# Patient Record
Sex: FEMALE | Race: BLACK OR AFRICAN AMERICAN | Employment: OTHER | URBAN - METROPOLITAN AREA
[De-identification: names, ages, dates, MRNs, and addresses within clinical notes are randomized per-mention and may not be internally consistent; named-entity substitution may affect disease eponyms.]

---

## 2017-01-26 ENCOUNTER — OP HISTORICAL/CONVERTED ENCOUNTER (OUTPATIENT)
Dept: OTHER | Age: 67
End: 2017-01-26

## 2017-01-27 ENCOUNTER — OP HISTORICAL/CONVERTED ENCOUNTER (OUTPATIENT)
Dept: OTHER | Age: 67
End: 2017-01-27

## 2017-01-30 ENCOUNTER — OP HISTORICAL/CONVERTED ENCOUNTER (OUTPATIENT)
Dept: OTHER | Age: 67
End: 2017-01-30

## 2017-02-01 ENCOUNTER — OP HISTORICAL/CONVERTED ENCOUNTER (OUTPATIENT)
Dept: OTHER | Age: 67
End: 2017-02-01

## 2017-02-03 ENCOUNTER — OP HISTORICAL/CONVERTED ENCOUNTER (OUTPATIENT)
Dept: OTHER | Age: 67
End: 2017-02-03

## 2017-02-06 ENCOUNTER — OP HISTORICAL/CONVERTED ENCOUNTER (OUTPATIENT)
Dept: OTHER | Age: 67
End: 2017-02-06

## 2017-02-08 ENCOUNTER — OP HISTORICAL/CONVERTED ENCOUNTER (OUTPATIENT)
Dept: OTHER | Age: 67
End: 2017-02-08

## 2017-02-13 ENCOUNTER — IP HISTORICAL/CONVERTED ENCOUNTER (OUTPATIENT)
Dept: OTHER | Age: 67
End: 2017-02-13

## 2017-02-13 ENCOUNTER — OP HISTORICAL/CONVERTED ENCOUNTER (OUTPATIENT)
Dept: OTHER | Age: 67
End: 2017-02-13

## 2017-02-19 ENCOUNTER — OP HISTORICAL/CONVERTED ENCOUNTER (OUTPATIENT)
Dept: OTHER | Age: 67
End: 2017-02-19

## 2017-03-16 ENCOUNTER — OP HISTORICAL/CONVERTED ENCOUNTER (OUTPATIENT)
Dept: OTHER | Age: 67
End: 2017-03-16

## 2017-03-17 ENCOUNTER — OP HISTORICAL/CONVERTED ENCOUNTER (OUTPATIENT)
Dept: OTHER | Age: 67
End: 2017-03-17

## 2017-03-18 ENCOUNTER — OP HISTORICAL/CONVERTED ENCOUNTER (OUTPATIENT)
Dept: OTHER | Age: 67
End: 2017-03-18

## 2017-03-19 ENCOUNTER — OP HISTORICAL/CONVERTED ENCOUNTER (OUTPATIENT)
Dept: OTHER | Age: 67
End: 2017-03-19

## 2017-03-20 ENCOUNTER — OP HISTORICAL/CONVERTED ENCOUNTER (OUTPATIENT)
Dept: OTHER | Age: 67
End: 2017-03-20

## 2017-03-21 ENCOUNTER — OP HISTORICAL/CONVERTED ENCOUNTER (OUTPATIENT)
Dept: OTHER | Age: 67
End: 2017-03-21

## 2017-03-22 ENCOUNTER — OP HISTORICAL/CONVERTED ENCOUNTER (OUTPATIENT)
Dept: OTHER | Age: 67
End: 2017-03-22

## 2017-03-27 ENCOUNTER — OP HISTORICAL/CONVERTED ENCOUNTER (OUTPATIENT)
Dept: OTHER | Age: 67
End: 2017-03-27

## 2017-03-30 ENCOUNTER — OP HISTORICAL/CONVERTED ENCOUNTER (OUTPATIENT)
Dept: OTHER | Age: 67
End: 2017-03-30

## 2017-04-11 ENCOUNTER — OP HISTORICAL/CONVERTED ENCOUNTER (OUTPATIENT)
Dept: OTHER | Age: 67
End: 2017-04-11

## 2017-04-13 ENCOUNTER — OP HISTORICAL/CONVERTED ENCOUNTER (OUTPATIENT)
Dept: OTHER | Age: 67
End: 2017-04-13

## 2017-04-15 ENCOUNTER — OP HISTORICAL/CONVERTED ENCOUNTER (OUTPATIENT)
Dept: OTHER | Age: 67
End: 2017-04-15

## 2017-04-17 ENCOUNTER — OP HISTORICAL/CONVERTED ENCOUNTER (OUTPATIENT)
Dept: OTHER | Age: 67
End: 2017-04-17

## 2017-04-21 ENCOUNTER — OP HISTORICAL/CONVERTED ENCOUNTER (OUTPATIENT)
Dept: OTHER | Age: 67
End: 2017-04-21

## 2017-04-25 ENCOUNTER — OP HISTORICAL/CONVERTED ENCOUNTER (OUTPATIENT)
Dept: OTHER | Age: 67
End: 2017-04-25

## 2017-04-28 ENCOUNTER — OP HISTORICAL/CONVERTED ENCOUNTER (OUTPATIENT)
Dept: OTHER | Age: 67
End: 2017-04-28

## 2017-05-12 ENCOUNTER — OP HISTORICAL/CONVERTED ENCOUNTER (OUTPATIENT)
Dept: OTHER | Age: 67
End: 2017-05-12

## 2017-05-15 ENCOUNTER — OP HISTORICAL/CONVERTED ENCOUNTER (OUTPATIENT)
Dept: OTHER | Age: 67
End: 2017-05-15

## 2017-05-16 ENCOUNTER — OP HISTORICAL/CONVERTED ENCOUNTER (OUTPATIENT)
Dept: OTHER | Age: 67
End: 2017-05-16

## 2017-05-18 ENCOUNTER — OP HISTORICAL/CONVERTED ENCOUNTER (OUTPATIENT)
Dept: OTHER | Age: 67
End: 2017-05-18

## 2017-06-01 ENCOUNTER — OP HISTORICAL/CONVERTED ENCOUNTER (OUTPATIENT)
Dept: OTHER | Age: 67
End: 2017-06-01

## 2017-06-02 ENCOUNTER — OP HISTORICAL/CONVERTED ENCOUNTER (OUTPATIENT)
Dept: OTHER | Age: 67
End: 2017-06-02

## 2017-06-06 ENCOUNTER — OP HISTORICAL/CONVERTED ENCOUNTER (OUTPATIENT)
Dept: OTHER | Age: 67
End: 2017-06-06

## 2017-06-07 ENCOUNTER — OP HISTORICAL/CONVERTED ENCOUNTER (OUTPATIENT)
Dept: OTHER | Age: 67
End: 2017-06-07

## 2017-06-13 ENCOUNTER — OP HISTORICAL/CONVERTED ENCOUNTER (OUTPATIENT)
Dept: OTHER | Age: 67
End: 2017-06-13

## 2017-06-15 ENCOUNTER — OP HISTORICAL/CONVERTED ENCOUNTER (OUTPATIENT)
Dept: OTHER | Age: 67
End: 2017-06-15

## 2017-06-16 ENCOUNTER — OP HISTORICAL/CONVERTED ENCOUNTER (OUTPATIENT)
Dept: OTHER | Age: 67
End: 2017-06-16

## 2017-06-19 ENCOUNTER — OP HISTORICAL/CONVERTED ENCOUNTER (OUTPATIENT)
Dept: OTHER | Age: 67
End: 2017-06-19

## 2017-06-21 ENCOUNTER — OP HISTORICAL/CONVERTED ENCOUNTER (OUTPATIENT)
Dept: OTHER | Age: 67
End: 2017-06-21

## 2017-06-28 ENCOUNTER — OP HISTORICAL/CONVERTED ENCOUNTER (OUTPATIENT)
Dept: OTHER | Age: 67
End: 2017-06-28

## 2017-07-12 ENCOUNTER — OP HISTORICAL/CONVERTED ENCOUNTER (OUTPATIENT)
Dept: OTHER | Age: 67
End: 2017-07-12

## 2017-07-14 ENCOUNTER — OP HISTORICAL/CONVERTED ENCOUNTER (OUTPATIENT)
Dept: OTHER | Age: 67
End: 2017-07-14

## 2017-07-17 ENCOUNTER — OP HISTORICAL/CONVERTED ENCOUNTER (OUTPATIENT)
Dept: OTHER | Age: 67
End: 2017-07-17

## 2017-08-03 ENCOUNTER — IP HISTORICAL/CONVERTED ENCOUNTER (OUTPATIENT)
Dept: OTHER | Age: 67
End: 2017-08-03

## 2017-08-03 ENCOUNTER — OP HISTORICAL/CONVERTED ENCOUNTER (OUTPATIENT)
Dept: OTHER | Age: 67
End: 2017-08-03

## 2017-08-15 ENCOUNTER — IP HISTORICAL/CONVERTED ENCOUNTER (OUTPATIENT)
Dept: OTHER | Age: 67
End: 2017-08-15

## 2017-08-23 ENCOUNTER — OP HISTORICAL/CONVERTED ENCOUNTER (OUTPATIENT)
Dept: OTHER | Age: 67
End: 2017-08-23

## 2017-09-08 ENCOUNTER — OP HISTORICAL/CONVERTED ENCOUNTER (OUTPATIENT)
Dept: OTHER | Age: 67
End: 2017-09-08

## 2017-09-15 ENCOUNTER — OP HISTORICAL/CONVERTED ENCOUNTER (OUTPATIENT)
Dept: OTHER | Age: 67
End: 2017-09-15

## 2017-09-22 ENCOUNTER — OP HISTORICAL/CONVERTED ENCOUNTER (OUTPATIENT)
Dept: OTHER | Age: 67
End: 2017-09-22

## 2017-09-25 ENCOUNTER — OP HISTORICAL/CONVERTED ENCOUNTER (OUTPATIENT)
Dept: OTHER | Age: 67
End: 2017-09-25

## 2017-10-08 ENCOUNTER — IP HISTORICAL/CONVERTED ENCOUNTER (OUTPATIENT)
Dept: OTHER | Age: 67
End: 2017-10-08

## 2017-10-18 ENCOUNTER — OP HISTORICAL/CONVERTED ENCOUNTER (OUTPATIENT)
Dept: OTHER | Age: 67
End: 2017-10-18

## 2017-10-30 ENCOUNTER — OP HISTORICAL/CONVERTED ENCOUNTER (OUTPATIENT)
Dept: OTHER | Age: 67
End: 2017-10-30

## 2017-11-30 ENCOUNTER — OP HISTORICAL/CONVERTED ENCOUNTER (OUTPATIENT)
Dept: OTHER | Age: 67
End: 2017-11-30

## 2017-12-01 ENCOUNTER — OP HISTORICAL/CONVERTED ENCOUNTER (OUTPATIENT)
Dept: OTHER | Age: 67
End: 2017-12-01

## 2017-12-04 ENCOUNTER — OP HISTORICAL/CONVERTED ENCOUNTER (OUTPATIENT)
Dept: OTHER | Age: 67
End: 2017-12-04

## 2017-12-06 ENCOUNTER — OP HISTORICAL/CONVERTED ENCOUNTER (OUTPATIENT)
Dept: OTHER | Age: 67
End: 2017-12-06

## 2017-12-08 ENCOUNTER — OP HISTORICAL/CONVERTED ENCOUNTER (OUTPATIENT)
Dept: OTHER | Age: 67
End: 2017-12-08

## 2017-12-12 ENCOUNTER — OP HISTORICAL/CONVERTED ENCOUNTER (OUTPATIENT)
Dept: OTHER | Age: 67
End: 2017-12-12

## 2017-12-22 ENCOUNTER — IP HISTORICAL/CONVERTED ENCOUNTER (OUTPATIENT)
Dept: OTHER | Age: 67
End: 2017-12-22

## 2017-12-29 ENCOUNTER — OP HISTORICAL/CONVERTED ENCOUNTER (OUTPATIENT)
Dept: OTHER | Age: 67
End: 2017-12-29

## 2017-12-31 ENCOUNTER — OP HISTORICAL/CONVERTED ENCOUNTER (OUTPATIENT)
Dept: OTHER | Age: 67
End: 2017-12-31

## 2018-01-03 ENCOUNTER — OP HISTORICAL/CONVERTED ENCOUNTER (OUTPATIENT)
Dept: OTHER | Age: 68
End: 2018-01-03

## 2018-01-04 ENCOUNTER — OP HISTORICAL/CONVERTED ENCOUNTER (OUTPATIENT)
Dept: OTHER | Age: 68
End: 2018-01-04

## 2018-01-05 ENCOUNTER — OP HISTORICAL/CONVERTED ENCOUNTER (OUTPATIENT)
Dept: OTHER | Age: 68
End: 2018-01-05

## 2018-01-07 ENCOUNTER — OP HISTORICAL/CONVERTED ENCOUNTER (OUTPATIENT)
Dept: OTHER | Age: 68
End: 2018-01-07

## 2018-01-09 ENCOUNTER — OP HISTORICAL/CONVERTED ENCOUNTER (OUTPATIENT)
Dept: OTHER | Age: 68
End: 2018-01-09

## 2018-01-10 ENCOUNTER — OP HISTORICAL/CONVERTED ENCOUNTER (OUTPATIENT)
Dept: OTHER | Age: 68
End: 2018-01-10

## 2018-01-12 ENCOUNTER — OP HISTORICAL/CONVERTED ENCOUNTER (OUTPATIENT)
Dept: OTHER | Age: 68
End: 2018-01-12

## 2018-01-13 ENCOUNTER — OP HISTORICAL/CONVERTED ENCOUNTER (OUTPATIENT)
Dept: OTHER | Age: 68
End: 2018-01-13

## 2018-01-23 ENCOUNTER — ED HISTORICAL/CONVERTED ENCOUNTER (OUTPATIENT)
Dept: OTHER | Age: 68
End: 2018-01-23

## 2018-05-16 ENCOUNTER — OP HISTORICAL/CONVERTED ENCOUNTER (OUTPATIENT)
Dept: OTHER | Age: 68
End: 2018-05-16

## 2018-06-27 ENCOUNTER — ED HISTORICAL/CONVERTED ENCOUNTER (OUTPATIENT)
Dept: OTHER | Age: 68
End: 2018-06-27

## 2019-02-15 ENCOUNTER — ED HISTORICAL/CONVERTED ENCOUNTER (OUTPATIENT)
Dept: OTHER | Age: 69
End: 2019-02-15

## 2019-03-27 ENCOUNTER — OP HISTORICAL/CONVERTED ENCOUNTER (OUTPATIENT)
Dept: OTHER | Age: 69
End: 2019-03-27

## 2019-04-08 ENCOUNTER — ED HISTORICAL/CONVERTED ENCOUNTER (OUTPATIENT)
Dept: OTHER | Age: 69
End: 2019-04-08

## 2019-04-15 ENCOUNTER — IP HISTORICAL/CONVERTED ENCOUNTER (OUTPATIENT)
Dept: OTHER | Age: 69
End: 2019-04-15

## 2019-06-21 ENCOUNTER — OP HISTORICAL/CONVERTED ENCOUNTER (OUTPATIENT)
Dept: OTHER | Age: 69
End: 2019-06-21

## 2019-07-25 ENCOUNTER — OP HISTORICAL/CONVERTED ENCOUNTER (OUTPATIENT)
Dept: OTHER | Age: 69
End: 2019-07-25

## 2020-04-24 ENCOUNTER — IP HISTORICAL/CONVERTED ENCOUNTER (OUTPATIENT)
Dept: OTHER | Age: 70
End: 2020-04-24

## 2020-10-17 ENCOUNTER — HOSPITAL ENCOUNTER (INPATIENT)
Age: 70
LOS: 11 days | Discharge: NURSING, REHAB OR LONG-TERM CARE | DRG: 640 | End: 2020-10-28
Attending: EMERGENCY MEDICINE | Admitting: INTERNAL MEDICINE
Payer: MEDICARE

## 2020-10-17 DIAGNOSIS — E87.0 HYPERNATREMIA: Primary | ICD-10-CM

## 2020-10-17 DIAGNOSIS — N39.0 URINARY TRACT INFECTION WITHOUT HEMATURIA, SITE UNSPECIFIED: ICD-10-CM

## 2020-10-17 LAB
ALBUMIN SERPL-MCNC: 2.9 G/DL (ref 3.5–5)
ALBUMIN/GLOB SERPL: 0.7 {RATIO} (ref 1.1–2.2)
ALP SERPL-CCNC: 140 U/L (ref 45–117)
ALT SERPL-CCNC: 16 U/L (ref 12–78)
ANION GAP SERPL CALC-SCNC: 1 MMOL/L (ref 5–15)
ANION GAP SERPL CALC-SCNC: 3 MMOL/L (ref 5–15)
APPEARANCE UR: CLEAR
AST SERPL W P-5'-P-CCNC: ABNORMAL U/L (ref 15–37)
BACTERIA URNS QL MICRO: NEGATIVE /HPF
BASOPHILS # BLD: 0 K/UL (ref 0–0.1)
BASOPHILS NFR BLD: 0 % (ref 0–1)
BILIRUB SERPL-MCNC: 0.4 MG/DL (ref 0.2–1)
BILIRUB UR QL: NEGATIVE
BUN SERPL-MCNC: 26 MG/DL (ref 6–20)
BUN SERPL-MCNC: 38 MG/DL (ref 6–20)
BUN/CREAT SERPL: 17 (ref 12–20)
BUN/CREAT SERPL: 20 (ref 12–20)
CA-I BLD-MCNC: 8.2 MG/DL (ref 8.5–10.1)
CA-I BLD-MCNC: 8.3 MG/DL (ref 8.5–10.1)
CHLORIDE SERPL-SCNC: 128 MMOL/L (ref 97–108)
CHLORIDE SERPL-SCNC: 136 MMOL/L (ref 97–108)
CO2 SERPL-SCNC: 26 MMOL/L (ref 21–32)
CO2 SERPL-SCNC: 29 MMOL/L (ref 21–32)
COLOR UR: ABNORMAL
CREAT SERPL-MCNC: 1.52 MG/DL (ref 0.55–1.02)
CREAT SERPL-MCNC: 1.89 MG/DL (ref 0.55–1.02)
DIFFERENTIAL METHOD BLD: ABNORMAL
EOSINOPHIL # BLD: 0.2 K/UL (ref 0–0.4)
EOSINOPHIL NFR BLD: 3 % (ref 0–7)
ERYTHROCYTE [DISTWIDTH] IN BLOOD BY AUTOMATED COUNT: 25.6 % (ref 11.5–14.5)
GLOBULIN SER CALC-MCNC: 4.3 G/DL (ref 2–4)
GLUCOSE SERPL-MCNC: 112 MG/DL (ref 65–100)
GLUCOSE SERPL-MCNC: 70 MG/DL (ref 65–100)
GLUCOSE UR STRIP.AUTO-MCNC: NEGATIVE MG/DL
HCT VFR BLD AUTO: 47.6 % (ref 35–47)
HGB BLD-MCNC: 13.8 G/DL (ref 11.5–16)
HGB UR QL STRIP: ABNORMAL
HYALINE CASTS URNS QL MICRO: ABNORMAL /LPF (ref 0–5)
IMM GRANULOCYTES # BLD AUTO: 0 K/UL (ref 0–0.04)
IMM GRANULOCYTES NFR BLD AUTO: 0 % (ref 0–0.5)
KETONES UR QL STRIP.AUTO: NEGATIVE MG/DL
LEUKOCYTE ESTERASE UR QL STRIP.AUTO: NEGATIVE
LYMPHOCYTES # BLD: 2.7 K/UL (ref 0.8–3.5)
LYMPHOCYTES NFR BLD: 47 % (ref 12–49)
MCH RBC QN AUTO: 27.9 PG (ref 26–34)
MCHC RBC AUTO-ENTMCNC: 29 G/DL (ref 30–36.5)
MCV RBC AUTO: 96.2 FL (ref 80–99)
MONOCYTES # BLD: 0.4 K/UL (ref 0–1)
MONOCYTES NFR BLD: 8 % (ref 5–13)
MUCOUS THREADS URNS QL MICRO: ABNORMAL /LPF
NEUTS SEG # BLD: 2.4 K/UL (ref 1.8–8)
NEUTS SEG NFR BLD: 42 % (ref 32–75)
NITRITE UR QL STRIP.AUTO: NEGATIVE
NRBC # BLD: 0.02 K/UL (ref 0–0.01)
NRBC BLD-RTO: 0.3 PER 100 WBC
PH UR STRIP: 5 [PH] (ref 5–8)
PLATELET # BLD AUTO: 132 K/UL (ref 150–400)
POTASSIUM SERPL-SCNC: 4.1 MMOL/L (ref 3.5–5.1)
POTASSIUM SERPL-SCNC: ABNORMAL MMOL/L (ref 3.5–5.1)
PROT SERPL-MCNC: 7.2 G/DL (ref 6.4–8.2)
PROT UR STRIP-MCNC: NEGATIVE MG/DL
RBC # BLD AUTO: 4.95 M/UL (ref 3.8–5.2)
RBC #/AREA URNS HPF: ABNORMAL /HPF (ref 0–5)
SODIUM SERPL-SCNC: 160 MMOL/L (ref 136–145)
SODIUM SERPL-SCNC: 163 MMOL/L (ref 136–145)
SP GR UR REFRACTOMETRY: 1.03 (ref 1–1.03)
UA: UC IF INDICATED,UAUC: ABNORMAL
UROBILINOGEN UR QL STRIP.AUTO: 0.1 EU/DL (ref 0.1–1)
WBC # BLD AUTO: 5.7 K/UL (ref 3.6–11)
WBC URNS QL MICRO: ABNORMAL /HPF (ref 0–4)

## 2020-10-17 PROCEDURE — 65270000029 HC RM PRIVATE

## 2020-10-17 PROCEDURE — 80053 COMPREHEN METABOLIC PANEL: CPT

## 2020-10-17 PROCEDURE — 87635 SARS-COV-2 COVID-19 AMP PRB: CPT

## 2020-10-17 PROCEDURE — 74011000258 HC RX REV CODE- 258: Performed by: INTERNAL MEDICINE

## 2020-10-17 PROCEDURE — 87086 URINE CULTURE/COLONY COUNT: CPT

## 2020-10-17 PROCEDURE — 36415 COLL VENOUS BLD VENIPUNCTURE: CPT

## 2020-10-17 PROCEDURE — 74011250636 HC RX REV CODE- 250/636: Performed by: INTERNAL MEDICINE

## 2020-10-17 PROCEDURE — 74011250637 HC RX REV CODE- 250/637: Performed by: INTERNAL MEDICINE

## 2020-10-17 PROCEDURE — 80048 BASIC METABOLIC PNL TOTAL CA: CPT

## 2020-10-17 PROCEDURE — 81001 URINALYSIS AUTO W/SCOPE: CPT

## 2020-10-17 PROCEDURE — 85025 COMPLETE CBC W/AUTO DIFF WBC: CPT

## 2020-10-17 PROCEDURE — 99284 EMERGENCY DEPT VISIT MOD MDM: CPT

## 2020-10-17 PROCEDURE — 74011000258 HC RX REV CODE- 258: Performed by: EMERGENCY MEDICINE

## 2020-10-17 RX ORDER — DOCUSATE SODIUM 100 MG/1
100 CAPSULE, LIQUID FILLED ORAL 2 TIMES DAILY
Status: DISCONTINUED | OUTPATIENT
Start: 2020-10-17 | End: 2020-10-28 | Stop reason: HOSPADM

## 2020-10-17 RX ORDER — ENOXAPARIN SODIUM 100 MG/ML
30 INJECTION SUBCUTANEOUS EVERY 24 HOURS
Status: DISCONTINUED | OUTPATIENT
Start: 2020-10-17 | End: 2020-10-23

## 2020-10-17 RX ORDER — SODIUM CHLORIDE 0.9 % (FLUSH) 0.9 %
5-40 SYRINGE (ML) INJECTION EVERY 8 HOURS
Status: DISCONTINUED | OUTPATIENT
Start: 2020-10-17 | End: 2020-10-27

## 2020-10-17 RX ORDER — SODIUM CHLORIDE 450 MG/100ML
150 INJECTION, SOLUTION INTRAVENOUS CONTINUOUS
Status: DISCONTINUED | OUTPATIENT
Start: 2020-10-17 | End: 2020-10-18

## 2020-10-17 RX ORDER — ACETAMINOPHEN 325 MG/1
650 TABLET ORAL
Status: DISCONTINUED | OUTPATIENT
Start: 2020-10-17 | End: 2020-10-28 | Stop reason: HOSPADM

## 2020-10-17 RX ORDER — SODIUM CHLORIDE 0.9 % (FLUSH) 0.9 %
5-40 SYRINGE (ML) INJECTION AS NEEDED
Status: DISCONTINUED | OUTPATIENT
Start: 2020-10-17 | End: 2020-10-28 | Stop reason: HOSPADM

## 2020-10-17 RX ADMIN — SODIUM CHLORIDE 150 ML/HR: 4.5 INJECTION, SOLUTION INTRAVENOUS at 01:47

## 2020-10-17 RX ADMIN — SODIUM CHLORIDE 150 ML/HR: 4.5 INJECTION, SOLUTION INTRAVENOUS at 15:51

## 2020-10-17 RX ADMIN — Medication 10 ML: at 15:52

## 2020-10-17 RX ADMIN — CEFTRIAXONE SODIUM 1 G: 1 INJECTION, POWDER, FOR SOLUTION INTRAMUSCULAR; INTRAVENOUS at 12:21

## 2020-10-17 RX ADMIN — SODIUM CHLORIDE 150 ML/HR: 4.5 INJECTION, SOLUTION INTRAVENOUS at 08:50

## 2020-10-17 RX ADMIN — DOCUSATE SODIUM 100 MG: 100 CAPSULE, LIQUID FILLED ORAL at 22:45

## 2020-10-17 RX ADMIN — Medication 10 ML: at 22:46

## 2020-10-17 NOTE — H&P
[address]   History and Physical    Primary Care Provider: Paolo Reed MD  Date of Service:  10/17/2020    Subjective:     Joanna Molina is a 69 y.o. female who presents with altered mental status. Onset of symptoms was gradual with gradually worsening course since that time. Patient's sodium level was elevated at 173. UA shows possible UTI  Review of Systems:    Unable to obtain because of patient's altered mental status    No past medical history on file. No past surgical history on file. Prior to Admission medications    Not on File     No Known Allergies   No family history on file. SOCIAL HISTORY:  Patient resides at [facility]. Patient ambulates with assisted device. Smoking history: never  Alcohol history: None        Objective:       Physical Exam:   Visit Vitals  BP (!) 149/95 (BP 1 Location: Right arm, BP Patient Position: At rest)   Pulse 68   Temp 98 °F (36.7 °C)   Resp 19   SpO2 96%     General:   Mild confusion   Head:  Normocephalic, without obvious abnormality, atraumatic. Eyes:  Conjunctivae/corneas clear. PERRL, EOMs intact. Fundi benign. Ears:  Normal TMs and external ear canals both ears. Nose: Nares normal. Septum midline. Mucosa normal. No drainage or sinus tenderness. Throat: Lips, mucosa, and tongue normal. Teeth and gums normal.   Neck: Supple, symmetrical, trachea midline, no adenopathy, thyroid: no enlargement/tenderness/nodules, no carotid bruit and no JVD. Back:   Symmetric, no curvature. ROM normal. No CVA tenderness. Lungs:   Clear to auscultation bilaterally. Chest wall:  No tenderness or deformity. Heart:  Regular rate and rhythm, S1, S2 normal, no murmur, click, rub or gallop. Breast Exam:  No tenderness, masses, or nipple abnormality. Abdomen:   Soft, non-tender. Bowel sounds normal. No masses,  No organomegaly. Genitalia:  Normal female without lesion, discharge or tenderness.    Rectal:  Normal tone,  no masses or tenderness  Guaiac negative stool. Extremities: Extremities normal, atraumatic, no cyanosis or edema. Pulses: 2+ and symmetric all extremities. Skin: Skin color, texture, turgor normal. No rashes or lesions. Lymph nodes: Cervical, supraclavicular, and axillary nodes normal.   Neurologic: CNII-XII intact. Normal strength, sensation and reflexes throughout. Cap refill: Brisk, less than 3 seconds  Pulses: 2+, symmetric in all extremities    ECG:  normal EKG, normal sinus rhythm, unchanged from previous tracings     Data Review: All diagnostic labs and studies have been reviewed. Chest x-ray  No orders to display       Assessment:     Active Problems:    Hypernatremia (10/17/2020)      UTI (urinary tract infection) (10/17/2020)    Acute severe hypernatremia continue with hypotonic solutions consult nephrology  Acute metabolic encephalopathy secondary to electrolyte abnormality monitor BMP and mental status  Volume depletion  UTI placed on IV antibiotics  Patient Active Problem List   Diagnosis Code    Hypernatremia E87.0    UTI (urinary tract infection) N39.0       Plan:     1.   Continue with hypotonic IV fluids  FUNCTIONAL STATUS PRIOR TO HOSPITALIZATION Ambulatory with Use of Assistive Devices (including history of recent falls)    Signed By: Marlin Cárdenas MD     October 17, 2020

## 2020-10-17 NOTE — ED NOTES
Pt resting in bed in no distress, pt continues to call out and state that she needed to urinate    Pt placed on bedpan, however is not able to urinate    Purewick placed on pt without difficulty, pt tolerating well

## 2020-10-17 NOTE — ED TRIAGE NOTES
Pt arrived to ED via EMS from [facility] with CO altered mental status and irregular labs per nursing home.

## 2020-10-17 NOTE — ED PROVIDER NOTES
EMERGENCY DEPARTMENT HISTORY AND PHYSICAL EXAM      Date: 10/17/2020  Patient Name: Genesis Santos      History of Presenting Illness     Chief Complaint   Patient presents with    Altered mental status       History Provided By: EMS and Patient chart    HPI: Genesis Santos, 69 y.o. female with a past medical history significant  Hypertension, feeding difficulties, dementia, stroke, seizure disorder presents to the ED from nursing facility where she was noted to have urinary tract infection as well as hypernatremia. Patient is pleasantly confused. She has no complaints currently. Sodium at facility was 174. Creatinine was 1.9. Baseline showing signs of severe dehydration. Likely because she has not been eating. She had a negative Covid test 2 days ago. She had a Keppra level of 75 2 days ago. There are no other complaints, changes, or physical findings at this time. PCP: Bryce Merida MD    Current Facility-Administered Medications   Medication Dose Route Frequency Provider Last Rate Last Dose    0.45% sodium chloride infusion  150 mL/hr IntraVENous CONTINUOUS Hair Heath MD        acetaminophen (TYLENOL) tablet 650 mg  650 mg Oral Q6H PRN Hair Heath MD           Past History     Past Medical History:  No past medical history on file. Past Surgical History:  No past surgical history on file. Family History:  No family history on file. Social History:  Social History     Tobacco Use    Smoking status: Not on file   Substance Use Topics    Alcohol use: Not on file    Drug use: Not on file       Allergies:  No Known Allergies      Review of Systems      Review of Systems   Unable to perform ROS: Dementia       Physical Exam      Physical Exam  Vitals signs and nursing note reviewed. Constitutional:       Appearance: She is well-developed. HENT:      Head: Normocephalic and atraumatic.       Mouth/Throat:      Mouth: Mucous membranes are moist.   Eyes: Extraocular Movements: Extraocular movements intact. Pupils: Pupils are equal, round, and reactive to light. Neck:      Musculoskeletal: Normal range of motion. Cardiovascular:      Rate and Rhythm: Normal rate and regular rhythm. Heart sounds: Normal heart sounds. No murmur. Pulmonary:      Effort: Pulmonary effort is normal.      Breath sounds: Normal breath sounds. Abdominal:      General: Bowel sounds are increased. Palpations: Abdomen is soft. Tenderness: There is abdominal tenderness in the epigastric area. There is no guarding or rebound. Hernia: No hernia is present. Musculoskeletal: Normal range of motion. Skin:     General: Skin is warm and dry. Capillary Refill: Capillary refill takes less than 2 seconds. Neurological:      Mental Status: She is alert. Cranial Nerves: No dysarthria. Motor: No weakness. Deep Tendon Reflexes: Reflexes normal.   Psychiatric:         Mood and Affect: Mood normal.         Behavior: Behavior normal.         Diagnostic Study Results     Labs -     Recent Results (from the past 12 hour(s))   CBC WITH AUTOMATED DIFF    Collection Time: 10/17/20 12:30 AM   Result Value Ref Range    WBC 5.7 3.6 - 11.0 K/uL    RBC 4.95 3.80 - 5.20 M/uL    HGB 13.8 11.5 - 16.0 g/dL    HCT 47.6 (H) 35.0 - 47.0 %    MCV 96.2 80.0 - 99.0 FL    MCH 27.9 26.0 - 34.0 PG    MCHC 29.0 (L) 30.0 - 36.5 g/dL    RDW 25.6 (H) 11.5 - 14.5 %    PLATELET 570 (L) 107 - 400 K/uL    NRBC 0.3 (H) 0  WBC    ABSOLUTE NRBC 0.02 (H) 0.00 - 0.01 K/uL    NEUTROPHILS 42 32 - 75 %    LYMPHOCYTES 47 12 - 49 %    MONOCYTES 8 5 - 13 %    EOSINOPHILS 3 0 - 7 %    BASOPHILS 0 0 - 1 %    IMMATURE GRANULOCYTES 0 0.0 - 0.5 %    ABS. NEUTROPHILS 2.4 1.8 - 8.0 K/UL    ABS. LYMPHOCYTES 2.7 0.8 - 3.5 K/UL    ABS. MONOCYTES 0.4 0.0 - 1.0 K/UL    ABS. EOSINOPHILS 0.2 0.0 - 0.4 K/UL    ABS. BASOPHILS 0.0 0.0 - 0.1 K/UL    ABS. IMM.  GRANS. 0.0 0.00 - 0.04 K/UL    DF AUTOMATED METABOLIC PANEL, COMPREHENSIVE    Collection Time: 10/17/20 12:30 AM   Result Value Ref Range    Sodium 163 (HH) 136 - 145 mmol/L    Potassium Hemolyzed, recollect requested 3.5 - 5.1 mmol/L    Chloride 136 (H) 97 - 108 mmol/L    CO2 26 21 - 32 mmol/L    Anion gap 1 (L) 5 - 15 mmol/L    Glucose 112 (H) 65 - 100 mg/dL    BUN 38 (H) 6 - 20 mg/dL    Creatinine 1.89 (H) 0.55 - 1.02 mg/dL    BUN/Creatinine ratio 20 12 - 20      GFR est AA 32 (L) >60 ml/min/1.73m2    GFR est non-AA 26 (L) >60 ml/min/1.73m2    Calcium 8.3 (L) 8.5 - 10.1 mg/dL    Bilirubin, total 0.4 0.2 - 1.0 mg/dL    AST (SGOT) Hemolyzed, recollect requested 15 - 37 U/L    ALT (SGPT) 16 12 - 78 U/L    Alk. phosphatase 140 (H) 45 - 117 U/L    Protein, total 7.2 6.4 - 8.2 g/dL    Albumin 2.9 (L) 3.5 - 5.0 g/dL    Globulin 4.3 (H) 2.0 - 4.0 g/dL    A-G Ratio 0.7 (L) 1.1 - 2.2         Radiologic Studies -   [unfilled]  CT Results  (Last 48 hours)    None        CXR Results  (Last 48 hours)    None          Medical Decision Making and ED Course     Vital Signs-Reviewed the patient's vital signs. Patient Vitals for the past 12 hrs:   Temp Pulse Resp BP SpO2   10/17/20 0006 97.8 °F (36.6 °C) 69 16 (!) 140/76 96 %           Provider Notes (Medical Decision Making):   74-year-old female with history of dementia presenting to emergency department with elevated sodium level and report of UTI from the outside facility. We have results of her chemistry which was sodium of 174. Sodium here is 169. Likely volume contraction secondary to not eating or drinking. Will check UA. [facility]       ED Course:     - I am the first and primary provider for this patient. - I reviewed the vital signs, available nursing notes, past medical history, past surgical history, family history and social history. Initial assessment performed. The patients presenting problems have been discussed, and they are in agreement with the care plan formulated and outlined with them.   I have encouraged them to ask questions as they arise throughout their visit. CONSULTATIONS:      Dr. Greg Templeton, medicine to see for admission       Disposition        Admitted     Diagnosis     Clinical Impression:   1. Hypernatremia    2. Urinary tract infection without hematuria, site unspecified        Attestations:    Justin Joy MD    Please note that this dictation was completed with dough, the computer voice recognition software. Quite often unanticipated grammatical, syntax, homophones, and other interpretive errors are inadvertently transcribed by the computer software. Please disregard these errors. Please excuse any errors that have escaped final proofreading. Thank you.

## 2020-10-18 LAB
ANION GAP SERPL CALC-SCNC: 7 MMOL/L (ref 5–15)
BUN SERPL-MCNC: 22 MG/DL (ref 6–20)
BUN/CREAT SERPL: 18 (ref 12–20)
CA-I BLD-MCNC: 8.3 MG/DL (ref 8.5–10.1)
CHLORIDE SERPL-SCNC: 124 MMOL/L (ref 97–108)
CO2 SERPL-SCNC: 20 MMOL/L (ref 21–32)
CREAT SERPL-MCNC: 1.21 MG/DL (ref 0.55–1.02)
GLUCOSE SERPL-MCNC: 61 MG/DL (ref 65–100)
MRSA DNA SPEC QL NAA+PROBE: DETECTED
POTASSIUM SERPL-SCNC: 4.8 MMOL/L (ref 3.5–5.1)
SARS-COV-2, COV2: NORMAL
SODIUM SERPL-SCNC: 151 MMOL/L (ref 136–145)

## 2020-10-18 PROCEDURE — 36415 COLL VENOUS BLD VENIPUNCTURE: CPT

## 2020-10-18 PROCEDURE — 74011000258 HC RX REV CODE- 258: Performed by: INTERNAL MEDICINE

## 2020-10-18 PROCEDURE — 65270000029 HC RM PRIVATE

## 2020-10-18 PROCEDURE — 74011250637 HC RX REV CODE- 250/637: Performed by: INTERNAL MEDICINE

## 2020-10-18 PROCEDURE — 87641 MR-STAPH DNA AMP PROBE: CPT

## 2020-10-18 PROCEDURE — 92610 EVALUATE SWALLOWING FUNCTION: CPT

## 2020-10-18 PROCEDURE — 94762 N-INVAS EAR/PLS OXIMTRY CONT: CPT

## 2020-10-18 PROCEDURE — 80048 BASIC METABOLIC PNL TOTAL CA: CPT

## 2020-10-18 PROCEDURE — 74011250636 HC RX REV CODE- 250/636: Performed by: INTERNAL MEDICINE

## 2020-10-18 PROCEDURE — 74011000258 HC RX REV CODE- 258: Performed by: EMERGENCY MEDICINE

## 2020-10-18 RX ORDER — DESMOPRESSIN ACETATE 0.2 MG/1
200 TABLET ORAL
Status: DISCONTINUED | OUTPATIENT
Start: 2020-10-19 | End: 2020-10-19

## 2020-10-18 RX ORDER — DEXTROSE MONOHYDRATE 50 MG/ML
125 INJECTION, SOLUTION INTRAVENOUS CONTINUOUS
Status: DISCONTINUED | OUTPATIENT
Start: 2020-10-18 | End: 2020-10-19

## 2020-10-18 RX ORDER — MUPIROCIN 20 MG/G
OINTMENT TOPICAL 2 TIMES DAILY
Status: DISCONTINUED | OUTPATIENT
Start: 2020-10-18 | End: 2020-10-28 | Stop reason: HOSPADM

## 2020-10-18 RX ADMIN — CEFTRIAXONE SODIUM 1 G: 1 INJECTION, POWDER, FOR SOLUTION INTRAMUSCULAR; INTRAVENOUS at 12:59

## 2020-10-18 RX ADMIN — ENOXAPARIN SODIUM 30 MG: 40 INJECTION SUBCUTANEOUS at 12:58

## 2020-10-18 RX ADMIN — Medication 10 ML: at 21:39

## 2020-10-18 RX ADMIN — Medication 10 ML: at 05:39

## 2020-10-18 RX ADMIN — DEXTROSE MONOHYDRATE 125 ML/HR: 50 INJECTION, SOLUTION INTRAVENOUS at 16:03

## 2020-10-18 RX ADMIN — MUPIROCIN: 20 OINTMENT TOPICAL at 12:58

## 2020-10-18 RX ADMIN — MUPIROCIN: 20 OINTMENT TOPICAL at 21:38

## 2020-10-18 RX ADMIN — SODIUM CHLORIDE 150 ML/HR: 4.5 INJECTION, SOLUTION INTRAVENOUS at 13:00

## 2020-10-18 NOTE — PROGRESS NOTES
Phone order received from attending Physician for speech evaluation and diet order on file from nursing home if patient is able to swallow.

## 2020-10-18 NOTE — PROGRESS NOTES
SPEECH LANGUAGE PATHOLOGY BEDSIDE SWALLOW EVALUATIONS  Patient: Carlos Lazar (33 y.o. female)  Date: 10/18/2020  Primary Diagnosis: Hypernatremia [E87.0]  UTI (urinary tract infection) [N39.0]  Hypernatremia [E87.0]          ASSESSMENT :  Patient positioned upright, alert, and cooperative. Evaluating SLP noted PEG tube placement, yet patient stated she eats at home. SLP contacted [address] [place] [address] and the nurse acknowledged that patient only receives medication and water flushes on PEG tube and eats a regular cardiac diet with thin liquids. Patient was given cookies and ice water via straw. Prior to eating SLP assisted with removing dry skin from patient's lips at her request. Afterwards, patient tolerated PO intake without difficulty. Good mastication and manipulation of bolus. Timely swallows. Good HLE via digital palpation. Patient also maintained dry vocal quality throughout evaluation. Patient presents with functional swallow. Recommending regular diet with thin liquids, continuing all other dietary recommendations. Nursing and MD aware of recommendations, evaluation results and conversation with nurse at [address] [place] [address]. No further skilled ST services are warranted at this time. PLAN :  Recommendations and Planned Interventions:  Evaluation Only. SUBJECTIVE:   Patient I eat regular food at home. OBJECTIVE:     CXR Results  (Last 48 hours)      None           CT Results  (Last 48 hours)      None             No past medical history on file. No past surgical history on file.   Prior Level of Function/Home Situation: Not sure  Home Situation  Home Environment: Skilled nursing facility  One/Two Story Residence: Other (Comment)  Living Alone: No  Support Systems: Skilled nursing facility  Patient Expects to be Discharged to[de-identified] Skilled nursing facility  Current DME Used/Available at Home: Other (comment)  Diet prior to admission: Regular  Current Diet:  Regular   Cognitive and Communication Status:  Neurologic State: Alert, Confused  Orientation Level: Oriented to person       Pain:  Pain Scale 1: Numeric (0 - 10)  Pain Intensity 1: 0       After treatment:   Patient left in no apparent distress in bed and Call bell within reach    COMMUNICATION/EDUCATION:   Patient was educated regarding purpose of SLP assessment, diet recs and sw safety precautions. Patient demonstrated Good understanding as evidenced by verbal response. Swallow is within functional limits. No further skilled ST services are warranted at this time.        Thank you for this referral.  Tena King M.S. CCC-SLP

## 2020-10-18 NOTE — ROUTINE PROCESS
Bedside shift change report given to Massiel Ruiz RN  (oncoming nurse) by Michelle Bartlett (offgoing nurse). Report included the following information SBAR and MAR.

## 2020-10-18 NOTE — ROUTINE PROCESS
TRANSFER - OUT REPORT: 
 
Verbal report given to [facility] RN(name) on Emily Rees  being transferred to  [phone] 4E(unit) for routine progression of care Report consisted of patients Situation, Background, Assessment and  
Recommendations(SBAR). Information from the following report(s) SBAR, ED Summary, Intake/Output, Recent Results and Cardiac Rhythm NSR was reviewed with the receiving nurse. Lines:  
Peripheral IV 10/17/20 Right Hand (Active) Peripheral IV 10/17/20 Left Antecubital (Active) Site Assessment Clean, dry, & intact 10/17/20 1620 Phlebitis Assessment 0 10/17/20 1620 Infiltration Assessment 0 10/17/20 1620 Dressing Status Clean, dry, & intact 10/17/20 1620 Dressing Type Tape;Transparent 10/17/20 1620 Hub Color/Line Status Pink 10/17/20 1620 Opportunity for questions and clarification was provided. Patient transported with: 
 [facility] Tele IV infusion

## 2020-10-18 NOTE — PROGRESS NOTES
Hospitalist Progress Note            Date of Service:  10/18/2020  NAME:  Kamala Rodriguez  :  1950  MRN:  [id number]      Admission Summary:   69years old man severe hypernatremia, volume depletion altered mental status    Interval history / Subjective:   Sodium is improving but patient's blood glucose is borderline low     Assessment & Plan:     Acute severe hypernatremia  Depletion  Mental status altered  UTI  MRSA positive  Bactroban  Hypoglycemia change solution to 5% dextrose  Code status:   DVT prophylaxis:     Care Plan discussed with: Nurse  Anticipated Disposition: SNF/LTC  Anticipated Discharge: Greater than 48 hours     Hospital Problems  Never Reviewed          Codes Class Noted POA    Hypernatremia ICD-10-CM: E87.0  ICD-9-CM: 276.0  10/17/2020 Unknown        UTI (urinary tract infection) ICD-10-CM: N39.0  ICD-9-CM: 599.0  10/17/2020 Unknown                Review of Systems:   A comprehensive review of systems was negative except for that written in the HPI. Vital Signs:    Last 24hrs VS reviewed since prior progress note. Most recent are:  Visit Vitals  /79 (BP 1 Location: Right arm, BP Patient Position: At rest)   Pulse 83   Temp 98.7 °F (37.1 °C)   Resp 18   SpO2 96%   Breastfeeding No         Intake/Output Summary (Last 24 hours) at 10/18/2020 1540  Last data filed at 10/18/2020 0539  Gross per 24 hour   Intake 1000 ml   Output 2150 ml   Net -1150 ml        Physical Examination:             Constitutional:  No acute distress,    ENT:  Oral mucosa moist, oropharynx benign. Resp:  CTA bilaterally. No wheezing/rhonchi/rales. No accessory muscle use   CV:  Regular rhythm, normal rate, no murmurs, gallops, rubs    GI:  Soft, non distended, non tender. normoactive bowel sounds, no hepatosplenomegaly     Musculoskeletal:  No edema, warm, 2+ pulses throughout    Neurologic:  Moves all extremities. AAOx3, CN II-XII reviewed     Skin is warm and dry         Data Review:    Review and/or order of clinical lab test      Labs:     Recent Labs     10/17/20  0030   WBC 5.7   HGB 13.8   HCT 47.6*   *     Recent Labs     10/18/20  0400 10/17/20  1605 10/17/20  0030   * 160* 163*   K 4.8 4.1 Hemolyzed, recollect requested   * 128* 136*   CO2 20* 29 26   BUN 22* 26* 38*   CREA 1.21* 1.52* 1.89*   GLU 61* 70 112*   CA 8.3* 8.2* 8.3*     Recent Labs     10/17/20  0030   ALT 16   *   TBILI 0.4   TP 7.2   ALB 2.9*   GLOB 4.3*     No results for input(s): INR, PTP, APTT, INREXT in the last 72 hours. No results for input(s): FE, TIBC, PSAT, FERR in the last 72 hours. No results found for: FOL, RBCF   No results for input(s): PH, PCO2, PO2 in the last 72 hours. No results for input(s): CPK, CKNDX, TROIQ in the last 72 hours.     No lab exists for component: CPKMB  No results found for: CHOL, CHOLX, CHLST, CHOLV, HDL, HDLP, LDL, LDLC, DLDLP, TGLX, TRIGL, TRIGP, CHHD, CHHDX  No results found for: Nereida Crowe  Lab Results   Component Value Date/Time    Color Yellow/Straw 10/17/2020 01:42 AM    Appearance Clear 10/17/2020 01:42 AM    Specific gravity 1.027 10/17/2020 01:42 AM    Specific gravity 1.012 11/30/2013 04:50 AM    pH (UA) 5.0 10/17/2020 01:42 AM    Protein Negative 10/17/2020 01:42 AM    Glucose Negative 10/17/2020 01:42 AM    Ketone Negative 10/17/2020 01:42 AM    Bilirubin Negative 10/17/2020 01:42 AM    Urobilinogen 0.1 10/17/2020 01:42 AM    Nitrites Negative 10/17/2020 01:42 AM    Leukocyte Esterase Negative 10/17/2020 01:42 AM    Epithelial cells FEW 11/30/2013 04:50 AM    Bacteria Negative 10/17/2020 01:42 AM    WBC 0-4 10/17/2020 01:42 AM    RBC 0-5 10/17/2020 01:42 AM     MRSA from the nares positive Bactroban    Medications Reviewed:     Current Facility-Administered Medications   Medication Dose Route Frequency    mupirocin (BACTROBAN) 2 % ointment   Topical BID    dextrose 5% infusion  125 mL/hr IntraVENous CONTINUOUS    acetaminophen (TYLENOL) tablet 650 mg  650 mg Oral Q6H PRN    cefTRIAXone (ROCEPHIN) 1 g in 0.9% sodium chloride (MBP/ADV) 50 mL MBP  1 g IntraVENous Q24H    sodium chloride (NS) flush 5-40 mL  5-40 mL IntraVENous Q8H    sodium chloride (NS) flush 5-40 mL  5-40 mL IntraVENous PRN    docusate sodium (COLACE) capsule 100 mg  100 mg Oral BID    enoxaparin (LOVENOX) injection 30 mg  30 mg SubCUTAneous Q24H     ______________________________________________________________________  EXPECTED LENGTH OF STAY: - - -  ACTUAL LENGTH OF STAY:          1                 Adryan Gallo MD

## 2020-10-18 NOTE — PROGRESS NOTES
Dual nurse skin assessment on admission done by  and Deborah Jacob. PEG tube in place, placement site has yellowish discharge, dry gauze dressing in place. All other areas of skin is dry and intact.

## 2020-10-19 LAB
ALBUMIN SERPL-MCNC: 2.8 G/DL (ref 3.5–5)
ANION GAP SERPL CALC-SCNC: 7 MMOL/L (ref 5–15)
BACTERIA SPEC CULT: NORMAL
BUN SERPL-MCNC: 12 MG/DL (ref 6–20)
BUN/CREAT SERPL: 12 (ref 12–20)
CA-I BLD-MCNC: 8.2 MG/DL (ref 8.5–10.1)
CHLORIDE SERPL-SCNC: 110 MMOL/L (ref 97–108)
CO2 SERPL-SCNC: 22 MMOL/L (ref 21–32)
CREAT SERPL-MCNC: 1.04 MG/DL (ref 0.55–1.02)
GLUCOSE SERPL-MCNC: 191 MG/DL (ref 65–100)
PHOSPHATE SERPL-MCNC: 1.7 MG/DL (ref 2.6–4.7)
POTASSIUM SERPL-SCNC: 2.8 MMOL/L (ref 3.5–5.1)
SODIUM SERPL-SCNC: 139 MMOL/L (ref 136–145)
SPECIAL REQUESTS,SREQ: NORMAL

## 2020-10-19 PROCEDURE — 74011250636 HC RX REV CODE- 250/636: Performed by: INTERNAL MEDICINE

## 2020-10-19 PROCEDURE — 74011250637 HC RX REV CODE- 250/637: Performed by: INTERNAL MEDICINE

## 2020-10-19 PROCEDURE — 65270000029 HC RM PRIVATE

## 2020-10-19 PROCEDURE — 80069 RENAL FUNCTION PANEL: CPT

## 2020-10-19 PROCEDURE — 94762 N-INVAS EAR/PLS OXIMTRY CONT: CPT

## 2020-10-19 PROCEDURE — 36415 COLL VENOUS BLD VENIPUNCTURE: CPT

## 2020-10-19 PROCEDURE — 74011000258 HC RX REV CODE- 258: Performed by: INTERNAL MEDICINE

## 2020-10-19 RX ORDER — POTASSIUM CHLORIDE 20 MEQ/1
40 TABLET, EXTENDED RELEASE ORAL
Status: COMPLETED | OUTPATIENT
Start: 2020-10-19 | End: 2020-10-19

## 2020-10-19 RX ORDER — SODIUM CHLORIDE 450 MG/100ML
75 INJECTION, SOLUTION INTRAVENOUS CONTINUOUS
Status: DISCONTINUED | OUTPATIENT
Start: 2020-10-19 | End: 2020-10-25

## 2020-10-19 RX ORDER — POTASSIUM CHLORIDE 7.45 MG/ML
10 INJECTION INTRAVENOUS
Status: COMPLETED | OUTPATIENT
Start: 2020-10-19 | End: 2020-10-19

## 2020-10-19 RX ADMIN — Medication 10 ML: at 22:37

## 2020-10-19 RX ADMIN — CEFTRIAXONE SODIUM 1 G: 1 INJECTION, POWDER, FOR SOLUTION INTRAMUSCULAR; INTRAVENOUS at 13:07

## 2020-10-19 RX ADMIN — POTASSIUM CHLORIDE 40 MEQ: 1500 TABLET, EXTENDED RELEASE ORAL at 22:36

## 2020-10-19 RX ADMIN — MUPIROCIN: 20 OINTMENT TOPICAL at 09:00

## 2020-10-19 RX ADMIN — POTASSIUM CHLORIDE 40 MEQ: 1500 TABLET, EXTENDED RELEASE ORAL at 16:53

## 2020-10-19 RX ADMIN — MUPIROCIN: 20 OINTMENT TOPICAL at 22:37

## 2020-10-19 RX ADMIN — POTASSIUM CHLORIDE 10 MEQ: 7.46 INJECTION, SOLUTION INTRAVENOUS at 17:56

## 2020-10-19 RX ADMIN — DEXTROSE MONOHYDRATE 125 ML/HR: 50 INJECTION, SOLUTION INTRAVENOUS at 03:22

## 2020-10-19 RX ADMIN — POTASSIUM CHLORIDE 40 MEQ: 1500 TABLET, EXTENDED RELEASE ORAL at 13:08

## 2020-10-19 RX ADMIN — POTASSIUM CHLORIDE 10 MEQ: 7.46 INJECTION, SOLUTION INTRAVENOUS at 15:26

## 2020-10-19 RX ADMIN — SODIUM CHLORIDE 75 ML/HR: 4.5 INJECTION, SOLUTION INTRAVENOUS at 13:07

## 2020-10-19 RX ADMIN — Medication 10 ML: at 13:20

## 2020-10-19 RX ADMIN — POTASSIUM CHLORIDE 10 MEQ: 7.46 INJECTION, SOLUTION INTRAVENOUS at 13:07

## 2020-10-19 RX ADMIN — ENOXAPARIN SODIUM 30 MG: 40 INJECTION SUBCUTANEOUS at 13:10

## 2020-10-19 RX ADMIN — POTASSIUM CHLORIDE 10 MEQ: 7.46 INJECTION, SOLUTION INTRAVENOUS at 16:54

## 2020-10-19 RX ADMIN — DOCUSATE SODIUM 100 MG: 100 CAPSULE, LIQUID FILLED ORAL at 22:36

## 2020-10-19 NOTE — CONSULTS
NAME:  Ladonna Jones   :   1950   MRN:   [id number]     ATTENDING: Vincent Hummel MD  PCP:  Dona Forman MD    Date/Time:  10/18/2020 10:19 PM      Subjective:   REQUESTING PHYSICIAN: Dr. Shea Martinez:    hypernatremia    Ms. Bisi Jacome is a 69-year-old female with a past medical history of hyponatremia, history of cerebral aneurysm repair, history of partial central diabetes insipidus history of seizure disorder history of DVT, presented to the emergency room with complaints of altered mental status. Patient is a nursing home resident, apparently found to have altered mental mental status with decreased mentation. Initial labs in the emergency room showed a high sodium of 161 and a nephrology consultation is requested for further evaluation and management of hyperatremia. Patient was started on hypotonic IV fluids half-normal saline at 150 mL/h. Repeat labs done this morning showed improvement in sodium to 151. Patient seen at bedside, she is alert and awake, confused and disoriented, probably at her baseline mental status. Not giving any specific complaints, no edema. She had a high creatinine of 1.52 which has improved to 1.21 with IV fluids. Patient's home medications were not listed at this time. High urine output noted    Past medical history: Significant for cerebral aneurysm repair, partial central diabetes insipidus was on DDAVP history of seizures, hyperlipidemia, dementia, ?chronic kidney disease    Past surgical history: Significant for brain aneurysm repair    Social History     Tobacco Use    Smoking status: Not on file   Substance Use Topics    Alcohol use: Not on file      No family history on file.     No Known Allergies   Prior to Admission medications    Not on File       REVIEW OF SYSTEMS:     Unable to review as patient is a poor historian         Objective:   VITALS:    Visit Vitals  /85 (BP 1 Location: Right arm, BP Patient Position: At rest;Head of bed elevated (Comment degrees))   Pulse 79   Temp 99.8 °F (37.7 °C)   Resp 18   SpO2 97%   Breastfeeding No     Temp (24hrs), Av.7 °F (37.1 °C), Min:97.6 °F (36.4 °C), Max:99.8 °F (37.7 °C)      PHYSICAL EXAM:   General: alert awake, no acute distress. AMS+  HEENT: EOMI, no Icterus, no Pallor, pupils reactive, mucosa dry, normal inspection of ears and nose, throat clear. Neck: Neck is supple, No JVD, no thyromegaly,   Lungs: breathsounds normal, no respiratory distress on inspection, no rhonchi, no rales,  CVS: heart sounds normal, regular rate and rhythm, no murmurs, no rubs. GI: soft, nontender, normal BS, no palpable organomegaly,  Extremeties: no clubbing, no cyanosis, no edema,  Neuro: Alert, awake, altered mental status   Probably mental status is at baseline now   skin: normal skin turgor, no skin rashes   Musculoskeletal: no acute joint swellings  LAB DATA REVIEWED:    Recent Results (from the past 24 hour(s))   MRSA SCREEN - PCR (NASAL)    Collection Time: 10/18/20  1:30 AM   Result Value Ref Range    MRSA by PCR, Nasal DETECTED (A) Not Detected     METABOLIC PANEL, BASIC    Collection Time: 10/18/20  4:00 AM   Result Value Ref Range    Sodium 151 (H) 136 - 145 mmol/L    Potassium 4.8 3.5 - 5.1 mmol/L    Chloride 124 (H) 97 - 108 mmol/L    CO2 20 (L) 21 - 32 mmol/L    Anion gap 7 5 - 15 mmol/L    Glucose 61 (L) 65 - 100 mg/dL    BUN 22 (H) 6 - 20 mg/dL    Creatinine 1.21 (H) 0.55 - 1.02 mg/dL    BUN/Creatinine ratio 18 12 - 20      GFR est AA 53 (L) >60 ml/min/1.73m2    GFR est non-AA 44 (L) >60 ml/min/1.73m2    Calcium 8.3 (L) 8.5 - 10.1 mg/dL        1. Hypernatremia: Admitted with altered mental status.    Initial sodium was high at 163   etiology is central diabetes insipidus and poor intake  Will resume on DDAVP 0.1 mg b.i.d. from tomorrow  Recommend to collect urine for electrolytes and osmolality  Improving sodium level with IV hydration , continue IV fluids with half-normal saline at 125 mils per hour  We will continue to monitor sodium levels      2. ?? History of chronic kidney disease stage III:   patient had fluctuating Renal functions probably related to superimposed dehydration with diabetes insipidus   We will continue to monitor renal functions to assess the baseline     3. Altered mental status: Patient has history of dementia, status post brain aneurysm resection with some residual neurological changes. Current Mental status changes are probably related to dehydration and hyponatremia ,   improving , mental status is probably around baseline now  Continue to monitor clinically    5. Renal osteodystrophy: Calcium is stable. I will check phosphorus, intact PTH, and vitamin D-25 hydroxyl level.        Signed: Neville Burris MD

## 2020-10-19 NOTE — CONSULTS
NAME:  Isaiah Vazquez   :   1950   MRN:   [id number]     ATTENDING: Sangeetha Piedra MD  PCP:  Cherie Davila MD    Date/Time:  10/19/2020 10:19 PM      Subjective:   REQUESTING PHYSICIAN: Dr. Sandra Orona:    hypernatremia    Patient seen at bedside, she is alert and awake, confused and disoriented, probably at her baseline mental status. Not giving any specific complaints, no edema. Urine output only 1250 mils in the past 24 hours    Past medical history: Significant for cerebral aneurysm repair, partial central diabetes insipidus was on DDAVP history of seizures, hyperlipidemia, dementia, ?chronic kidney disease    Past surgical history: Significant for brain aneurysm repair    Social History     Tobacco Use    Smoking status: Not on file   Substance Use Topics    Alcohol use: Not on file      No family history on file. No Known Allergies   Prior to Admission medications    Not on File       REVIEW OF SYSTEMS:     Unable to review as patient is a poor historian         Objective:   VITALS:    Visit Vitals  /87   Pulse 79   Temp 97.8 °F (36.6 °C)   Resp 18   SpO2 93%   Breastfeeding No     Temp (24hrs), Av.5 °F (36.9 °C), Min:97.7 °F (36.5 °C), Max:99.8 °F (37.7 °C)      PHYSICAL EXAM:   General: alert awake, no acute distress. AMS+  HEENT: EOMI, no Icterus, no Pallor, pupils reactive, mucosa dry, normal inspection of ears and nose, throat clear. Neck: Neck is supple, No JVD, no thyromegaly,   Lungs: breathsounds normal, no respiratory distress on inspection, no rhonchi, no rales,  CVS: heart sounds normal, regular rate and rhythm, no murmurs, no rubs.   GI: soft, nontender, normal BS, no palpable organomegaly,  Extremeties: no clubbing, no cyanosis, no edema,  Neuro: Alert, awake, altered mental status   Probably mental status is at baseline now   skin: normal skin turgor, no skin rashes   Musculoskeletal: no acute joint swellings  LAB DATA REVIEWED:    Recent Results (from the past 24 hour(s))   RENAL FUNCTION PANEL    Collection Time: 10/19/20  7:05 AM   Result Value Ref Range    Sodium 139 136 - 145 mmol/L    Potassium 2.8 (L) 3.5 - 5.1 mmol/L    Chloride 110 (H) 97 - 108 mmol/L    CO2 22 21 - 32 mmol/L    Anion gap 7 5 - 15 mmol/L    Glucose 191 (H) 65 - 100 mg/dL    BUN 12 6 - 20 mg/dL    Creatinine 1.04 (H) 0.55 - 1.02 mg/dL    BUN/Creatinine ratio 12 12 - 20      GFR est AA >60 >60 ml/min/1.73m2    GFR est non-AA 52 (L) >60 ml/min/1.73m2    Calcium 8.2 (L) 8.5 - 10.1 mg/dL    Phosphorus 1.7 (L) 2.6 - 4.7 mg/dL    Albumin 2.8 (L) 3.5 - 5.0 g/dL        1. Hypernatremia: Admitted with altered mental status. Initial sodium was high at 163   etiology is central diabetes insipidus and poor intake  Sodium has improved to 139 today with hypotonic IV fluids. She was scheduled to restart DDAVP 0.1 mg b.i.d. from today, which I will hold for now because of improving urine output and sodium. We will discontinue D5W and start half-normal saline  Recommend to collect urine for electrolytes and osmolality  We will continue to monitor sodium levels      2. ?? History of chronic kidney disease stage III:   patient had fluctuating Renal functions probably related to superimposed dehydration with diabetes insipidus   Creatinine is improved to 1.0  We will continue to monitor renal functions to assess the baseline     3. Altered mental status:   Patient has history of dementia, status post brain aneurysm resection with some residual neurological changes. Current Mental status changes are probably related to dehydration and hyponatremia ,   improving , mental status is probably around baseline now  Continue to monitor clinically    4. Severe hypokalemia  -K is only 2.8. I will give 3 doses of po KCl 40 mEq of oral KCl    5. Renal osteodystrophy: Calcium is stable. Phosphorus is low at 1.7. I will replace with 4phosNaK  pending intact PTH, and vitamin D-25 hydroxyl level. Signed: Franco Zaldivar MD

## 2020-10-19 NOTE — ROUTINE PROCESS
Bedside and Verbal shift change report given to  Asaf Love RN  (oncoming nurse) by Susy Sanchez RN (offgoing nurse). Report included the following information SBAR, MAR and Cardiac Rhythm Normal Sinus Rhythm.

## 2020-10-19 NOTE — PROGRESS NOTES
Hospitalist Progress Note            Date of Service:  10/19/2020  NAME:  Junior Hernandez  :  1950  MRN:  [id number]      Admission Summary:   71-year-old patient admitted for UTI, severe hyponatremia on IV hypotonic solutions with improving sodium level. Also complained of left shoulder hyperemia. COVID-19 result is still pending    Interval history / Subjective:   Patient is more alert     Assessment & Plan:     Acute severe hyper natremia which is resolved on hypotonic solution  UTI  Encephalopathy metabolic acute  [id number] test result pending    Code status:   DVT prophylaxis:     Care Plan discussed with: Patient/Family  Anticipated Disposition: SNF/LTC  Anticipated Discharge: 24 hours to 48 hours     Hospital Problems  Never Reviewed          Codes Class Noted POA    Hypernatremia ICD-10-CM: E87.0  ICD-9-CM: 276.0  10/17/2020 Unknown        UTI (urinary tract infection) ICD-10-CM: N39.0  ICD-9-CM: 599.0  10/17/2020 Unknown                Review of Systems:   A comprehensive review of systems was negative except for that written in the HPI. Vital Signs:    Last 24hrs VS reviewed since prior progress note. Most recent are:  Visit Vitals  BP (!) 143/88   Pulse 74   Temp 98 °F (36.7 °C)   Resp 18   SpO2 93%   Breastfeeding No         Intake/Output Summary (Last 24 hours) at 10/19/2020 1848  Last data filed at 10/19/2020 0340  Gross per 24 hour   Intake    Output 1000 ml   Net -1000 ml        Physical Examination:             Constitutional:  No acute distress, cooperative, pleasant    ENT:  Oral mucosa moist, oropharynx benign. Resp:  CTA bilaterally. No wheezing/rhonchi/rales. No accessory muscle use   CV:  Regular rhythm, normal rate, no murmurs, gallops, rubs    GI:  Soft, non distended, non tender.  normoactive bowel sounds, no hepatosplenomegaly     Musculoskeletal:  No edema, warm, 2+ pulses throughout Neurologic:  Moves all extremities. AAOx3, CN II-XII reviewed     Psych:  Good insight, Not anxious nor agitated. Skin:  Good turgor, no rashes or ulcers and rash  Hematologic/Lymphatic/Immunlogic:  No jaundice nor lymph node swelling  Eyes:  EOMI. Anicteric sclerae, PERRL. Data Review:    Review and/or order of clinical lab test      Labs:     Recent Labs     10/17/20  0030   WBC 5.7   HGB 13.8   HCT 47.6*   *     Recent Labs     10/19/20  0705 10/18/20  0400 10/17/20  1605    151* 160*   K 2.8* 4.8 4.1   * 124* 128*   CO2 22 20* 29   BUN 12 22* 26*   CREA 1.04* 1.21* 1.52*   * 61* 70   CA 8.2* 8.3* 8.2*   PHOS 1.7*  --   --      Recent Labs     10/19/20  0705 10/17/20  0030   ALT  --  16   AP  --  140*   TBILI  --  0.4   TP  --  7.2   ALB 2.8* 2.9*   GLOB  --  4.3*     No results for input(s): INR, PTP, APTT, INREXT in the last 72 hours. No results for input(s): FE, TIBC, PSAT, FERR in the last 72 hours. No results found for: FOL, RBCF   No results for input(s): PH, PCO2, PO2 in the last 72 hours. No results for input(s): CPK, CKNDX, TROIQ in the last 72 hours.     No lab exists for component: CPKMB  No results found for: CHOL, CHOLX, CHLST, CHOLV, HDL, HDLP, LDL, LDLC, DLDLP, TGLX, TRIGL, TRIGP, CHHD, CHHDX  No results found for: [facility]  Lab Results   Component Value Date/Time    Color Yellow/Straw 10/17/2020 01:42 AM    Appearance Clear 10/17/2020 01:42 AM    Specific gravity 1.027 10/17/2020 01:42 AM    Specific gravity 1.012 11/30/2013 04:50 AM    pH (UA) 5.0 10/17/2020 01:42 AM    Protein Negative 10/17/2020 01:42 AM    Glucose Negative 10/17/2020 01:42 AM    Ketone Negative 10/17/2020 01:42 AM    Bilirubin Negative 10/17/2020 01:42 AM    Urobilinogen 0.1 10/17/2020 01:42 AM    Nitrites Negative 10/17/2020 01:42 AM    Leukocyte Esterase Negative 10/17/2020 01:42 AM    Epithelial cells FEW 11/30/2013 04:50 AM    Bacteria Negative 10/17/2020 01:42 AM    WBC 0-4 10/17/2020 01:42 AM    RBC 0-5 10/17/2020 01:42 AM         Medications Reviewed:     Current Facility-Administered Medications   Medication Dose Route Frequency    0.45% sodium chloride infusion  75 mL/hr IntraVENous CONTINUOUS    potassium chloride 10 mEq in 100 ml IVPB  10 mEq IntraVENous Q1H    phosphorus (K PHOS NEUTRAL) 250 mg tablet 2 Tab  2 Tab Oral BID    potassium chloride SR (KLOR-CON 10) tablet 40 mEq  40 mEq Oral NOW    mupirocin (BACTROBAN) 2 % ointment   Topical BID    acetaminophen (TYLENOL) tablet 650 mg  650 mg Oral Q6H PRN    cefTRIAXone (ROCEPHIN) 1 g in 0.9% sodium chloride (MBP/ADV) 50 mL MBP  1 g IntraVENous Q24H    sodium chloride (NS) flush 5-40 mL  5-40 mL IntraVENous Q8H    sodium chloride (NS) flush 5-40 mL  5-40 mL IntraVENous PRN    docusate sodium (COLACE) capsule 100 mg  100 mg Oral BID    enoxaparin (LOVENOX) injection 30 mg  30 mg SubCUTAneous Q24H     ______________________________________________________________________  EXPECTED LENGTH OF STAY: - - -  ACTUAL LENGTH OF STAY:          2                 Gregorio Garcia MD

## 2020-10-19 NOTE — WOUND CARE
IP WOUND CONSULT Lowell Nick MEDICAL RECORD NUMBER:  [id number] AGE: 69 y.o. GENDER: female  : 1950 TODAY'S DATE:  10/19/2020 GENERAL  
 
[] Follow-up [x] New Consult Lowell Nick is a 69 y.o. female referred by:  
[x] Physician 
[] Nursing 
[] Other: PAST MEDICAL HISTORY No past medical history on file. PAST SURGICAL HISTORY No past surgical history on file. FAMILY HISTORY No family history on file. ALLERGIES No Known Allergies MEDICATIONS No current facility-administered medications on file prior to encounter. No current outpatient medications on file prior to encounter. [unfilled] Visit Vitals /87 Pulse 76 Temp 97.8 °F (36.6 °C) Resp 18 SpO2 93% Breastfeeding No  
 
 
ASSESSMENT Wound Identification: Abdomen at PEG site Wound Type: MASD due to drainage from PEG, slightly denuded. Dressing change: Yes Contributing Factors: incontinence of stool and incontinence of urine Wound Abdomen Partial thickness tissue loss from MASD related to PEG drainage. 10/19/20 (Active) Dressing Status Old drainage 10/19/20 1443 Dressing Type Gauze 10/19/20 1443 Non-staged Wound Description Partial thickness 10/19/20 1443 Condition of Base Epithelializing;Granulation 10/19/20 1443 Condition of Edges Open 10/19/20 1443 Assessment Drainage;[place] 10/19/20 1443 Drainage Amount Small 10/19/20 1443 Drainage Color Elio Albarran 10/19/20 1443 Wound Odor None 10/19/20 1443 Isabella-wound Assessment Denuded;Fragile 10/19/20 1443 Cleansing and Cleansing Agents  Normal saline 10/19/20 1443 Dressing Changed Changed/New 10/19/20 1443 Dressing Type Applied Gauze 10/19/20 1443 Number of days: 0 PLAN Skin Care & Pressure Relief Recommendations Minimize layers of linen Turn/reposition approximately every 2 hours Pillow wedges Manage incontinence Promote continence; Skin Protective lotion/cream to buttocks and sacrum daily and as needed with incontinence care Physician/Provider notified:  
Recommendations: Patient needs bumper on PEG tubing for stability. Keep site clean and dry according to order. Teaching completed with:  
[] Patient          
[] Family member      
[] Caregiver         
[] Nursing 
[] Other Patient/Caregiver Teaching: 
Level of patient/caregiver understanding able to:  
[] Indicates understanding       [] Needs reinforcement 
[] Unsuccessful      [] Verbal Understanding 
[] Demonstrated understanding       [] No evidence of learning 
[] Refused teaching         [] N/A Electronically signed by Laurie Ledbetter RN on 10/19/2020 at 2:47 PM

## 2020-10-20 ENCOUNTER — APPOINTMENT (OUTPATIENT)
Dept: CT IMAGING | Age: 70
DRG: 640 | End: 2020-10-20
Attending: INTERNAL MEDICINE
Payer: MEDICARE

## 2020-10-20 LAB
ALBUMIN SERPL-MCNC: 2.9 G/DL (ref 3.5–5)
ANION GAP SERPL CALC-SCNC: 9 MMOL/L (ref 5–15)
BUN SERPL-MCNC: 11 MG/DL (ref 6–20)
BUN/CREAT SERPL: 9 (ref 12–20)
CA-I BLD-MCNC: 8.5 MG/DL (ref 8.5–10.1)
CHLORIDE SERPL-SCNC: 109 MMOL/L (ref 97–108)
CO2 SERPL-SCNC: 18 MMOL/L (ref 21–32)
CREAT SERPL-MCNC: 1.17 MG/DL (ref 0.55–1.02)
GLUCOSE BLD STRIP.AUTO-MCNC: 106 MG/DL (ref 65–100)
GLUCOSE SERPL-MCNC: 92 MG/DL (ref 65–100)
MAGNESIUM SERPL-MCNC: 1.8 MG/DL (ref 1.6–2.4)
PERFORMED BY, TECHID: ABNORMAL
PHOSPHATE SERPL-MCNC: 2.7 MG/DL (ref 2.6–4.7)
POTASSIUM SERPL-SCNC: 4.5 MMOL/L (ref 3.5–5.1)
SARS-COV-2, COV2NT: NOT DETECTED
SODIUM SERPL-SCNC: 136 MMOL/L (ref 136–145)

## 2020-10-20 PROCEDURE — 36415 COLL VENOUS BLD VENIPUNCTURE: CPT

## 2020-10-20 PROCEDURE — 82962 GLUCOSE BLOOD TEST: CPT

## 2020-10-20 PROCEDURE — 74011250636 HC RX REV CODE- 250/636: Performed by: INTERNAL MEDICINE

## 2020-10-20 PROCEDURE — 74011250637 HC RX REV CODE- 250/637: Performed by: INTERNAL MEDICINE

## 2020-10-20 PROCEDURE — 65270000029 HC RM PRIVATE

## 2020-10-20 PROCEDURE — 80069 RENAL FUNCTION PANEL: CPT

## 2020-10-20 PROCEDURE — 70450 CT HEAD/BRAIN W/O DYE: CPT

## 2020-10-20 PROCEDURE — 83735 ASSAY OF MAGNESIUM: CPT

## 2020-10-20 PROCEDURE — 74011000258 HC RX REV CODE- 258: Performed by: INTERNAL MEDICINE

## 2020-10-20 PROCEDURE — 95816 EEG AWAKE AND DROWSY: CPT | Performed by: INTERNAL MEDICINE

## 2020-10-20 RX ORDER — LEVETIRACETAM 5 MG/ML
500 INJECTION INTRAVASCULAR EVERY 12 HOURS
Status: DISCONTINUED | OUTPATIENT
Start: 2020-10-21 | End: 2020-10-24

## 2020-10-20 RX ORDER — LORAZEPAM 2 MG/ML
1 INJECTION INTRAMUSCULAR
Status: DISCONTINUED | OUTPATIENT
Start: 2020-10-20 | End: 2020-10-26

## 2020-10-20 RX ORDER — LEVETIRACETAM 10 MG/ML
1000 INJECTION INTRAVASCULAR ONCE
Status: COMPLETED | OUTPATIENT
Start: 2020-10-20 | End: 2020-10-20

## 2020-10-20 RX ADMIN — DOCUSATE SODIUM 100 MG: 100 CAPSULE, LIQUID FILLED ORAL at 21:57

## 2020-10-20 RX ADMIN — Medication 10 ML: at 06:00

## 2020-10-20 RX ADMIN — MUPIROCIN: 20 OINTMENT TOPICAL at 21:57

## 2020-10-20 RX ADMIN — DIBASIC SODIUM PHOSPHATE, MONOBASIC POTASSIUM PHOSPHATE AND MONOBASIC SODIUM PHOSPHATE 2 TABLET: 852; 155; 130 TABLET ORAL at 01:29

## 2020-10-20 RX ADMIN — LEVETIRACETAM 1000 MG: 10 INJECTION INTRAVENOUS at 21:58

## 2020-10-20 RX ADMIN — CEFTRIAXONE SODIUM 1 G: 1 INJECTION, POWDER, FOR SOLUTION INTRAMUSCULAR; INTRAVENOUS at 12:34

## 2020-10-20 RX ADMIN — Medication 10 ML: at 12:34

## 2020-10-20 RX ADMIN — Medication 10 ML: at 21:57

## 2020-10-20 RX ADMIN — DIBASIC SODIUM PHOSPHATE, MONOBASIC POTASSIUM PHOSPHATE AND MONOBASIC SODIUM PHOSPHATE 2 TABLET: 852; 155; 130 TABLET ORAL at 21:00

## 2020-10-20 RX ADMIN — ENOXAPARIN SODIUM 30 MG: 40 INJECTION SUBCUTANEOUS at 12:34

## 2020-10-20 NOTE — ROUTINE PROCESS
Bedside and Verbal shift change report given to Lord Moser (oncoming nurse) by Jas Gardner RN (offgoing nurse). Report included the following information SBAR, Intake/Output, MAR and Cardiac Rhythm Normal Sinus Rhythm.

## 2020-10-20 NOTE — CONSULTS
NAME:  Trip Morales   :   1950   MRN:   [id number]     ATTENDING: Joyce Abdul MD  PCP:  Ana Montes MD    Date/Time:  10/20/2020 10:19 PM      Subjective:   REQUESTING PHYSICIAN: Dr. Dara Chairez:    hypernatremia    Patient seen at bedside, apparently she had a seizure-like activity this morning   urine output not recorded in the past 24 hours    Past medical history: Significant for cerebral aneurysm repair, partial central diabetes insipidus was on DDAVP history of seizures, hyperlipidemia, dementia, ?chronic kidney disease    Past surgical history: Significant for brain aneurysm repair    Social History     Tobacco Use    Smoking status: Not on file   Substance Use Topics    Alcohol use: Not on file      No family history on file. No Known Allergies   Prior to Admission medications    Not on File       REVIEW OF SYSTEMS:     Unable to review as patient is a poor historian         Objective:   VITALS:    Visit Vitals  /86   Pulse 86   Temp 98 °F (36.7 °C)   Resp 18   SpO2 93%   Breastfeeding No     Temp (24hrs), Av °F (36.7 °C), Min:98 °F (36.7 °C), Max:98 °F (36.7 °C)      PHYSICAL EXAM:   General: awake, no acute distress. AMS+  HEENT: EOMI, no Icterus, no Pallor, pupils reactive, mucosa dry, normal inspection of ears and nose, throat clear. Neck: Neck is supple, No JVD, no thyromegaly,   Lungs: breathsounds normal, no respiratory distress on inspection, no rhonchi, no rales,  CVS: heart sounds normal, regular rate and rhythm, no murmurs, no rubs.   GI: soft, nontender, normal BS, no palpable organomegaly,  Extremeties: no clubbing, no cyanosis, no edema,  Neuro: Alert, awake, altered mental status   Probably mental status is at baseline now   skin: normal skin turgor, no skin rashes   Musculoskeletal: no acute joint swellings    LAB DATA REVIEWED:    Recent Results (from the past 24 hour(s))   GLUCOSE, POC    Collection Time: 10/20/20 10:01 AM   Result Value Ref Range    Glucose (POC) 106 (H) 65 - 100 mg/dL    Performed by Kimberley Yu         1. Hypernatremia: Admitted with altered mental status. Initial sodium was high at 163   etiology is central diabetes insipidus and poor intake  Sodium had improved to 139 yesterday with hypotonic IV fluids. I discontinued DDAVP and D5W yesterday to avoid overcorrection/hyponatremia . not noted to be polyuric   Labs today are pending   Currently on 25 mils per hour half-normal saline   I will check stat renal panel  We will continue to monitor sodium levels      2. ?? History of chronic kidney disease stage III:   patient had fluctuating Renal functions probably related to superimposed dehydration with diabetes insipidus   Creatinine is improved to 1.0  We will continue to monitor renal functions to assess the baseline     3. Altered mental status:   Patient has history of dementia, status post brain aneurysm resection with some residual neurological changes. Current Mental status changes are probably related to dehydration and hyponatremia ,   improving , mental status is probably around baseline now  Continue to monitor clinically    4. Severe hypokalemia  -K is only 2.8 today. S/p 3 doses of po KCl 40 mEq of oral KCl  Will do stat renal panel now    5. Renal osteodystrophy: Calcium is stable. Phosphorus is low at 1.7. S/p replace with phosNaK  pending intact PTH, and vitamin D-25 hydroxyl level.        Signed: Tai Pisano MD

## 2020-10-20 NOTE — PROGRESS NOTES
OT eval order received and acknowledged. OT eval attempted at 9:53 am 10/20/2020. However when laying pt flat to remove bed pan pt with noted seizure like activity and not responding to verbal or tactile stimuli. Rapid response called, nsg responded and stated pt had h/o seizures, rapid cleared, pt left in care of nsg. Will continue to follow patient and attempt OT eval at a later date. Thank you.

## 2020-10-20 NOTE — PROGRESS NOTES
IV catheter to left AC discontinued s/t infiltration. PICC team notified of need for IV since patient is difficult IV placement. PICC team in room to attempt placement but patient refused and IV catheter insertion.   Dr. Johann Curtis to be notified of patient's refusal.

## 2020-10-20 NOTE — ROUTINE PROCESS
PT eval order received and acknowledged. PT eval attempted 2x on 10/20/2020. First attempt at [facility] however pt was on bedpan and requested PT return later, 2nd attempt at 3959 however when laying pt flat to remove bed pan pt with noted seizure like activity not responding to verbal or tactile stimuli. Rapid response called, nsg responded stated pt had h/o seizures, rapid cleared, pt left in care of nsg. Will continue to follow patient and attempt PT eval at a later date. Thank you.

## 2020-10-20 NOTE — PROGRESS NOTES
Hospitalist Progress Note            Date of Service:  10/20/2020  NAME:  Sydney Richardson  :  1950  MRN:  [id number]      Admission Summary:   19-year-old patient admitted for UTI, severe hyponatremia on IV hypotonic solutions with improving sodium level. Also complained of left shoulder hyperemia. COVID-19 result is still pending    Interval history / Subjective:   Patient is more alert     Assessment & Plan:     Acute severe hyper natremia which is resolved on hypotonic solution  UTI  Encephalopathy metabolic acute  KBEBT-50 test resultnegative  DNA OT upon discharge in 1 to 2 days  Code status:   DVT prophylaxis:     Care Plan discussed with: Patient/Family  Anticipated Disposition: SNF/LTC  Anticipated Discharge: 24 hours to 48 hours     Hospital Problems  Never Reviewed          Codes Class Noted POA    Hypernatremia ICD-10-CM: E87.0  ICD-9-CM: 276.0  10/17/2020 Unknown        UTI (urinary tract infection) ICD-10-CM: N39.0  ICD-9-CM: 599.0  10/17/2020 Unknown                Review of Systems:   A comprehensive review of systems was negative except for that written in the HPI. Vital Signs:    Last 24hrs VS reviewed since prior progress note. Most recent are:  Visit Vitals  /86   Pulse 91   Temp 98.2 °F (36.8 °C)   Resp 18   SpO2 93%   Breastfeeding No         Intake/Output Summary (Last 24 hours) at 10/20/2020 1835  Last data filed at 10/20/2020 0400  Gross per 24 hour   Intake    Output 800 ml   Net -800 ml        Physical Examination:             Constitutional:  No acute distress, cooperative, pleasant    ENT:  Oral mucosa moist, oropharynx benign. Resp:  CTA bilaterally. No wheezing/rhonchi/rales. No accessory muscle use   CV:  Regular rhythm, normal rate, no murmurs, gallops, rubs    GI:  Soft, non distended, non tender.  normoactive bowel sounds, no hepatosplenomegaly     Musculoskeletal:  No edema, warm, 2+ pulses throughout    Neurologic:  Moves all extremities. AAOx3, CN II-XII reviewed     Psych:  Good insight, Not anxious nor agitated. Skin:  Good turgor, no rashes or ulcers and rash  Hematologic/Lymphatic/Immunlogic:  No jaundice nor lymph node swelling  Eyes:  EOMI. Anicteric sclerae, PERRL. Data Review:    Review and/or order of clinical lab test      Labs:     No results for input(s): WBC, HGB, HCT, PLT, HGBEXT, HCTEXT, PLTEXT, HGBEXT, HCTEXT, PLTEXT in the last 72 hours. Recent Labs     10/20/20  1345 10/19/20  0705 10/18/20  0400    139 151*   K 4.5 2.8* 4.8   * 110* 124*   CO2 18* 22 20*   BUN 11 12 22*   CREA 1.17* 1.04* 1.21*   GLU 92 191* 61*   CA 8.5 8.2* 8.3*   MG 1.8  --   --    PHOS 2.7 1.7*  --      Recent Labs     10/20/20  1345 10/19/20  0705   ALB 2.9* 2.8*     No results for input(s): INR, PTP, APTT, INREXT, INREXT in the last 72 hours. No results for input(s): FE, TIBC, PSAT, FERR in the last 72 hours. No results found for: FOL, RBCF   No results for input(s): PH, PCO2, PO2 in the last 72 hours. No results for input(s): CPK, CKNDX, TROIQ in the last 72 hours.     No lab exists for component: CPKMB  No results found for: CHOL, CHOLX, CHLST, CHOLV, HDL, HDLP, LDL, LDLC, DLDLP, TGLX, TRIGL, TRIGP, CHHD, CHHDX  Lab Results   Component Value Date/Time    Glucose (POC) 106 (H) 10/20/2020 10:01 AM     Lab Results   Component Value Date/Time    Color Yellow/Straw 10/17/2020 01:42 AM    Appearance Clear 10/17/2020 01:42 AM    Specific gravity 1.027 10/17/2020 01:42 AM    Specific gravity 1.012 11/30/2013 04:50 AM    pH (UA) 5.0 10/17/2020 01:42 AM    Protein Negative 10/17/2020 01:42 AM    Glucose Negative 10/17/2020 01:42 AM    Ketone Negative 10/17/2020 01:42 AM    Bilirubin Negative 10/17/2020 01:42 AM    Urobilinogen 0.1 10/17/2020 01:42 AM    Nitrites Negative 10/17/2020 01:42 AM    Leukocyte Esterase Negative 10/17/2020 01:42 AM    Epithelial cells FEW 11/30/2013 04:50 AM    Bacteria Negative 10/17/2020 01:42 AM    WBC 0-4 10/17/2020 01:42 AM    RBC 0-5 10/17/2020 01:42 AM         Medications Reviewed:     Current Facility-Administered Medications   Medication Dose Route Frequency    0.45% sodium chloride infusion  25 mL/hr IntraVENous CONTINUOUS    phosphorus (K PHOS NEUTRAL) 250 mg tablet 2 Tab  2 Tab Oral BID    mupirocin (BACTROBAN) 2 % ointment   Topical BID    acetaminophen (TYLENOL) tablet 650 mg  650 mg Oral Q6H PRN    cefTRIAXone (ROCEPHIN) 1 g in 0.9% sodium chloride (MBP/ADV) 50 mL MBP  1 g IntraVENous Q24H    sodium chloride (NS) flush 5-40 mL  5-40 mL IntraVENous Q8H    sodium chloride (NS) flush 5-40 mL  5-40 mL IntraVENous PRN    docusate sodium (COLACE) capsule 100 mg  100 mg Oral BID    enoxaparin (LOVENOX) injection 30 mg  30 mg SubCUTAneous Q24H     ______________________________________________________________________  EXPECTED LENGTH OF STAY: - - -  ACTUAL LENGTH OF STAY:          3                 Yaneth Combs MD

## 2020-10-21 PROCEDURE — 74011250637 HC RX REV CODE- 250/637: Performed by: INTERNAL MEDICINE

## 2020-10-21 PROCEDURE — 74011250636 HC RX REV CODE- 250/636: Performed by: INTERNAL MEDICINE

## 2020-10-21 PROCEDURE — 74011000258 HC RX REV CODE- 258: Performed by: INTERNAL MEDICINE

## 2020-10-21 PROCEDURE — 65270000029 HC RM PRIVATE

## 2020-10-21 RX ADMIN — LEVETIRACETAM 500 MG: 5 INJECTION INTRAVENOUS at 13:10

## 2020-10-21 RX ADMIN — LORAZEPAM 1 MG: 2 INJECTION, SOLUTION INTRAMUSCULAR; INTRAVENOUS at 17:38

## 2020-10-21 RX ADMIN — DIBASIC SODIUM PHOSPHATE, MONOBASIC POTASSIUM PHOSPHATE AND MONOBASIC SODIUM PHOSPHATE 2 TABLET: 852; 155; 130 TABLET ORAL at 13:09

## 2020-10-21 RX ADMIN — Medication 10 ML: at 13:10

## 2020-10-21 RX ADMIN — MUPIROCIN: 20 OINTMENT TOPICAL at 21:00

## 2020-10-21 RX ADMIN — CEFTRIAXONE SODIUM 1 G: 1 INJECTION, POWDER, FOR SOLUTION INTRAMUSCULAR; INTRAVENOUS at 11:22

## 2020-10-21 RX ADMIN — LEVETIRACETAM 500 MG: 5 INJECTION INTRAVENOUS at 22:10

## 2020-10-21 RX ADMIN — Medication 10 ML: at 05:12

## 2020-10-21 RX ADMIN — Medication 10 ML: at 22:00

## 2020-10-21 RX ADMIN — LEVETIRACETAM 500 MG: 5 INJECTION INTRAVENOUS at 00:53

## 2020-10-21 RX ADMIN — MUPIROCIN: 20 OINTMENT TOPICAL at 13:10

## 2020-10-21 RX ADMIN — ENOXAPARIN SODIUM 30 MG: 40 INJECTION SUBCUTANEOUS at 13:10

## 2020-10-21 NOTE — ANTIMICROBIAL STEWARDSHIP
The Antimicrobial Stewardship Team has reviewed current therapy. Patient is on day #5 of Rocephin therapy for UTI. WBCs are WNL, patient had normal UA, urine culture is showing no growth. Consider d/c Rocephin, as patient has completed adequate duration of therapy. Rocephin also puts the patient at additional risk for developing resistance, along with C.diff.

## 2020-10-21 NOTE — PROGRESS NOTES
Problem: Falls - Risk of  Goal: *Absence of Falls  Description: Document Tracie Loren Fall Risk and appropriate interventions in the flowsheet.   Outcome: Progressing Towards Goal  Note: Fall Risk Interventions:  Mobility Interventions: Bed/chair exit alarm    Mentation Interventions: Bed/chair exit alarm    Medication Interventions: Bed/chair exit alarm    Elimination Interventions: Call light in reach    History of Falls Interventions: Bed/chair exit alarm, Room close to nurse's station

## 2020-10-21 NOTE — CONSULTS
NEURO CONSULT      REASON FOR ADMISSION:  Mental status changes      HISTORY:    Ms. Jaspreet Luong is a 74-year-old lady from a nursing home who is consulted to neurology for mental status changes. Patient's mental status was getting worse progressively and she was subsequently brought to the ER. In the ER, patient had a head CT without contrast that did not show any acute process. She has been in 4 S. She was evaluated for Covid and she is COVID-19 negative. Patient is also hyponatremic and has other metabolic abnormalities. Patient mental status  is improving.         ROS:    General:                     No fever, no chills, no sweats, no generalized weakness, no weight loss/gain,                                       No loss of appetite   Eyes:                           No blurred vision, no eye pain, no loss of vision, no double vision  ENT:                            rhinorrhea, no pharyngitis   Respiratory:               No cough, no sputum production, no SOB, no CANALES, no wheezing, no pleuritic pain   Cardiology:                No chest pain, no palpitations, no orthopnea, no PND, no edema, no syncope   Gastrointestinal:       No abdominal pain , no N/V, no diarrhea, no dysphagia, no constipation, no bleeding   Genitourinary:           frequency, no urgency, no dysuria, no hematuria, no incontinence   Muskuloskeletal :      No arthralgia, no myalgia, no back pain  Hematology:              No easy bruising, no nose or gum bleeding, no lymphadenopathy   Dermatological:         No rash, no ulceration, no pruritis, no color change / jaundice  Endocrine:                 hot flashes or polydipsia   Neurological:             No headache, no dizziness, no confusion, no focal weakness, no paresthesia,                                      No Speech difficulties, no memory loss, no gait difficulty  Psychological:          No neelings of anxiety, no depression, no agitation      NEURO EXAM:    Mental status: Patient is awake slightly lethargic but responds verbally. She follows one-step commands with mild coaxing. She is nonaphasic    Cranial nerves: Cranial nerve exam is intact     Motor exam: Patient is diffusely weak. Reflexes are depressed    Sensory exam: There is no tactile extinction. Romberg was not tested    Coordination: Coordination for finger-to-nose is fair heel-to-shin is poor    Gait and Station: Patient was not ambulated    ASSESSMENT:  Mental status changes likely secondary to metabolic abnormalities. Patient also has a UTI. In the presence of the above 2, it is not unusual for a nursing home patient was already debilitated to develop mental status changes slowly.       PLAN:  Metabolic abnormalities being addressed  Seizure precautions  Ammonia level  EEG      ALLERGIES:    No Known Allergies    MEDS:      Current Facility-Administered Medications:     levETIRAcetam (KEPPRA) 500 mg in saline (iso-osm) 100 mL IVPB (premix), 500 mg, IntraVENous, Q12H, Randall Hernandes MD, 500 mg at 10/21/20 0053    LORazepam (ATIVAN) injection 1 mg, 1 mg, IntraVENous, Q6H PRN, Renetta SMITH MD    0.45% sodium chloride infusion, 25 mL/hr, IntraVENous, CONTINUOUS, Randall Hernandes MD, Last Rate: 25 mL/hr at 10/19/20 2236, 25 mL/hr at 10/19/20 2236    phosphorus (K PHOS NEUTRAL) 250 mg tablet 2 Tab, 2 Tab, Oral, BID, Renetta SMITH MD, 2 Tab at 10/20/20 2100    mupirocin (BACTROBAN) 2 % ointment, , Topical, BID, Randall Hernandes MD    acetaminophen (TYLENOL) tablet 650 mg, 650 mg, Oral, Q6H PRN, Kirstin Rivera MD    cefTRIAXone (ROCEPHIN) 1 g in 0.9% sodium chloride (MBP/ADV) 50 mL MBP, 1 g, IntraVENous, Q24H, Randall Hernandes MD, Last Rate: 100 mL/hr at 10/20/20 1234, 1 g at 10/20/20 1234    sodium chloride (NS) flush 5-40 mL, 5-40 mL, IntraVENous, Q8H, Randall Hernandes MD, 10 mL at 10/21/20 0512    sodium chloride (NS) flush 5-40 mL, 5-40 mL, IntraVENous, PRN, Martin Delvalle MD    docusate sodium (COLACE) capsule 100 mg, 100 mg, Oral, BID, Charo Cellar, Randall SMITH MD, 100 mg at 10/20/20 2157    enoxaparin (LOVENOX) injection 30 mg, 30 mg, SubCUTAneous, Q24H, Nita SMITH MD, 30 mg at 10/20/20 1234    LABS:  Recent Results (from the past 24 hour(s))   GLUCOSE, POC    Collection Time: 10/20/20 10:01 AM   Result Value Ref Range    Glucose (POC) 106 (H) 65 - 100 mg/dL    Performed by Pasty Scheuermann    MAGNESIUM    Collection Time: 10/20/20  1:45 PM   Result Value Ref Range    Magnesium 1.8 1.6 - 2.4 mg/dL   RENAL FUNCTION PANEL    Collection Time: 10/20/20  1:45 PM   Result Value Ref Range    Sodium 136 136 - 145 mmol/L    Potassium 4.5 3.5 - 5.1 mmol/L    Chloride 109 (H) 97 - 108 mmol/L    CO2 18 (L) 21 - 32 mmol/L    Anion gap 9 5 - 15 mmol/L    Glucose 92 65 - 100 mg/dL    BUN 11 6 - 20 mg/dL    Creatinine 1.17 (H) 0.55 - 1.02 mg/dL    BUN/Creatinine ratio 9 (L) 12 - 20      GFR est AA 55 (L) >60 ml/min/1.73m2    GFR est non-AA 46 (L) >60 ml/min/1.73m2    Calcium 8.5 8.5 - 10.1 mg/dL    Phosphorus 2.7 2.6 - 4.7 mg/dL    Albumin 2.9 (L) 3.5 - 5.0 g/dL       Visit Vitals  BP (!) 142/88   Pulse 87   Temp 98 °F (36.7 °C)   Resp 18   SpO2 93%   Breastfeeding No       Imaging:  CT HEAD WO CONT    (Results Pending)

## 2020-10-21 NOTE — PROGRESS NOTES
OT eval order received and acknowledged. OT eval attempted at 11:47 am however patient off the floor for EEG. Will continue to follow patient and attempt OT eval at a later time. Thank you.

## 2020-10-21 NOTE — PROGRESS NOTES
Hospitalist Progress Note            Date of Service:  10/21/2020  NAME:  Stephanie Aldrich  :  1950  MRN:  [id number]      Admission Summary:   65-year-old patient admitted for UTI, severe hyponatremia on IV hypotonic solutions with improving sodium level. Also complained of left shoulder hyperemia. COVID-19 result is still pending    Interval history / Subjective:   Patient is more alert  Patient had \"multiple seizures\" yesterday with code purple called. According to nursing staff patient was aware of events during the seizures. She had CT scan of the head done she is responsive though slightly lethargic from a baseline     Assessment & Plan:     Acute severe hyper natremia which is resolved on hypotonic solution  UTI  Encephalopathy metabolic acute  [id number] test resultnegative  DNA OT upon discharge in 1 to 2 days  Code status:   DVT prophylaxis:   Postsurgical hardware in the brain  Seizure  Possible pseudo seizure  Moderate  Malnutrition    Care Plan discussed with: Patient/Family  Anticipated Disposition: SNF/LTC  Anticipated Discharge: 24 hours to 48 hours  to assist with discharge in a.m. Hospital Problems  Never Reviewed          Codes Class Noted POA    Hypernatremia ICD-10-CM: E87.0  ICD-9-CM: 276.0  10/17/2020 Unknown        UTI (urinary tract infection) ICD-10-CM: N39.0  ICD-9-CM: 599.0  10/17/2020 Unknown                Review of Systems:   A comprehensive review of systems was negative except for that written in the HPI. Vital Signs:    Last 24hrs VS reviewed since prior progress note.  Most recent are:  Visit Vitals  BP (!) 142/88   Pulse 87   Temp 98 °F (36.7 °C)   Resp 18   SpO2 93%   Breastfeeding No       No intake or output data in the 24 hours ending 10/21/20 0957     Physical Examination:             Constitutional:  No acute distress, cooperative, pleasant    ENT:  Oral mucosa Subjective:     Chief Complaint   Patient presents with   • Fall     2 falls, one on Dec 18 resulting in 2 staples in head, and then another Jan 8th resulting in contusion on R Flank        Joie Hart is a 71 y.o. female here today for evaluation and management of:    Right flank pain  Patient had another fall on 1/8/29 where her  started to fall and she went to [place], but fell and her flank on the  handle.  She saw Dr. Elizondo earlier this week who ordered xrays that the patient did not do.  She is having right flank pain but no hematuria.  She also had some thoracic back pain and rib cage pain.  She denies any SOB    Chronic headaches  Patient continues to have daily headaches.  She hit her head in December and had a negative CT.  Her headaches started before this and she is scheduled to see the neurologist.       Allergies   Allergen Reactions   • Codeine Rash     Rash all over           Current medicines (including changes today)  Current Outpatient Prescriptions   Medication Sig Dispense Refill   • hydrOXYzine HCl (ATARAX) 25 MG Tab Take 1 Tab by mouth every 24 hours as needed for Itching. 90 Tab 0   • levothyroxine (SYNTHROID) 88 MCG Tab Take 1 Tab by mouth Every morning on an empty stomach. 30 Tab 0   • amitriptyline (ELAVIL) 150 MG Tab TAKE 1 TABLET BY MOUTH AT BEDTIME AS NEEDED 90 Tab 3   • Magnesium 100 MG Cap Take  by mouth.     • Oral Electrolytes (EMERGEN-C ELECTRO MIX PO) Take  by mouth.     • cyanocobalamin (VITAMIN B-12) 500 MCG Tab Take 500 mcg by mouth every day.     • therapeutic multivitamin-minerals (THERAGRAN-M) Tab Take 1 Tab by mouth every day.     • Probiotic Product (PROBIOTIC DAILY PO) Take  by mouth.     • acyclovir (ZOVIRAX) 800 MG Tab Take 1 Tab by mouth 2 times a day. 180 Tab 0   • ACYCLOVIR PO Take  by mouth.     • Omega-3 Fatty Acids (FISH OIL) 1000 MG Cap capsule Take 1,000 mg by mouth 3 times a day, with meals.       No current facility-administered  medications for this visit.        She  has a past medical history of Allergy; Anemia; Anxiety; Cataract; Chronic back pain; Depression (2009); Diverticulitis; Glaucoma; Head injury, closed (3/6/16); HSV infection; IBD (inflammatory bowel disease); Migraine; Pelvic fracture (HCC) (1976); Spondylisthesis; Thyroid disease; and Vaginal vault prolapse (2006).    Patient Active Problem List    Diagnosis Date Noted   • Right flank pain 01/16/2019   • Acute midline low back pain without sciatica 01/11/2019   • Balance problem 01/11/2019   • Chest trauma 01/11/2019   • Rib pain on left side 12/12/2018   • Acute buttock pain 07/26/2018   • Pain of cervical spine 06/26/2018   • Acquired hypothyroidism 06/26/2018   • Chronic diarrhea of unknown origin 06/07/2018   • Hematuria 04/11/2018   • Pain of right upper extremity 03/27/2018   • Chronic pain syndrome 03/27/2018   • Abnormal CT of the abdomen 03/27/2018   • White matter abnormality on MRI of brain 01/09/2018   • Chronic headaches 10/05/2017   • Chronic non-specific white matter lesions on MRI 10/05/2017   • Chronic left-sided low back pain with sciatica 07/25/2017   • Seasonal allergic rhinitis due to pollen 05/26/2017   • B12 deficiency 12/20/2016   • Spondylisthesis    • Acquired cyst of kidney 03/23/2016   • Lymphocytic thyroiditis 03/23/2016   • Herpes simplex virus (HSV) infection 11/07/2012   • Other hyperlipidemia 11/07/2012   • Chronic urticaria 11/07/2012   • Osteopenia 11/06/2012       ROS   No fever or chills.  No nausea or vomiting.  No chest pain or palpitations.  No cough or SOB.  No pain with urination or hematuria.  No black or bloody stools.       Objective:     Blood pressure 112/62, pulse 87, temperature 36.3 °C (97.4 °F), temperature source Temporal, resp. rate 20, height 1.524 m (5'), weight 54.4 kg (120 lb), SpO2 95 %, not currently breastfeeding. Body mass index is 23.44 kg/m².   Physical Exam:  Well developed, well nourished.  Alert, oriented in no  moist, oropharynx benign. Resp:  CTA bilaterally. No wheezing/rhonchi/rales. No accessory muscle use   CV:  Regular rhythm, normal rate, no murmurs, gallops, rubs    GI:  Soft, non distended, non tender. normoactive bowel sounds, no hepatosplenomegaly     Musculoskeletal:  No edema, warm, 2+ pulses throughout    Neurologic:  Moves all extremities. AAOx3, CN II-XII reviewed     Psych:  Good insight, Not anxious nor agitated. Skin:  Good turgor, no rashes or ulcers and rash  Hematologic/Lymphatic/Immunlogic:  No jaundice nor lymph node swelling  Eyes:  EOMI. Anicteric sclerae, PERRL. Data Review:    Review and/or order of clinical lab test      Labs:     No results for input(s): WBC, HGB, HCT, PLT, HGBEXT, HCTEXT, PLTEXT, HGBEXT, HCTEXT, PLTEXT in the last 72 hours. Recent Labs     10/20/20  1345 10/19/20  0705    139   K 4.5 2.8*   * 110*   CO2 18* 22   BUN 11 12   CREA 1.17* 1.04*   GLU 92 191*   CA 8.5 8.2*   MG 1.8  --    PHOS 2.7 1.7*     Recent Labs     10/20/20  1345 10/19/20  0705   ALB 2.9* 2.8*     No results for input(s): INR, PTP, APTT, INREXT, INREXT in the last 72 hours. No results for input(s): FE, TIBC, PSAT, FERR in the last 72 hours. No results found for: FOL, RBCF   No results for input(s): PH, PCO2, PO2 in the last 72 hours. No results for input(s): CPK, CKNDX, TROIQ in the last 72 hours.     No lab exists for component: CPKMB  No results found for: CHOL, CHOLX, CHLST, CHOLV, HDL, HDLP, LDL, LDLC, DLDLP, TGLX, TRIGL, TRIGP, CHHD, CHHDX  Lab Results   Component Value Date/Time    Glucose (POC) 106 (H) 10/20/2020 10:01 AM     Lab Results   Component Value Date/Time    Color Yellow/Straw 10/17/2020 01:42 AM    Appearance Clear 10/17/2020 01:42 AM    Specific gravity 1.027 10/17/2020 01:42 AM    Specific gravity 1.012 11/30/2013 04:50 AM    pH (UA) 5.0 10/17/2020 01:42 AM    Protein Negative 10/17/2020 01:42 AM    Glucose Negative 10/17/2020 01:42 AM    Ketone Negative 10/17/2020 01:42 AM    Bilirubin Negative 10/17/2020 01:42 AM    Urobilinogen 0.1 10/17/2020 01:42 AM    Nitrites Negative 10/17/2020 01:42 AM    Leukocyte Esterase Negative 10/17/2020 01:42 AM    Epithelial cells FEW 11/30/2013 04:50 AM    Bacteria Negative 10/17/2020 01:42 AM    WBC 0-4 10/17/2020 01:42 AM    RBC 0-5 10/17/2020 01:42 AM         Medications Reviewed:     Current Facility-Administered Medications   Medication Dose Route Frequency    levETIRAcetam (KEPPRA) 500 mg in saline (iso-osm) 100 mL IVPB (premix)  500 mg IntraVENous Q12H    LORazepam (ATIVAN) injection 1 mg  1 mg IntraVENous Q6H PRN    0.45% sodium chloride infusion  25 mL/hr IntraVENous CONTINUOUS    phosphorus (K PHOS NEUTRAL) 250 mg tablet 2 Tab  2 Tab Oral BID    mupirocin (BACTROBAN) 2 % ointment   Topical BID    acetaminophen (TYLENOL) tablet 650 mg  650 mg Oral Q6H PRN    cefTRIAXone (ROCEPHIN) 1 g in 0.9% sodium chloride (MBP/ADV) 50 mL MBP  1 g IntraVENous Q24H    sodium chloride (NS) flush 5-40 mL  5-40 mL IntraVENous Q8H    sodium chloride (NS) flush 5-40 mL  5-40 mL IntraVENous PRN    docusate sodium (COLACE) capsule 100 mg  100 mg Oral BID    enoxaparin (LOVENOX) injection 30 mg  30 mg SubCUTAneous Q24H     ______________________________________________________________________  EXPECTED LENGTH OF STAY: - - -  ACTUAL LENGTH OF STAY:          4                 Rlaph Rodriguez MD acute distress.  Eye contact is good, speech goal directed, affect calm  Eyes: conjunctiva non-injected, sclera non-icteric.  PERRL. EOM's intact  Ears: Pinna normal. TM pearly gray.   Nose: Nares are patent.  Normal mucosa  Mouth: Oral mucous membranes pink and moist with no lesions.  Neck Supple.  No adenopathy or masses in the neck or supraclavicular regions. No thyromegaly  Lungs: clear to auscultation bilaterally with good excursion. No wheezes or rhonchi  CV: regular rate and rhythm. No murmur  Abdomen: soft, nontender, no masses or organomegaly.  No rebound or guarding .  Right mild flank tenderness            Assessment and Plan:   The following treatment plan was discussed    1. Right flank pain  Urinalysis is normal.  Advised patient to proceed with x-rays.  Discussed this is most likely a contusion and will continue to improve.  Ice to the area as needed  - POCT Urinalysis    2. Chronic nonintractable headache, unspecified headache type  Stressed the importance of follow-up with the neurologist as scheduled.  Call if symptoms change    Any change or worsening of signs or symptoms, patient encouraged to follow-up or report to the emergency room for further evaluation. Patient understands and agrees.    Followup: Return in about 3 months (around 4/16/2019).

## 2020-10-21 NOTE — ROUTINE PROCESS
Bedside shift change report given to Lucía Pagan (oncoming nurse) by Juancho Bejarano (offgoing nurse). Report included the following information SBAR, Kardex, MAR and Recent Results.

## 2020-10-21 NOTE — ROUTINE PROCESS
PT eval order received and acknowledged. PT eval attempted at 1147 on 10/21/2020 however pt is currently off unit for EEG. Will continue to follow patient and attempt PT eval at a later time. Thank you.

## 2020-10-21 NOTE — CONSULTS
NAME:  Deon Millan   :   1950   MRN:   [id number]     ATTENDING: Adriana England MD  PCP:  Fritz Lee MD    Date/Time:  10/21/2020 10:19 PM      Subjective:   REQUESTING PHYSICIAN: Dr. Steffanie Charlesial:    hypernatremia    Patient seen at bedside, seems to be in no distress  Sodium has now improved    Past medical history: Significant for cerebral aneurysm repair, partial central diabetes insipidus was on DDAVP history of seizures, hyperlipidemia, dementia, ?chronic kidney disease    Past surgical history: Significant for brain aneurysm repair    Social History     Tobacco Use    Smoking status: Not on file   Substance Use Topics    Alcohol use: Not on file      No family history on file. No Known Allergies   Prior to Admission medications    Not on File       REVIEW OF SYSTEMS:     Unable to review as patient is a poor historian         Objective:   VITALS:    Visit Vitals  /84 (BP 1 Location: Right arm, BP Patient Position: At rest)   Pulse 95   Temp 98.1 °F (36.7 °C)   Resp 18   SpO2 95%   Breastfeeding No     Temp (24hrs), Av.1 °F (36.7 °C), Min:98 °F (36.7 °C), Max:98.2 °F (36.8 °C)      PHYSICAL EXAM:   General: awake, no acute distress. AMS+  HEENT: EOMI, no Icterus, no Pallor, pupils reactive, mucosa dry, normal inspection of ears and nose, throat clear. Neck: Neck is supple, No JVD, no thyromegaly,   Lungs: breathsounds normal, no respiratory distress on inspection, no rhonchi, no rales,  CVS: heart sounds normal, regular rate and rhythm, no murmurs, no rubs. GI: soft, nontender, normal BS, no palpable organomegaly,  Extremeties: no clubbing, no cyanosis, no edema,  Neuro: Alert, awake, altered mental status   Probably mental status is at baseline now   skin: normal skin turgor, no skin rashes   Musculoskeletal: no acute joint swellings    LAB DATA REVIEWED:    No results found for this or any previous visit (from the past 24 hour(s)).      1. Hypernatremia: Admitted with altered mental status. Initial sodium was high at 163   etiology could be central diabetes insipidus and poor intake  Sodium had improved to 139 . off hypotonic IV fluids. I discontinued DDAVP and D5W yesterday to avoid overcorrection/hyponatremia . not noted to be polyuric   Labs today are pending   Currently on 25 mils per hour half-normal saline   I will check stat renal panel  We will continue to monitor sodium levels      2. ?? History of chronic kidney disease stage III:   patient had fluctuating Renal functions probably related to superimposed dehydration with diabetes insipidus   Creatinine is improved to 1.1  We will continue to monitor renal functions to assess the baseline     3. Altered mental status:   Patient has history of dementia, status post brain aneurysm resection with some residual neurological changes. Current Mental status changes are probably related to dehydration and hyponatremia ,   improving , mental status is probably around baseline now  Continue to monitor clinically    4. Severe hypokalemia  -K is only 2.8 today. S/p 3 doses of po KCl 40 mEq of oral KCl  Will do stat renal panel now    5. Renal osteodystrophy: Calcium is stable. Phosphorus is 2.7. S/p replace with phosNaK  pending intact PTH, and vitamin D-25 hydroxyl level.        Signed: Ramsey Guy MD

## 2020-10-22 LAB
ALBUMIN SERPL-MCNC: 3 G/DL (ref 3.5–5)
ANION GAP SERPL CALC-SCNC: 10 MMOL/L (ref 5–15)
BUN SERPL-MCNC: 15 MG/DL (ref 6–20)
BUN/CREAT SERPL: 13 (ref 12–20)
CA-I BLD-MCNC: 8.5 MG/DL (ref 8.5–10.1)
CHLORIDE SERPL-SCNC: 111 MMOL/L (ref 97–108)
CO2 SERPL-SCNC: 23 MMOL/L (ref 21–32)
CREAT SERPL-MCNC: 1.12 MG/DL (ref 0.55–1.02)
GLUCOSE BLD STRIP.AUTO-MCNC: 338 MG/DL (ref 65–100)
GLUCOSE SERPL-MCNC: 105 MG/DL (ref 65–100)
PERFORMED BY, TECHID: ABNORMAL
PHOSPHATE SERPL-MCNC: 2.4 MG/DL (ref 2.6–4.7)
POTASSIUM SERPL-SCNC: 3.9 MMOL/L (ref 3.5–5.1)
SODIUM SERPL-SCNC: 144 MMOL/L (ref 136–145)

## 2020-10-22 PROCEDURE — 82962 GLUCOSE BLOOD TEST: CPT

## 2020-10-22 PROCEDURE — 36415 COLL VENOUS BLD VENIPUNCTURE: CPT

## 2020-10-22 PROCEDURE — 97165 OT EVAL LOW COMPLEX 30 MIN: CPT

## 2020-10-22 PROCEDURE — 65270000029 HC RM PRIVATE

## 2020-10-22 PROCEDURE — 97161 PT EVAL LOW COMPLEX 20 MIN: CPT

## 2020-10-22 PROCEDURE — 74011000258 HC RX REV CODE- 258: Performed by: INTERNAL MEDICINE

## 2020-10-22 PROCEDURE — 97530 THERAPEUTIC ACTIVITIES: CPT

## 2020-10-22 PROCEDURE — 74011250637 HC RX REV CODE- 250/637: Performed by: INTERNAL MEDICINE

## 2020-10-22 PROCEDURE — 80069 RENAL FUNCTION PANEL: CPT

## 2020-10-22 PROCEDURE — 74011250636 HC RX REV CODE- 250/636: Performed by: INTERNAL MEDICINE

## 2020-10-22 RX ADMIN — MUPIROCIN: 20 OINTMENT TOPICAL at 21:00

## 2020-10-22 RX ADMIN — ENOXAPARIN SODIUM 30 MG: 40 INJECTION SUBCUTANEOUS at 12:07

## 2020-10-22 RX ADMIN — Medication 10 ML: at 04:43

## 2020-10-22 RX ADMIN — Medication 10 ML: at 12:17

## 2020-10-22 RX ADMIN — MUPIROCIN: 20 OINTMENT TOPICAL at 12:13

## 2020-10-22 RX ADMIN — CEFTRIAXONE SODIUM 1 G: 1 INJECTION, POWDER, FOR SOLUTION INTRAMUSCULAR; INTRAVENOUS at 12:07

## 2020-10-22 RX ADMIN — SODIUM CHLORIDE 25 ML/HR: 4.5 INJECTION, SOLUTION INTRAVENOUS at 04:43

## 2020-10-22 RX ADMIN — LEVETIRACETAM 500 MG: 5 INJECTION INTRAVENOUS at 12:07

## 2020-10-22 NOTE — PROGRESS NOTES
NEURO PROGRESS NOTE        SUBJECTIVE:     Mental status changes, metabolic abnormalities, or other medical problems    EXAM:    Awake responds verbally though slowly. Follows one-step commands with mild coaxing  No spells reported    ASSESSMENT/PLAN:  Overall mental status continues to improve.     ALLERGIES:    No Known Allergies    MEDS:      Current Facility-Administered Medications:     levETIRAcetam (KEPPRA) 500 mg in saline (iso-osm) 100 mL IVPB (premix), 500 mg, IntraVENous, Q12H, Randall Hernandes MD, Stopped at 10/22/20 0000    LORazepam (ATIVAN) injection 1 mg, 1 mg, IntraVENous, Q6H PRN, Melissa SMITH MD, 1 mg at 10/21/20 1738    0.45% sodium chloride infusion, 25 mL/hr, IntraVENous, CONTINUOUS, Randall Hernandes MD, Last Rate: 25 mL/hr at 10/22/20 0443, 25 mL/hr at 10/22/20 0443    mupirocin (BACTROBAN) 2 % ointment, , Topical, BID, Randall Hernandes MD    acetaminophen (TYLENOL) tablet 650 mg, 650 mg, Oral, Q6H PRN, Tyler Alcantar MD    cefTRIAXone (ROCEPHIN) 1 g in 0.9% sodium chloride (MBP/ADV) 50 mL MBP, 1 g, IntraVENous, Q24H, Randall Hernandes MD, Last Rate: 100 mL/hr at 10/21/20 1122, 1 g at 10/21/20 1122    sodium chloride (NS) flush 5-40 mL, 5-40 mL, IntraVENous, Q8H, Randall Hernandes MD, Stopped at 10/22/20 0600    sodium chloride (NS) flush 5-40 mL, 5-40 mL, IntraVENous, PRN, Melissa SMITH MD    docusate sodium (COLACE) capsule 100 mg, 100 mg, Oral, BID, Randall Hernandes MD, 100 mg at 10/20/20 2157    enoxaparin (LOVENOX) injection 30 mg, 30 mg, SubCUTAneous, Q24H, Randall Hernandes MD, 30 mg at 10/21/20 1310    LABS:  Recent Results (from the past 24 hour(s))   GLUCOSE, POC    Collection Time: 10/22/20  4:51 AM   Result Value Ref Range    Glucose (POC) 338 (H) 65 - 100 mg/dL    Performed by Lane Ceron Vitals  /63 (BP 1 Location: Right arm, BP Patient Position: At rest)   Pulse 86   Temp 98 °F (36.7 °C)   Resp 20   SpO2 95%   Breastfeeding No       Imaging:  CT HEAD WO CONT   Final Result   IMPRESSION: Postsurgical change similar compared to CT head April 24, 2020. No   intracranial hemorrhage.

## 2020-10-22 NOTE — PROGRESS NOTES
PHYSICAL THERAPY EVALUATION  Patient: Rayshawn Lamb (71 y.o. female)  Date: 10/22/2020  Primary Diagnosis: Hypernatremia [E87.0]  UTI (urinary tract infection) [N39.0]  Hypernatremia [E87.0]        Precautions: falls, seizure       ASSESSMENT  Based on the objective data described below, the patient presents with generalized weakness, impaired functional mobility, impaired amb, impaired balance, and decreased activity tolerance. Pt A&O to all but month but required frequent reminders that she was not at the facility throughout session. Per pt reports she resides in nursing home and used RW for short distance mobility and WC for longer distance mobility, pt required A for ADLS/IADLS. Pt semi-supine in bed upon PT/OT arrival, agreeable to evaluation. Pt required CGA for bed mobility, CGA supine > sit, CGA with increased time sit <> stand transfers. Pt amb 20 feet with gt belt, RW, and CGA; demonstrating forward flexion of head and shoulders with increased trunk flexion, slow, shuffling, step to gt pattern with no LOB or knee buckling noted. Pt did fair with session today with several \"seizure like\" occurences during bed mobility and amb where pt would not respond verbally, eyes would flutter but no LOB or trunk weakness noted when episodes would occur during standing. Pt will benefit from continued skilled PT to address above deficits and return to PLOF. Current PT DC recommendation SNF.      Current Level of Function Impacting Discharge (mobility/balance): increased supervision and assistance required due to seizure like episodes that appear to occur randomly during mobility     Other factors to consider for discharge: h/o aneurysm repair        PLAN :  Recommendations and Planned Interventions: bed mobility training, transfer training, gait training, therapeutic exercises, patient and family training/education, and therapeutic activities      Frequency/Duration: Patient will be followed by physical therapy:  3 times a week to address goals. Recommendation for discharge: (in order for the patient to meet his/her long term goals)  SNF    This discharge recommendation:  Has been made in collaboration with the attending provider and/or case management    IF patient discharges home will need the following DME: none         SUBJECTIVE:   Patient stated i'd like to get up to the chair and eat.     OBJECTIVE DATA SUMMARY:   HISTORY:    No past medical history on file. No past surgical history on file. Personal factors and/or comorbidities impacting plan of care: poor short term memory recall, and seizure like activity with mobility; required max verbal cues for safety     Home Situation  Home Environment: [address] [place] [address] Name: [facility].  One/Two Story Residence: One story  Living Alone: No  Support Systems: Skilled nursing facility  Patient Expects to be Discharged to[de-identified] Skilled nursing facility  Current DME Used/Available at Home: lori Moreno    EXAMINATION/PRESENTATION/DECISION MAKING:   Critical Behavior:  Neurologic State: Alert, Confused  Orientation Level: Oriented to person, Oriented to place, Disoriented to time(Able to state year but not month)  Cognition: Follows commands, Decreased attention/concentration, Poor safety awareness  Safety/Judgement: Decreased insight into deficits, Decreased awareness of need for safety  Hearing: Auditory  Auditory Impairment: None  Skin:  intact where visible   Edema: none noted  Range Of Motion:  AROM: Generally decreased, functional       Strength:    Strength: Generally decreased, functional(grossly 3/5)      Functional Mobility:  Bed Mobility:  Rolling: Contact guard assistance  Supine to Sit: Contact guard assistance     Scooting: Contact guard assistance  Transfers:  Sit to Stand: Contact guard assistance  Stand to Sit: Contact guard assistance        Bed to Chair: Contact guard assistance              Balance:   Sitting: Intact; Without support  Standing: Intact; With support(used RW)  Ambulation/Gait Training:  Distance (ft): 20 Feet (ft)(bed>bathroom>bedside chair)  Assistive Device: Gait belt;Walker, rolling  Ambulation - Level of Assistance: Contact guard assistance     Gait Description (WDL): Exceptions to WDL  Gait Abnormalities: Step to gait; Shuffling gait        Base of Support: Narrowed     Speed/Desiree: Shuffled; Slow        Therapeutic Exercises:   Not completed this session    Functional Measure:  [address] [place] [address] AM-PAC 6 Clicks         Basic Mobility Inpatient Short Form  How much difficulty does the patient currently have. .. Unable A Lot A Little None   1. Turning over in bed (including adjusting bedclothes, sheets and blankets)? [] 1   [] 2   [x] 3   [] 4   2. Sitting down on and standing up from a chair with arms ( e.g., wheelchair, bedside commode, etc.)   [] 1   [] 2   [x] 3   [] 4   3. Moving from lying on back to sitting on the side of the bed? [] 1   [] 2   [x] 3   [] 4          How much help from another person does the patient currently need. .. Total A Lot A Little None   4. Moving to and from a bed to a chair (including a wheelchair)? [] 1   [] 2   [x] 3   [] 4   5. Need to walk in hospital room? [] 1   [] 2   [x] 3   [] 4   6. Climbing 3-5 steps with a railing? [] 1   [x] 2   [] 3   [] 4   © 2007, Trustees of [address] [place] [address], under license to [employer]. All rights reserved     Score:  Initial: 17/24 Most Recent: X (Date: 10/22/2020)   Interpretation of Tool:  Represents activities that are increasingly more difficult (i.e. Bed mobility, Transfers, Gait).   Score 24 23 22-20 19-15 14-10 9-7 6   Modifier CH CI CJ CK CL CM CN         Physical Therapy Evaluation Charge Determination   History Examination Presentation Decision-Making   HIGH Complexity :3+ comorbidities / personal factors will impact the outcome/ POC  HIGH Complexity : 4+ Standardized tests and measures addressing body structure, function, activity limitation and / or participation in recreation  LOW Complexity : Stable, uncomplicated  Other outcome measures ampac 6  mod      Based on the above components, the patient evaluation is determined to be of the following complexity level: LOW     Pain Ratin/10 bilateral thighs    Activity Tolerance:   Good    After treatment patient left in no apparent distress:   Sitting in chair, Call bell within reach, Bed / chair alarm activated, and unit secretary and nsg updated     GOALS:    Problem: Mobility Impaired (Adult and Pediatric)  Goal: *Acute Goals and Plan of Care (Insert Text)  Description: Pt will be I with LE HEP in 7 days. Pt will perform bed mobility with mod I in 7 days. Pt will perform transfers with mod I in 7 days. Pt will amb  feet with LRAD safely with mod I in 7 days. Outcome: Not Met    COMMUNICATION/EDUCATION:   The patients plan of care was discussed with: Registered nurse, Certified nursing assistant/patient care technician, and unit secretary . Fall prevention education was provided and the patient/caregiver indicated understanding., Patient/family have participated as able in goal setting and plan of care. , and Patient/family agree to work toward stated goals and plan of care. PT/OT sessions occurred together for increased safety of pt and clinician due to random occurring seizure like episodes noted from previous PT/OT attempt.     Thank you for this referral.  Lisa Anderson, PT, DPT   Time Calculation: 34 mins

## 2020-10-22 NOTE — PROGRESS NOTES
Hospitalist Progress Note            Date of Service:  10/22/2020  NAME:  Emily Rees  :  1950  MRN:  [id number]      Admission Summary:   48-year-old patient admitted for UTI, severe hyponatremia on IV hypotonic solutions with improving sodium level. Also complained of left shoulder hyperemia. COVID-19 result is still pending    Interval history / Subjective:   Patient is more alert  The request for Test by nephrology. For possible discharge in am     Assessment & Plan:     Acute severe hyper natremia which is resolved on hypotonic solution  UTI  Encephalopathy metabolic acute  [id number] test resultnegative  DNA OT upon discharge in 1 to 2 days  Code status:   DVT prophylaxis:   Postsurgical hardware in the brain  Seizure  Possible pseudo seizure  Moderate  Malnutrition  Nephrology request for more tests. Possible discharge in a.m.  care Plan discussed with: Patient/Family  Anticipated Disposition: SNF/LTC  Anticipated Discharge: 24 hours to 48 hours  to assist with discharge in a.m. Hospital Problems  Never Reviewed          Codes Class Noted POA    Hypernatremia ICD-10-CM: E87.0  ICD-9-CM: 276.0  10/17/2020 Unknown        UTI (urinary tract infection) ICD-10-CM: N39.0  ICD-9-CM: 599.0  10/17/2020 Unknown                Review of Systems:   A comprehensive review of systems was negative except for that written in the HPI. Vital Signs:    Last 24hrs VS reviewed since prior progress note. Most recent are:  Visit Vitals  /68   Pulse 84   Temp 97.6 °F (36.4 °C)   Resp 20   SpO2 96%   Breastfeeding No       No intake or output data in the 24 hours ending 10/22/20 9374     Physical Examination:             Constitutional:  No acute distress, cooperative, pleasant    ENT:  Oral mucosa moist, oropharynx benign. Resp:  CTA bilaterally. No wheezing/rhonchi/rales.  No accessory muscle use   CV: Regular rhythm, normal rate, no murmurs, gallops, rubs    GI:  Soft, non distended, non tender. normoactive bowel sounds, no hepatosplenomegaly     Musculoskeletal:  No edema, warm, 2+ pulses throughout    Neurologic:  Moves all extremities. AAOx3, CN II-XII reviewed     Psych:  Good insight, Not anxious nor agitated. Skin:  Good turgor, no rashes or ulcers and rash  Hematologic/Lymphatic/Immunlogic:  No jaundice nor lymph node swelling  Eyes:  EOMI. Anicteric sclerae, PERRL. Data Review:    Review and/or order of clinical lab test      Labs:     No results for input(s): WBC, HGB, HCT, PLT, HGBEXT, HCTEXT, PLTEXT, HGBEXT, HCTEXT, PLTEXT in the last 72 hours. Recent Labs     10/22/20  1011 10/20/20  1345    136   K 3.9 4.5   * 109*   CO2 23 18*   BUN 15 11   CREA 1.12* 1.17*   * 92   CA 8.5 8.5   MG  --  1.8   PHOS 2.4* 2.7     Recent Labs     10/22/20  1011 10/20/20  1345   ALB 3.0* 2.9*     No results for input(s): INR, PTP, APTT, INREXT, INREXT in the last 72 hours. No results for input(s): FE, TIBC, PSAT, FERR in the last 72 hours. No results found for: FOL, RBCF   No results for input(s): PH, PCO2, PO2 in the last 72 hours. No results for input(s): CPK, CKNDX, TROIQ in the last 72 hours.     No lab exists for component: CPKMB  No results found for: CHOL, CHOLX, CHLST, CHOLV, HDL, HDLP, LDL, LDLC, DLDLP, TGLX, TRIGL, TRIGP, CHHD, CHHDX  Lab Results   Component Value Date/Time    Glucose (POC) 338 (H) 10/22/2020 04:51 AM    Glucose (POC) 106 (H) 10/20/2020 10:01 AM     Lab Results   Component Value Date/Time    Color Yellow/Straw 10/17/2020 01:42 AM    Appearance Clear 10/17/2020 01:42 AM    Specific gravity 1.027 10/17/2020 01:42 AM    Specific gravity 1.012 11/30/2013 04:50 AM    pH (UA) 5.0 10/17/2020 01:42 AM    Protein Negative 10/17/2020 01:42 AM    Glucose Negative 10/17/2020 01:42 AM    Ketone Negative 10/17/2020 01:42 AM    Bilirubin Negative 10/17/2020 01:42 AM Urobilinogen 0.1 10/17/2020 01:42 AM    Nitrites Negative 10/17/2020 01:42 AM    Leukocyte Esterase Negative 10/17/2020 01:42 AM    Epithelial cells FEW 11/30/2013 04:50 AM    Bacteria Negative 10/17/2020 01:42 AM    WBC 0-4 10/17/2020 01:42 AM    RBC 0-5 10/17/2020 01:42 AM         Medications Reviewed:     Current Facility-Administered Medications   Medication Dose Route Frequency    levETIRAcetam (KEPPRA) 500 mg in saline (iso-osm) 100 mL IVPB (premix)  500 mg IntraVENous Q12H    LORazepam (ATIVAN) injection 1 mg  1 mg IntraVENous Q6H PRN    0.45% sodium chloride infusion  25 mL/hr IntraVENous CONTINUOUS    mupirocin (BACTROBAN) 2 % ointment   Topical BID    acetaminophen (TYLENOL) tablet 650 mg  650 mg Oral Q6H PRN    cefTRIAXone (ROCEPHIN) 1 g in 0.9% sodium chloride (MBP/ADV) 50 mL MBP  1 g IntraVENous Q24H    sodium chloride (NS) flush 5-40 mL  5-40 mL IntraVENous Q8H    sodium chloride (NS) flush 5-40 mL  5-40 mL IntraVENous PRN    docusate sodium (COLACE) capsule 100 mg  100 mg Oral BID    enoxaparin (LOVENOX) injection 30 mg  30 mg SubCUTAneous Q24H     ______________________________________________________________________  EXPECTED LENGTH OF STAY: 3d 7h  ACTUAL LENGTH OF STAY:          5                 Amador Ferreira MD

## 2020-10-22 NOTE — CONSULTS
NAME:  Dion Green   :   1950   MRN:   [id number]     ATTENDING: Tyson Gallegos MD  PCP:  Jim Jung MD    Date/Time:  10/22/2020 10:19 PM      Subjective:   REQUESTING PHYSICIAN: Dr. Angelito White:    hypernatremia    Patient seen at bedside, seems to be in no distress  Sodium has now improved  Only on 1/2 NS at 25 ml/hr        Social History     Tobacco Use    Smoking status: Not on file   Substance Use Topics    Alcohol use: Not on file      No family history on file. No Known Allergies   Prior to Admission medications    Not on File       REVIEW OF SYSTEMS:     Unable to review as patient is a poor historian         Objective:   VITALS:    Visit Vitals  /68   Pulse 84   Temp 97.6 °F (36.4 °C)   Resp 20   SpO2 96%   Breastfeeding No     Temp (24hrs), Av.7 °F (36.5 °C), Min:97.4 °F (36.3 °C), Max:98 °F (36.7 °C)      PHYSICAL EXAM:   General: awake, no acute distress. AMS+  HEENT: EOMI, no Icterus, no Pallor, pupils reactive, mucosa dry, normal inspection of ears and nose, throat clear. Neck: Neck is supple, No JVD, no thyromegaly,   Lungs: breathsounds normal, no respiratory distress on inspection, no rhonchi, no rales,  CVS: heart sounds normal, regular rate and rhythm, no murmurs, no rubs.   GI: soft, nontender, normal BS, no palpable organomegaly,  Extremeties: no clubbing, no cyanosis, no edema,  Neuro: Alert, awake, altered mental status   Probably mental status is at baseline now   skin: normal skin turgor, no skin rashes   Musculoskeletal: no acute joint swellings    LAB DATA REVIEWED:    Recent Results (from the past 24 hour(s))   GLUCOSE, POC    Collection Time: 10/22/20  4:51 AM   Result Value Ref Range    Glucose (POC) 338 (H) 65 - 100 mg/dL    Performed by Luz Payan    RENAL FUNCTION PANEL    Collection Time: 10/22/20 10:11 AM   Result Value Ref Range    Sodium 144 136 - 145 mmol/L    Potassium 3.9 3.5 - 5.1 mmol/L    Chloride 111 (H) 97 - 108 mmol/L    CO2 23 21 - 32 mmol/L    Anion gap 10 5 - 15 mmol/L    Glucose 105 (H) 65 - 100 mg/dL    BUN 15 6 - 20 mg/dL    Creatinine 1.12 (H) 0.55 - 1.02 mg/dL    BUN/Creatinine ratio 13 12 - 20      GFR est AA 58 (L) >60 ml/min/1.73m2    GFR est non-AA 48 (L) >60 ml/min/1.73m2    Calcium 8.5 8.5 - 10.1 mg/dL    Phosphorus 2.4 (L) 2.6 - 4.7 mg/dL    Albumin 3.0 (L) 3.5 - 5.0 g/dL        1. Hypernatremia: Admitted with altered mental status. Initial sodium was high at 163   etiology was likely poor intake . Doubt central diabetes insipidus  Sodium had improved to 143 . I discontinued DDAVP and D5W  to avoid overcorrection/hyponatremia . not noted to be polyuric . Continue to holdg   Currently on 25 mils per hour half-normal saline   I will check  renal panel in am  We will continue to monitor sodium levels      2. ?? History of chronic kidney disease stage III:   patient had fluctuating Renal functions probably related to superimposed dehydration with diabetes insipidus   Creatinine is improved to 1.1  We will continue to monitor renal functions to assess the baseline     3. Altered mental status:   Patient has history of dementia, status post brain aneurysm resection with some residual neurological changes. Current Mental status changes are probably related to dehydration and hyponatremia ,   improving , mental status is probably around baseline now  Continue to monitor clinically    4. Severe hypokalemia  -K is only 2.8 today. S/p 3 doses of po KCl 40 mEq of oral KCl  Will do stat renal panel now    5. Renal osteodystrophy: Calcium is stable. Phosphorus is 2.4. S/p replace with phosNaK  pending intact PTH, and vitamin D-25 hydroxyl level.        Signed: Fouzia Dooley MD

## 2020-10-22 NOTE — PROGRESS NOTES
OCCUPATIONAL THERAPY EVALUATION  Patient: Junior Hernandez (18 y.o. female)  Date: 10/22/2020  Primary Diagnosis: Hypernatremia [E87.0]  UTI (urinary tract infection) [N39.0]  Hypernatremia [E87.0]        Precautions: Fall       ASSESSMENT  Based on the objective data described below, the patient presents with deficits in generalized strength, functional activity tolerance, static/dynamic standing balance, pain, and cognition/poor safety awareness impacting overall performance of ADLs and functional transfers/mobility. Pt AXO to person and place, able to state year but not month, reports being IND with ADLs and Mod I using a RW  and w/c at [employer], however unclear of reliability of information at this time 2' to pt's confusion. Pt currently requires CGA for bed mobility and functional transfers to/from EOB, chair and toilet using RW. Continued CGA required during toileting routine for balance/safety. UB dressing completed with mod A to guide gown through sleeves and pt setup assist for meals and basic grooming in sitting. Per neurology notes, \"Pt has hx of dementia, s/p brain aneurysm resection with some residual neurological changes. Current mental status changes are probably related to dehydration and hyponatremia\" Pt observed to have several episodes of seizure-like activity with mild shakiness and eyes fluttering. Pt slow to respond, however aware of situation and no LOB observed during occurances in both sitting/standing (discussed with RN). Pt would benefit from continued skilled OT services during hospital stay and at next level of care to address current impairment and improve overall IND and safety with ADLs and functional mobility upon d/c. Other factors to consider for discharge: Family support, DME, impaired cognition     Patient will benefit from skilled therapy intervention to address the above noted impairments.        PLAN :  Recommendations and Planned Interventions: self care training, functional mobility training, therapeutic exercise, balance training, therapeutic activities, cognitive retraining, endurance activities, and patient education    Frequency/Duration: Patient will be followed by occupational therapy 3 times a week to address goals. Recommendation for discharge: (in order for the patient to meet his/her long term goals)  SNF    This discharge recommendation:  Has been made in collaboration with the attending provider and/or case management       SUBJECTIVE:   Patient stated I can do my own self cares     OBJECTIVE DATA SUMMARY:   HISTORY:   No past medical history on file. No past surgical history on file. Expanded or extensive additional review of patient history:     Home Situation  Home Environment: [address] [place] [address] Name: [facility].  One/Two Story Residence: One story  Living Alone: No  Support Systems: Skilled nursing facility  Patient Expects to be Discharged to[de-identified] Skilled nursing facility  Current DME Used/Available at Home: [employer]    EXAMINATION OF PERFORMANCE DEFICITS:  Cognitive/Behavioral Status:  Neurologic State: Alert;Confused  Orientation Level: Oriented to person;Oriented to place; Disoriented to time(Able to state year but not month)  Cognition: Follows commands;Decreased attention/concentration;Poor safety awareness        Safety/Judgement: Decreased insight into deficits; Decreased awareness of need for safety    Hearing: Auditory  Auditory Impairment: None    Vision/Perceptual:       WFL    Range of Motion:  AROM: Generally decreased, functional     Strength:  Strength: Generally decreased, functional(grossly 3/5)       Coordination:     Fine Motor Skills-Upper: Left Intact; Right Intact    Gross Motor Skills-Upper: Left Intact; Right Intact    Tone & Sensation:  WFL     Balance:  Sitting: Intact; Without support  Standing: Intact; With support(used RW)    Functional Mobility and Transfers for ADLs:  Bed Mobility:  Rolling: Contact guard assistance  Supine to Sit: Contact guard assistance  Scooting: Contact guard assistance    Transfers:  Sit to Stand: Contact guard assistance  Stand to Sit: Contact guard assistance  Bed to Chair: Contact guard assistance  Bathroom Mobility: Contact guard assistance  Toilet Transfer : Contact guard assistance    ADL Intervention and task modifications:  Feeding  Feeding Assistance: Set-up    Grooming  Grooming Assistance: Set-up; Supervision  Position Performed: Other (comment)(Seated on commode)  Washing Face: Set-up; Supervision    Upper [address]: Moderate assistance    Toileting  Toileting Assistance: Contact guard assistance  Bowel Hygiene: Supervision;Set-up    Cognitive Retraining  Following Commands: Follows one step commands/directions  Safety/Judgement: Decreased insight into deficits; Decreased awareness of need for safety    Therapeutic Exercise:  Pt would benefit from UE HEP to improve overall UE AROM/strength and can be further educated in next treatment session. Functional Measure:     [employer] AM-PACTM \"6 Clicks\"                                                       Daily Activity Inpatient Short Form  How much help from another person does the patient currently need. .. Total; A Lot A Little None   1. Putting on and taking off regular lower body clothing? []  1 []  2 [x]  3 []  4   2. Bathing (including washing, rinsing, drying)? []  1 []  2 [x]  3 []  4   3. Toileting, which includes using toilet, bedpan or urinal? [] 1 []  2 [x]  3 []  4   4. Putting on and taking off regular upper body clothing? []  1 []  2 [x]  3 []  4   5. Taking care of personal grooming such as brushing teeth? []  1 []  2 [x]  3 []  4   6. Eating meals? []  1 []  2 [x]  3 []  4   © 2007, Trustees of [facility] [employer], under license to [employer].  All rights reserved     Score: 18/24     Interpretation of Tool:  Represents clinically-significant functional categories (i.e. Activities of daily living). Percentage of Impairment CH    0%   CI    1-19% CJ    20-39% CK    40-59% CL    60-79% CM    80-99% CN     100%   [facility]  Score 6-24 24 23 20-22 15-19 10-14 7-9 6       Occupational Therapy Evaluation Charge Determination   History Examination Decision-Making   MEDIUM Complexity : Expanded review of history including physical, cognitive and psychosocial  history  LOW Complexity : 1-3 performance deficits relating to physical, cognitive , or psychosocial skils that result in activity limitations and / or participation restrictions  MEDIUM Complexity : Patient may present with comorbidities that affect occupational performnce. Miniml to moderate modification of tasks or assistance (eg, physical or verbal ) with assesment(s) is necessary to enable patient to complete evaluation       Based on the above components, the patient evaluation is determined to be of the following complexity level: LOW   Pain Ratin/10 bilateral thighs    Activity Tolerance:   Good    After treatment patient left in no apparent distress:    Sitting in chair, Call bell within reach and Bed / chair alarm activated, nursing updated    COMMUNICATION/EDUCATION:   The patients plan of care was discussed with: Physical therapist and Registered nurse    Patient/family agree to work toward stated goals and plan of care. This patients plan of care is appropriate for delegation to [facility]. OT/PT sessions occurred together for increased patient and clinician safety. Thank you for this referral.  Elmo Moncada  Time Calculation: 43 mins    Problem: Self Care Deficits Care Plan (Adult)  Goal: *Acute Goals and Plan of Care (Insert Text)  Description: Pt will be Mod I sup <> sit in prep for EOB ADLs  Pt will be SBA grooming standing sink side LRAD.   Pt will be SBA LE dressing sitting EOB/long sit  Pt will be SBA sit <>  prep for toileting LRAD  Pt will be SBA toileting/toilet transfer/cloth mgmt LRAD  Pt will be IND following UE HEP in prep for self care tasks     Outcome: Not Met

## 2020-10-23 LAB
ALBUMIN SERPL-MCNC: 3.2 G/DL (ref 3.5–5)
ANION GAP SERPL CALC-SCNC: 5 MMOL/L (ref 5–15)
BASOPHILS # BLD: 0 K/UL (ref 0–0.1)
BASOPHILS NFR BLD: 0 % (ref 0–1)
BUN SERPL-MCNC: 12 MG/DL (ref 6–20)
BUN/CREAT SERPL: 12 (ref 12–20)
CA-I BLD-MCNC: 8.8 MG/DL (ref 8.5–10.1)
CHLORIDE SERPL-SCNC: 115 MMOL/L (ref 97–108)
CO2 SERPL-SCNC: 26 MMOL/L (ref 21–32)
CREAT SERPL-MCNC: 1 MG/DL (ref 0.55–1.02)
DIFFERENTIAL METHOD BLD: ABNORMAL
EOSINOPHIL # BLD: 0.1 K/UL (ref 0–0.4)
EOSINOPHIL NFR BLD: 2 % (ref 0–7)
ERYTHROCYTE [DISTWIDTH] IN BLOOD BY AUTOMATED COUNT: 22.4 % (ref 11.5–14.5)
GLUCOSE BLD STRIP.AUTO-MCNC: 141 MG/DL (ref 65–100)
GLUCOSE SERPL-MCNC: 85 MG/DL (ref 65–100)
HCT VFR BLD AUTO: 47.3 % (ref 35–47)
HGB BLD-MCNC: 15.2 G/DL (ref 11.5–16)
IMM GRANULOCYTES # BLD AUTO: 0 K/UL (ref 0–0.04)
IMM GRANULOCYTES NFR BLD AUTO: 0 % (ref 0–0.5)
LYMPHOCYTES # BLD: 2.2 K/UL (ref 0.8–3.5)
LYMPHOCYTES NFR BLD: 56 % (ref 12–49)
MCH RBC QN AUTO: 29.1 PG (ref 26–34)
MCHC RBC AUTO-ENTMCNC: 32.1 G/DL (ref 30–36.5)
MCV RBC AUTO: 90.6 FL (ref 80–99)
MONOCYTES # BLD: 0.5 K/UL (ref 0–1)
MONOCYTES NFR BLD: 11 % (ref 5–13)
NEUTS SEG # BLD: 1.3 K/UL (ref 1.8–8)
NEUTS SEG NFR BLD: 31 % (ref 32–75)
PERFORMED BY, TECHID: ABNORMAL
PHOSPHATE SERPL-MCNC: 2 MG/DL (ref 2.6–4.7)
PLATELET # BLD AUTO: 136 K/UL (ref 150–400)
POTASSIUM SERPL-SCNC: 3.5 MMOL/L (ref 3.5–5.1)
RBC # BLD AUTO: 5.22 M/UL (ref 3.8–5.2)
SODIUM SERPL-SCNC: 146 MMOL/L (ref 136–145)
WBC # BLD AUTO: 4 K/UL (ref 3.6–11)

## 2020-10-23 PROCEDURE — 82962 GLUCOSE BLOOD TEST: CPT

## 2020-10-23 PROCEDURE — 97530 THERAPEUTIC ACTIVITIES: CPT

## 2020-10-23 PROCEDURE — 97110 THERAPEUTIC EXERCISES: CPT

## 2020-10-23 PROCEDURE — 80069 RENAL FUNCTION PANEL: CPT

## 2020-10-23 PROCEDURE — 74011250636 HC RX REV CODE- 250/636: Performed by: INTERNAL MEDICINE

## 2020-10-23 PROCEDURE — 36415 COLL VENOUS BLD VENIPUNCTURE: CPT

## 2020-10-23 PROCEDURE — 74011000258 HC RX REV CODE- 258: Performed by: INTERNAL MEDICINE

## 2020-10-23 PROCEDURE — 74011250637 HC RX REV CODE- 250/637: Performed by: INTERNAL MEDICINE

## 2020-10-23 PROCEDURE — 97116 GAIT TRAINING THERAPY: CPT

## 2020-10-23 PROCEDURE — 85025 COMPLETE CBC W/AUTO DIFF WBC: CPT

## 2020-10-23 PROCEDURE — 65270000029 HC RM PRIVATE

## 2020-10-23 RX ORDER — DESMOPRESSIN ACETATE 0.1 MG/1
0.1 TABLET ORAL 2 TIMES DAILY
COMMUNITY
End: 2020-10-28

## 2020-10-23 RX ORDER — DOCUSATE SODIUM 100 MG/1
100 CAPSULE, LIQUID FILLED ORAL 2 TIMES DAILY
COMMUNITY
End: 2020-10-28

## 2020-10-23 RX ORDER — DOCUSATE SODIUM 100 MG/1
100 CAPSULE, LIQUID FILLED ORAL 2 TIMES DAILY
Qty: 60 CAP | Refills: 2 | Status: SHIPPED | OUTPATIENT
Start: 2020-10-23 | End: 2021-01-21

## 2020-10-23 RX ORDER — CALCIUM CARBONATE 500(1250)
2 TABLET ORAL
COMMUNITY

## 2020-10-23 RX ORDER — SAME BUTANEDISULFONATE/BETAINE 400-600 MG
250 POWDER IN PACKET (EA) ORAL 2 TIMES DAILY
COMMUNITY
End: 2021-04-23

## 2020-10-23 RX ORDER — LEVETIRACETAM 100 MG/ML
10 SOLUTION ORAL 2 TIMES DAILY
COMMUNITY
End: 2020-10-28

## 2020-10-23 RX ORDER — FERROUS SULFATE 300 MG/5ML
1 LIQUID (ML) ORAL 3 TIMES DAILY
COMMUNITY

## 2020-10-23 RX ORDER — LAMOTRIGINE 100 MG/1
100 TABLET ORAL 2 TIMES DAILY
COMMUNITY

## 2020-10-23 RX ORDER — DEXTROMETHORPHAN HYDROBROMIDE, GUAIFENESIN 5; 100 MG/5ML; MG/5ML
650 LIQUID ORAL
COMMUNITY
End: 2020-10-28

## 2020-10-23 RX ORDER — LEVETIRACETAM 500 MG/1
500 TABLET ORAL 2 TIMES DAILY
Qty: 60 TAB | Refills: 0 | Status: SHIPPED | OUTPATIENT
Start: 2020-10-23 | End: 2021-04-23

## 2020-10-23 RX ORDER — CEFUROXIME AXETIL 500 MG/1
500 TABLET ORAL 2 TIMES DAILY
Qty: 14 TAB | Refills: 0 | Status: SHIPPED | OUTPATIENT
Start: 2020-10-23 | End: 2020-10-30

## 2020-10-23 RX ADMIN — CEFTRIAXONE SODIUM 1 G: 1 INJECTION, POWDER, FOR SOLUTION INTRAMUSCULAR; INTRAVENOUS at 19:22

## 2020-10-23 RX ADMIN — MUPIROCIN: 20 OINTMENT TOPICAL at 19:22

## 2020-10-23 RX ADMIN — DOCUSATE SODIUM 100 MG: 100 CAPSULE, LIQUID FILLED ORAL at 23:09

## 2020-10-23 RX ADMIN — LEVETIRACETAM 500 MG: 5 INJECTION INTRAVENOUS at 23:09

## 2020-10-23 RX ADMIN — LEVETIRACETAM 500 MG: 5 INJECTION INTRAVENOUS at 00:30

## 2020-10-23 RX ADMIN — Medication 10 ML: at 14:00

## 2020-10-23 RX ADMIN — LEVETIRACETAM 500 MG: 5 INJECTION INTRAVENOUS at 14:59

## 2020-10-23 RX ADMIN — LORAZEPAM 1 MG: 2 INJECTION, SOLUTION INTRAMUSCULAR; INTRAVENOUS at 07:29

## 2020-10-23 NOTE — PROGRESS NOTES
PHYSICAL THERAPY TREATMENT  Patient: Nic Gabriel (65 y.o. female)  Date: 10/23/2020  Diagnosis: Hypernatremia [E87.0]  UTI (urinary tract infection) [N39.0]  Hypernatremia [E87.0]   <principal problem not specified>       Precautions:    Chart, physical therapy assessment, plan of care and goals were reviewed. ASSESSMENT  Patient continues with skilled PT services and is progressing towards goals. Pt semi supine in bed upon arrival and agreeable. Pt requires contact guard for all bed mobility and transfers and increased time to perform. Pt had seizure like activity during ambulation and therex; facial twitching and not verbally responding. Pt amb with RW and CGAx1, no LOB or knee buckling noted. Post session RN notified of patient status. Other factors to consider for discharge: decreased activity tolerance         PLAN :  Patient continues to benefit from skilled intervention to address the above impairments. Continue treatment per established plan of care. to address goals. Recommendation for discharge: (in order for the patient to meet his/her long term goals)  Therapy up to 5 days/week in SNF setting    This discharge recommendation:  Has been made in collaboration with the attending provider and/or case management    IF patient discharges home will need the following DME: rolling walker       SUBJECTIVE:   Patient stated Concetta Garcia will do whatever you want sugar.     OBJECTIVE DATA SUMMARY:   Critical Behavior:  Neurologic State: Alert  Orientation Level: Oriented X4  Cognition: Memory loss  Safety/Judgement: Decreased insight into deficits, Decreased awareness of need for safety  Functional Mobility Training:  Bed Mobility:  Rolling: Contact guard assistance  Supine to Sit: Contact guard assistance     Scooting: Contact guard assistance        Transfers:  Sit to Stand: Contact guard assistance  Stand to Sit: Contact guard assistance        Bed to Chair: Contact guard assistance Balance:  Sitting: Intact; Without support  Standing: Intact; With support  Ambulation/Gait Training:  Distance (ft): 15 Feet (ft)  Assistive Device: Gait belt;Walker, rolling  Ambulation - Level of Assistance: Contact guard assistance  Gait Abnormalities: Shuffling gait  Base of Support: Narrowed  Speed/Desiree: Shuffled; Slow      Therapeutic Exercises:   1 x 15 AP  1 x 15 LAQ  1 x 15 Marches  1 x 15 Hip ab/ad    Pain Rating:  Reports no pain    Activity Tolerance:   Fair and requires rest breaks  Please refer to the flowsheet for vital signs taken during this treatment. After treatment patient left in no apparent distress:   Supine in bed, Call bell within reach, and Bed / chair alarm activated    COMMUNICATION/COLLABORATION:   The patients plan of care was discussed with: Physical therapist and Registered nurse.      Suad Powell PTA   Time Calculation: 31 mins

## 2020-10-23 NOTE — PROGRESS NOTES
Cm met with pt at the bedside. Pt is confused. Cm called pt's VIRGINIA Wheeler  [phone]. Mr. Shamir Navarro is the spouse of the pt. Pt is a long term resident of [address]. Pt has been at the facility for about 5-6 years. Mr. Shamir Navarro confirmed pt will return to [address]. Mr. Shamir Navarro indicated he was not aware his wife was in the hospital. Cm explained to him we are anticipaitng discharge for today, however he will be contacted if his wife does discharge. Cm discussed IMM. IMM obtained. Referral made via [place] [id number]. Pt's discharge is pending Neuro and Nephro clearance. If pt discharges today, she will return to [address] [facility]. room# 206K and report to call: [phone]. Please call the  - Alfred Presume if the pt discharges today.

## 2020-10-23 NOTE — CONSULTS
Gastroenterology Consult     Referring Physician: Paolo Reed MD     Consult Date: 10/23/2020     Subjective:     Chief Complaint: Seizure disorder. History of Present Illness: Joanna Molina is a 69 y.o. 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 Patient is not a very good historian most of the historical data was gathered from patient's chart that was reviewed. Over the past 24 hours she has couple of bowel movements with blood around the stool. Patient says that she might have hemorrhoids however the history is not very reliable her last hemoglobin was 13.8 current hemoglobin is pending. Filiberto Priest No past medical history on file. No past surgical history on file. No family history on file.   Social History     Tobacco Use    Smoking status: Not on file   Substance Use Topics    Alcohol use: Not on file      No Known Allergies  Current Facility-Administered Medications   Medication Dose Route Frequency    levETIRAcetam (KEPPRA) 500 mg in saline (iso-osm) 100 mL IVPB (premix)  500 mg IntraVENous Q12H    LORazepam (ATIVAN) injection 1 mg  1 mg IntraVENous Q6H PRN    0.45% sodium chloride infusion  25 mL/hr IntraVENous CONTINUOUS    mupirocin (BACTROBAN) 2 % ointment   Topical BID    acetaminophen (TYLENOL) tablet 650 mg  650 mg Oral Q6H PRN    cefTRIAXone (ROCEPHIN) 1 g in 0.9% sodium chloride (MBP/ADV) 50 mL MBP  1 g IntraVENous Q24H    sodium chloride (NS) flush 5-40 mL  5-40 mL IntraVENous Q8H    sodium chloride (NS) flush 5-40 mL  5-40 mL IntraVENous PRN    docusate sodium (COLACE) capsule 100 mg  100 mg Oral BID        Review of Systems:  A detailed 10 organ review of systems is obtained with pertinent positives as listed in the History of Present Illness and Past Medical History. All others are negative. Objective:     Physical Exam:  Visit Vitals  BP (!) 144/81 (BP 1 Location: Right arm, BP Patient Position: At rest)   Pulse 81   Temp 97.6 °F (36.4 °C)   Resp 15   Ht 5' 4.02\" (1.626 m) Comment: not updated this admit   SpO2 99%   Breastfeeding No   BMI 19.59 kg/m²        Skin:  Extremities and face reveal no rashes. No ogden erythema. No telangiectasias on the chest wall. HEENT: Sclerae anicteric. Extra-occular muscles are intact. No oral ulcers. No abnormal pigmentation of the lips. The neck is supple. Cardiovascular: Regular rate and rhythm. Respiratory:  Comfortable breathing with no accessory muscle use. GI:  Abdomen nondistended, soft, and nontender. Normal active bowel sounds. No enlargement of the liver or spleen. No masses palpable. Rectal:  Deferred  Musculoskeletal: Extremities have good range of motion. Neurological: Impaired higher mental functions  Psychiatric:  Mood appears appropriate   Lymphatic:  No cervical or supraclavicular adenopathy. Lab/Data Review:  CMP: No results found for: NA, K, CL, CO2, AGAP, GLU, BUN, CREA, GFRAA, GFRNA, CA, MG, PHOS, ALB, TBIL, TP, ALB, GLOB, AGRAT, ALT  CBC: No results found for: WBC, HGB, HGBEXT, HCT, HCTEXT, PLT, PLTEXT, HGBEXT, HCTEXT, PLTEXT      Assessment/Plan:     1. Hypernatremia  Most likely cause of seizure    2. Urinary tract infection without hematuria, site unspecified  Management per primary team  Rectal bleeding most likely secondary to hemorrhoids or diverticular disease will monitor hemoglobin if there is drop in hemoglobin will do colonoscopy urgently otherwise elective colonoscopy as an outpatient.        Thank you for allowing me to participate in this patients care

## 2020-10-23 NOTE — PROGRESS NOTES
NEURO PROGRESS NOTE        SUBJECTIVE:   Mental status changes, metabolic encephalopathy, hyponatremia, UTI    EXAM:    Somnolent but arousable. Responds verbally though slowly.   Follows one-step commands this morning with some mild coaxing  Diffuse weakness improving      ASSESSMENT/PLAN:  On the overall, patient's neurological status is improving    ALLERGIES:    No Known Allergies    MEDS:      Current Facility-Administered Medications:     levETIRAcetam (KEPPRA) 500 mg in saline (iso-osm) 100 mL IVPB (premix), 500 mg, IntraVENous, Q12H, Randall Hernandes MD, 500 mg at 10/23/20 0030    LORazepam (ATIVAN) injection 1 mg, 1 mg, IntraVENous, Q6H PRN, Octaviano Phalen, Raphael I, MD, 1 mg at 10/23/20 0729    0.45% sodium chloride infusion, 25 mL/hr, IntraVENous, CONTINUOUS, Randall Hernandes MD, Last Rate: 25 mL/hr at 10/22/20 0443, 25 mL/hr at 10/22/20 0443    mupirocin (BACTROBAN) 2 % ointment, , Topical, BID, Randall Hernandes MD    acetaminophen (TYLENOL) tablet 650 mg, 650 mg, Oral, Q6H PRN, Megha Blum MD    cefTRIAXone (ROCEPHIN) 1 g in 0.9% sodium chloride (MBP/ADV) 50 mL MBP, 1 g, IntraVENous, Q24H, Randall Hernandes MD, Last Rate: 100 mL/hr at 10/22/20 1207, 1 g at 10/22/20 1207    sodium chloride (NS) flush 5-40 mL, 5-40 mL, IntraVENous, Q8H, Randall Hernandes MD, Stopped at 10/22/20 2200    sodium chloride (NS) flush 5-40 mL, 5-40 mL, IntraVENous, PRN, Bernadette SMITH MD    docusate sodium (COLACE) capsule 100 mg, 100 mg, Oral, BID, Randall Hernandes MD, Stopped at 10/22/20 0900    enoxaparin (LOVENOX) injection 30 mg, 30 mg, SubCUTAneous, Q24H, Randall Hernandes MD, 30 mg at 10/22/20 1207    LABS:  Recent Results (from the past 24 hour(s))   RENAL FUNCTION PANEL    Collection Time: 10/22/20 10:11 AM   Result Value Ref Range    Sodium 144 136 - 145 mmol/L    Potassium 3.9 3.5 - 5.1 mmol/L    Chloride 111 (H) 97 - 108 mmol/L    CO2 23 21 - 32 mmol/L    Anion gap 10 5 - 15 mmol/L    Glucose 105 (H) 65 - 100 mg/dL    BUN 15 6 - 20 mg/dL    Creatinine 1.12 (H) 0.55 - 1.02 mg/dL    BUN/Creatinine ratio 13 12 - 20      GFR est AA 58 (L) >60 ml/min/1.73m2    GFR est non-AA 48 (L) >60 ml/min/1.73m2    Calcium 8.5 8.5 - 10.1 mg/dL    Phosphorus 2.4 (L) 2.6 - 4.7 mg/dL    Albumin 3.0 (L) 3.5 - 5.0 g/dL   GLUCOSE, POC    Collection Time: 10/23/20  7:23 AM   Result Value Ref Range    Glucose (POC) 141 (H) 65 - 100 mg/dL    Performed by Stuart Knock        Visit Vitals  /77 (BP 1 Location: Right arm)   Pulse 77   Temp 97.7 °F (36.5 °C)   Resp 18   SpO2 100%   Breastfeeding No       Imaging:  CT HEAD WO CONT   Final Result   IMPRESSION: Postsurgical change similar compared to CT head April 24, 2020. No   intracranial hemorrhage.

## 2020-10-23 NOTE — PROCEDURES
[facility]  EEG    Name:  Neri Leo  MR#:  [id number]  :  1950  ACCOUNT #:  [de-identified]  DATE OF SERVICE:  10/20/2020    DIAGNOSIS:  Seizures. DESCRIPTION:  Recording is done digitally on computer. Electrodes are placed in a 10-20 international system. Initial coordinates recommend calibration, followed by biocalibration. Initial montages are bipolar montage. TECHNIQUE:  Tracing started with the patient awake, eyes closed. Well-formed bioccipital alpha rhythms in the 8 Hz frequency. As the recording continues, the patient is hooked up to an EKG machine that shows a heart rate of 120. Tracings continued with the patient being exposed to photic stimulation without any abnormality. Hyperventilation is not done. IMPRESSION:  This is a normal EEG, awake.       Saida Adams MD      TT/TONE_T/MADYSON_P  D:  10/23/2020 11:09  T:  10/23/2020 11:55  JOB #:  [id number]

## 2020-10-23 NOTE — PROGRESS NOTES
Comprehensive Nutrition Assessment    Type and Reason for Visit: RD nutrition re-screen/LOS    Nutrition Recommendations/Plan:     Continue Cardiac diet  [place] preferences as able    Nursing to document %meal  intakes in I/Os    Nutrition Assessment:  Admitted for UTI, severe hypernatremia, and AMS. COVID negative, remains on unit. Per Neuro, mentation slowly improving. Appetite documented as good with recent intake of 100% yesterday at lunch, one intake of 20% noted on 10/18, no other meals documented. Per RN, pt has good baseline appetite, usually eager to eat, requires set up but able to self-feed. Consumes at least 50% at each meal per RN, has intermittent periods of confusion that may impact completing meals. Attempted to reach pt via phone, unable to reach, will f/u as needed. Labs and meds reviewed. Malnutrition Assessment:  Malnutrition Status:  No malnutrition      Nutrition Related Findings:  Unable to perform NFPE 2/2 precautions. Per RN, pt appears nourished. SLP cleared for reg/thin texture. Per chart review pt denied dysphagia pta. No documented n/v or c/d. BM documented today, green, formed. No edema.       Wounds:    None       Current Nutrition Therapies:  DIET CARDIAC Regular    Anthropometric Measures:  · Height:  5' 4.02\" (162.6 cm)(not updated this admit)  · Current Body Wt:  51.8 kg (114 lb 3.2 oz)(1 year ago, not updated this admit)   · Admission Body Wt:   None taken  · Ideal Body Wt:  120 lbs:  95.2 %   · BMI Category:  Underweight (BMI less than 22) age over 72       Nutrition Diagnosis:   No nutrition diagnosis at this time     Nutrition Interventions:   Food and/or Nutrient Delivery: Continue current diet  Nutrition Education and Counseling: No recommendations at this time  Coordination of Nutrition Care: Continued inpatient monitoring    Nutrition Monitoring and Evaluation:   Behavioral-Environmental Outcomes:  N/A  Food/Nutrient Intake Outcomes: Food and nutrient intake  Physical Signs/Symptoms Outcomes: Weight    Discharge Planning:    No discharge needs at this time     Electronically signed by James Samuel on 10/23/2020 at 11:18 AM    Contact: [id number] [phone]

## 2020-10-23 NOTE — PROGRESS NOTES
0900 - reported blood clot in stool and seizure activity to DR. Hernandes. New orders received for lab work. Will notify neurology also    1500 patient bleeding from rectum aguilar blood noted, with blood clot. Reported to Dr. Alireza Leger. New orders received. 1600 Dr. Tomi Wade up to see patient. Awaiting lab work to assess cbc.    1190 nursing supervisor obtained blood work ordered due to respiratory unable to obtain via arterial stick, and  x 2 unsucessful    Patient up to bathroom voided no blood noted.

## 2020-10-23 NOTE — CONSULTS
NAME:  Pino Jose   :   1950   MRN:   [id number]     ATTENDING: Miladys Ortiz MD  PCP:  Diana Cristobal MD    Date/Time:  10/23/2020 10:19 PM      Subjective:   REQUESTING PHYSICIAN: Dr. Eddy Chol:    hypernatremia    Patient seen at bedside, seems to be in no distress  Somnolent but arousable  Sodium has now improved  Only on 1/2 NS at 25 ml/hr        Social History     Tobacco Use    Smoking status: Not on file   Substance Use Topics    Alcohol use: Not on file      No family history on file. No Known Allergies   Prior to Admission medications    Medication Sig Start Date End Date Taking? Authorizing Provider   acetaminophen (TYLENOL) 650 mg TbER Take 650 mg by mouth every six (6) hours as needed for Pain. Provider, Historical   calcium carbonate (OS-GIN) 500 mg calcium (1,250 mg) tablet Take 2 Tabs by mouth every six (6) hours as needed for Indigestion. Provider, Historical   desmopressin (DDAVP) 0.1 mg tablet Take 0.1 mg by mouth two (2) times a day. Indications: partial central diabetes insipidus    Provider, Historical   docusate sodium (COLACE) 100 mg capsule Take 100 mg by mouth two (2) times a day. Indications: constipation    Provider, Historical   ferrous sulfate 300 mg (60 mg iron)/5 mL syrup Take  by mouth three (3) times daily. Provider, Historical   lamoTRIgine (LaMICtal) 100 mg tablet Take 100 mg by mouth two (2) times a day. Indications: convulsive seizures    Provider, Historical   levETIRAcetam (KEPPRA) 100 mg/mL solution Take 10 mg/kg by mouth two (2) times a day. Provider, Historical   Saccharomyces boulardii (Florastor) 250 mg capsule Take 250 mg by mouth two (2) times a day. Provider, Historical   rivaroxaban (Xarelto) 20 mg tab tablet Take 20 mg by mouth daily (with dinner).  Indications: treatment to prevent recurrence of a clot in a deep vein    Provider, Historical       REVIEW OF SYSTEMS:     Unable to review as patient is a poor historian         Objective:   VITALS:    Visit Vitals  /77 (BP 1 Location: Right arm)   Pulse 77   Temp 97.7 °F (36.5 °C)   Resp 18   Ht 5' 4.02\" (1.626 m) Comment: not updated this admit   SpO2 100%   Breastfeeding No   BMI 19.59 kg/m²     Temp (24hrs), Av.7 °F (36.5 °C), Min:97.6 °F (36.4 °C), Max:97.7 °F (36.5 °C)      PHYSICAL EXAM:   General: awake, no acute distress. AMS+  HEENT: EOMI, no Icterus, no Pallor, pupils reactive, mucosa dry, normal inspection of ears and nose, throat clear. Neck: Neck is supple, No JVD, no thyromegaly,   Lungs: breathsounds normal, no respiratory distress on inspection, no rhonchi, no rales,  CVS: heart sounds normal, regular rate and rhythm, no murmurs, no rubs. GI: soft, nontender, normal BS, no palpable organomegaly,  Extremeties: no clubbing, no cyanosis, no edema,  Neuro: Alert, awake, altered mental status   Probably mental status is at baseline now   skin: normal skin turgor, no skin rashes   Musculoskeletal: no acute joint swellings    LAB DATA REVIEWED:    Recent Results (from the past 24 hour(s))   GLUCOSE, POC    Collection Time: 10/23/20  7:23 AM   Result Value Ref Range    Glucose (POC) 141 (H) 65 - 100 mg/dL    Performed by Rocco Beckham         1. Hypernatremia: Admitted with altered mental status. Initial sodium was high at 163   etiology was likely poor intake . Doubt central diabetes insipidus  Sodium had improved to 143 . No labs Today  I discontinued DDAVP and D5W  to avoid overcorrection/hyponatremia . not noted to be polyuric . Continue to hold   Currently on 25 mils per hour half-normal saline   I will check  renal panel in am  We will continue to monitor sodium levels      2. ?? History of chronic kidney disease stage III:   patient had fluctuating Renal functions probably related to superimposed dehydration with diabetes insipidus   Creatinine is improved to 1.1  We will continue to monitor renal functions to assess the baseline     3. Altered mental status:   Patient has history of dementia, status post brain aneurysm resection with some residual neurological changes. Current Mental status changes are probably related to dehydration and hyponatremia ,   improving , mental status is probably around baseline now  Continue to monitor clinically    4. Severe hypokalemia  -K is only 2.8 today. S/p 3 doses of po KCl 40 mEq of oral KCl  Will do stat renal panel now    5. Renal osteodystrophy: Calcium is stable. Phosphorus is 2.4. S/p replace with phosNaK  pending intact PTH, and vitamin D-25 hydroxyl level.        Signed: Joie Garcia MD

## 2020-10-23 NOTE — DISCHARGE SUMMARY
Discharge Summary     Patient: Bharat Gibson MRN: [id number]  SSN: [id number]    YOB: 1950  Age: 69 y.o. Sex: female       Admit Date: 10/17/2020    Discharge Date: 10/23/2020      Admission Diagnoses: Hypernatremia [E87.0]  UTI (urinary tract infection) [N39.0]  Hypernatremia [E87.0]  Possible seizure  Sirs  Discharge Diagnoses:   Problem List as of 10/23/2020 Never Reviewed          Codes Class Noted - Resolved    Hypernatremia ICD-10-CM: E87.0  ICD-9-CM: 276.0  10/17/2020 - Present        UTI (urinary tract infection) ICD-10-CM: N39.0  ICD-9-CM: 599.0  10/17/2020 - Present               Discharge Condition: [place] Course: 11-year-old patient admitted for altered mental status with severe hyponatremia patient was seen by nephrologist was placed on hypotonic care fluids and sodium improved. She was also treated for UTI and also for seizure. Afebrile vitals are stable    Consults: Nephrology    Significant Diagnostic Studies: labs:   Recent Results (from the past 24 hour(s))   GLUCOSE, POC    Collection Time: 10/23/20  7:23 AM   Result Value Ref Range    Glucose (POC) 141 (H) 65 - 100 mg/dL    Performed by Candi Jo          CT HEAD WO CONT   Final Result   IMPRESSION: Postsurgical change similar compared to CT head April 24, 2020. No   intracranial hemorrhage. Disposition: SNF    Discharge Medications:   Current Discharge Medication List      START taking these medications    Details   cefUROXime (CEFTIN) 500 mg tablet Take 1 Tab by mouth two (2) times a day for 7 days. Qty: 14 Tab, Refills: 0      levETIRAcetam (Keppra) 500 mg tablet Take 1 Tab by mouth two (2) times a day. Qty: 60 Tab, Refills: 0         CONTINUE these medications which have CHANGED    Details   docusate sodium (COLACE) 100 mg capsule Take 1 Cap by mouth two (2) times a day for 90 days.   Qty: 60 Cap, Refills: 2         CONTINUE these medications which have NOT CHANGED    Details   calcium carbonate (OS-GIN) 500 mg calcium (1,250 mg) tablet Take 2 Tabs by mouth every six (6) hours as needed for Indigestion. ferrous sulfate 300 mg (60 mg iron)/5 mL syrup Take  by mouth three (3) times daily. lamoTRIgine (LaMICtal) 100 mg tablet Take 100 mg by mouth two (2) times a day. Indications: convulsive seizures      Saccharomyces boulardii (Florastor) 250 mg capsule Take 250 mg by mouth two (2) times a day. rivaroxaban (Xarelto) 20 mg tab tablet Take 20 mg by mouth daily (with dinner). Indications: treatment to prevent recurrence of a clot in a deep vein         STOP taking these medications       acetaminophen (TYLENOL) 650 mg TbER Comments:   Reason for Stopping:         desmopressin (DDAVP) 0.1 mg tablet Comments:   Reason for Stopping:         levETIRAcetam (KEPPRA) 100 mg/mL solution Comments:   Reason for Stopping:               Activity: Activity as tolerated  Diet: Cardiac Diet  Wound Care: None needed  Patient had an episode of rectal bleed.   Discontinued discharge consult GI discontinue Lovenox    CBC BMP    Follow-up Appointments   Procedures    FOLLOW UP VISIT Appointment in: 3 - 5 Days     Standing Status:   Standing     Number of Occurrences:   1     Order Specific Question:   Appointment in     Answer:   3 - 5 Days     45 minutes discharge  Signed By: Ronn Fan MD     October 23, 2020

## 2020-10-24 PROCEDURE — 65270000029 HC RM PRIVATE

## 2020-10-24 PROCEDURE — 74011000258 HC RX REV CODE- 258: Performed by: INTERNAL MEDICINE

## 2020-10-24 PROCEDURE — 74011250637 HC RX REV CODE- 250/637: Performed by: INTERNAL MEDICINE

## 2020-10-24 PROCEDURE — 74011000258 HC RX REV CODE- 258: Performed by: STUDENT IN AN ORGANIZED HEALTH CARE EDUCATION/TRAINING PROGRAM

## 2020-10-24 PROCEDURE — 74011250636 HC RX REV CODE- 250/636: Performed by: INTERNAL MEDICINE

## 2020-10-24 PROCEDURE — 74011250636 HC RX REV CODE- 250/636: Performed by: STUDENT IN AN ORGANIZED HEALTH CARE EDUCATION/TRAINING PROGRAM

## 2020-10-24 RX ADMIN — MUPIROCIN: 20 OINTMENT TOPICAL at 14:04

## 2020-10-24 RX ADMIN — Medication 10 ML: at 15:56

## 2020-10-24 RX ADMIN — LEVETIRACETAM 750 MG: 100 INJECTION, SOLUTION INTRAVENOUS at 12:34

## 2020-10-24 RX ADMIN — MUPIROCIN: 20 OINTMENT TOPICAL at 01:23

## 2020-10-24 RX ADMIN — LORAZEPAM 1 MG: 2 INJECTION, SOLUTION INTRAMUSCULAR; INTRAVENOUS at 10:27

## 2020-10-24 RX ADMIN — Medication 10 ML: at 22:00

## 2020-10-24 RX ADMIN — DOCUSATE SODIUM 100 MG: 100 CAPSULE, LIQUID FILLED ORAL at 09:14

## 2020-10-24 RX ADMIN — CEFTRIAXONE SODIUM 1 G: 1 INJECTION, POWDER, FOR SOLUTION INTRAMUSCULAR; INTRAVENOUS at 11:56

## 2020-10-24 NOTE — PROGRESS NOTES
Pt had seizure. Pt recover to baseline. Ativan 1 mg IV per md orders. Called Dr Brittany Aguiar and he has no new orders. Called Dr Dung Estevez, Neurologist and he increase levetiracetam to 750 mg bid.

## 2020-10-24 NOTE — PROGRESS NOTES
NEUROLOGY  PROGRESS NOTE    Admission History/Pertinent Events  Clarisse Guillen is a 69y.o. year old femalewho presented on 10/17/2020 with encephalopathy and found to have a UTI and significant hyponatremia. Emergent CT head was negative for acute findings. There was concern for seizure-like activity for which patient had EEG which was without evidence of epileptiform discharges or seizure activity. Patient was started on levetiracetam nonetheless. Patient's encephalopathy improved with treatment of her metabolic derangements. ASSESSMENT/PLAN      Impression  Etiology of patient's encephalopathy was likely of a infectious and metabolic derangement etiology. No further neurologic workup is necessary at the current time. Plan      Encephalopathy  Associated: UTI, Hyponatremia  -Continue levetiracetam 500 BID  -No further neurologic workup is necessary    Patient to follow-up with Dr. Jeff Kumar (Neurology) week and a half after discharge from hospital.        SUBJECTIVE   Patient awake and alert. Patient does not have any complaints at the current time.       OBJECTIVE  Vital Signs  Temp:  [97.4 °F (36.3 °C)-98.5 °F (36.9 °C)]   Pulse (Heart Rate):  []   BP: (101-144)/(63-88)   Resp Rate:  [15-20]   O2 Sat (%):  [93 %-100 %]   Weight: --    MEDICATIONS    Current Facility-Administered Medications:     levETIRAcetam (KEPPRA) 500 mg in saline (iso-osm) 100 mL IVPB (premix), 500 mg, IntraVENous, Q12H, Randall Hernandes MD, 500 mg at 10/23/20 1459    LORazepam (ATIVAN) injection 1 mg, 1 mg, IntraVENous, Q6H PRN, Randall Marie MD, 1 mg at 10/23/20 0729    0.45% sodium chloride infusion, 25 mL/hr, IntraVENous, CONTINUOUS, Randall Hernandes MD, Last Rate: 25 mL/hr at 10/22/20 0443, 25 mL/hr at 10/22/20 0443    mupirocin (BACTROBAN) 2 % ointment, , Topical, BID, Randall Hernandes MD    acetaminophen (TYLENOL) tablet 650 mg, 650 mg, Oral, Q6H PRN, Yunior Jeffries MD    cefTRIAXone (ROCEPHIN) 1 g in 0.9% sodium chloride (MBP/ADV) 50 mL MBP, 1 g, IntraVENous, Q24H, Ranadll Hernandes MD, Last Rate: 100 mL/hr at 10/23/20 1922, 1 g at 10/23/20 1922    sodium chloride (NS) flush 5-40 mL, 5-40 mL, IntraVENous, Q8H, Randall Hernandes MD, Stopped at 10/23/20 2200    sodium chloride (NS) flush 5-40 mL, 5-40 mL, IntraVENous, PRN, Randall Ayers MD    docusate sodium (COLACE) capsule 100 mg, 100 mg, Oral, BID, Sandra Anand MD, Stopped at 10/22/20 0900      Physical/Neurological Exam  General: Elderly -American female  Cardiovascular: normal rate and rhythm   Pulmonary: CTAB      Patient awake, alert and oriented to person and place but not time, does not know who the president is or what the month is; following central and peripheral commands   No expressive or receptive aphasia; No dysarthria   Pupils react to light bilaterally; EOM Intact   No visual field deficits on gross exam   Intact to light touch on face bilaterally   Facial asymmetry which disappears with activation  Motor: 4/5 Throughout  No abnormal movements   Sensation to light touch intact grossly throughout       Labs: I've reviewed the labs for today     This document has been prepared by the Dragon voice recognition system, typographical errors may have occurred.  Attempts have been made to correct errors, however inadvertent errors may persist.

## 2020-10-24 NOTE — PROGRESS NOTES
Problem: Falls - Risk of  Goal: *Absence of Falls  Description: Document Edmonia Morning Fall Risk and appropriate interventions in the flowsheet. Outcome: Progressing Towards Goal  Note: Fall Risk Interventions:  Mobility Interventions: Bed/chair exit alarm    Mentation Interventions: Adequate sleep, hydration, pain control    Medication Interventions: Bed/chair exit alarm    Elimination Interventions: Call light in reach    History of Falls Interventions: Bed/chair exit alarm         Problem: Risk for Spread of Infection  Goal: Prevent transmission of infectious organism to others  Description: Prevent the transmission of infectious organisms to other patients, staff members, and visitors.   Outcome: Progressing Towards Goal

## 2020-10-24 NOTE — PROGRESS NOTES
General Daily Progress Note          Patient Name:   Dion Green       YOB: 1950       Age:  69 y.o. Admit Date: 10/17/2020      Subjective:        alert awake denies any chest pain or shortness of breath           Objective:     Visit Vitals  BP (!) 143/88   Pulse 77   Temp 98.4 °F (36.9 °C)   Resp 18   Ht 5' 4.02\" (1.626 m)   SpO2 96%   Breastfeeding No   BMI 19.59 kg/m²        Recent Results (from the past 24 hour(s))   RENAL FUNCTION PANEL    Collection Time: 10/23/20  6:30 PM   Result Value Ref Range    Sodium 146 (H) 136 - 145 mmol/L    Potassium 3.5 3.5 - 5.1 mmol/L    Chloride 115 (H) 97 - 108 mmol/L    CO2 26 21 - 32 mmol/L    Anion gap 5 5 - 15 mmol/L    Glucose 85 65 - 100 mg/dL    BUN 12 6 - 20 mg/dL    Creatinine 1.00 0.55 - 1.02 mg/dL    BUN/Creatinine ratio 12 12 - 20      GFR est AA >60 >60 ml/min/1.73m2    GFR est non-AA 55 (L) >60 ml/min/1.73m2    Calcium 8.8 8.5 - 10.1 mg/dL    Phosphorus 2.0 (L) 2.6 - 4.7 mg/dL    Albumin 3.2 (L) 3.5 - 5.0 g/dL   CBC WITH AUTOMATED DIFF    Collection Time: 10/23/20  6:30 PM   Result Value Ref Range    WBC 4.0 3.6 - 11.0 K/uL    RBC 5.22 (H) 3.80 - 5.20 M/uL    HGB 15.2 11.5 - 16.0 g/dL    HCT 47.3 (H) 35.0 - 47.0 %    MCV 90.6 80.0 - 99.0 FL    MCH 29.1 26.0 - 34.0 PG    MCHC 32.1 30.0 - 36.5 g/dL    RDW 22.4 (H) 11.5 - 14.5 %    PLATELET 057 (L) 123 - 400 K/uL    NEUTROPHILS 31 (L) 32 - 75 %    LYMPHOCYTES 56 (H) 12 - 49 %    MONOCYTES 11 5 - 13 %    EOSINOPHILS 2 0 - 7 %    BASOPHILS 0 0 - 1 %    IMMATURE GRANULOCYTES 0 0.0 - 0.5 %    ABS. NEUTROPHILS 1.3 (L) 1.8 - 8.0 K/UL    ABS. LYMPHOCYTES 2.2 0.8 - 3.5 K/UL    ABS. MONOCYTES 0.5 0.0 - 1.0 K/UL    ABS. EOSINOPHILS 0.1 0.0 - 0.4 K/UL    ABS. BASOPHILS 0.0 0.0 - 0.1 K/UL    ABS. IMM. GRANS. 0.0 0.00 - 0.04 K/UL    DF AUTOMATED       [unfilled]      Review of Systems    Constitutional: Negative for chills and fever. HENT: Negative. Eyes: Negative. Respiratory: Negative. Cardiovascular: Negative. Gastrointestinal: Negative for abdominal pain and nausea. Skin: Negative. Neurological: Negative. Physical Exam:      Constitutional: pt is oriented to person, place, and time. HENT:   Head: Normocephalic and atraumatic. Eyes: Pupils are equal, round, and reactive to light. EOM are normal.   Cardiovascular: Normal rate, regular rhythm and normal heart sounds. Pulmonary/Chest: Breath sounds normal. No wheezes. No rales. Exhibits no tenderness. Abdominal: Soft. Bowel sounds are normal. There is no abdominal tenderness. There is no rebound and no guarding. Musculoskeletal: Normal range of motion. Neurological: pt is alert and oriented to person, place, and time. CT HEAD WO CONT   Final Result   IMPRESSION: Postsurgical change similar compared to CT head April 24, 2020. No   intracranial hemorrhage.                  Recent Results (from the past 24 hour(s))   RENAL FUNCTION PANEL    Collection Time: 10/23/20  6:30 PM   Result Value Ref Range    Sodium 146 (H) 136 - 145 mmol/L    Potassium 3.5 3.5 - 5.1 mmol/L    Chloride 115 (H) 97 - 108 mmol/L    CO2 26 21 - 32 mmol/L    Anion gap 5 5 - 15 mmol/L    Glucose 85 65 - 100 mg/dL    BUN 12 6 - 20 mg/dL    Creatinine 1.00 0.55 - 1.02 mg/dL    BUN/Creatinine ratio 12 12 - 20      GFR est AA >60 >60 ml/min/1.73m2    GFR est non-AA 55 (L) >60 ml/min/1.73m2    Calcium 8.8 8.5 - 10.1 mg/dL    Phosphorus 2.0 (L) 2.6 - 4.7 mg/dL    Albumin 3.2 (L) 3.5 - 5.0 g/dL   CBC WITH AUTOMATED DIFF    Collection Time: 10/23/20  6:30 PM   Result Value Ref Range    WBC 4.0 3.6 - 11.0 K/uL    RBC 5.22 (H) 3.80 - 5.20 M/uL    HGB 15.2 11.5 - 16.0 g/dL    HCT 47.3 (H) 35.0 - 47.0 %    MCV 90.6 80.0 - 99.0 FL    MCH 29.1 26.0 - 34.0 PG    MCHC 32.1 30.0 - 36.5 g/dL    RDW 22.4 (H) 11.5 - 14.5 %    PLATELET 260 (L) 667 - 400 K/uL    NEUTROPHILS 31 (L) 32 - 75 %    LYMPHOCYTES 56 (H) 12 - 49 %    MONOCYTES 11 5 - 13 %    EOSINOPHILS 2 0 - 7 %    BASOPHILS 0 0 - 1 %    IMMATURE GRANULOCYTES 0 0.0 - 0.5 %    ABS. NEUTROPHILS 1.3 (L) 1.8 - 8.0 K/UL    ABS. LYMPHOCYTES 2.2 0.8 - 3.5 K/UL    ABS. MONOCYTES 0.5 0.0 - 1.0 K/UL    ABS. EOSINOPHILS 0.1 0.0 - 0.4 K/UL    ABS. BASOPHILS 0.0 0.0 - 0.1 K/UL    ABS. IMM. GRANS. 0.0 0.00 - 0.04 K/UL    DF AUTOMATED         Results     Procedure Component Value Units Date/Time    MRSA SCREEN - PCR (NASAL) [[id number]]  (Abnormal) Collected:  10/18/20 0130    Order Status:  Completed Specimen:  Swab Updated:  10/18/20 0553     MRSA by PCR, Nasal DETECTED        Comment: Results verified, phoned to and read back by  Sulema Flores RN @ 0552/W3       CULTURE, URINE [[id number]] Collected:  10/17/20 1215    Order Status:  Completed Specimen:  Urine Updated:  10/19/20 1441     Special Requests: No Special Requests        Culture result: No Growth (<1000 cfu/mL)              Labs:     Recent Labs     10/23/20  1830   WBC 4.0   HGB 15.2   HCT 47.3*   *     Recent Labs     10/23/20  1830 10/22/20  1011   * 144   K 3.5 3.9   * 111*   CO2 26 23   BUN 12 15   CREA 1.00 1.12*   GLU 85 105*   CA 8.8 8.5   PHOS 2.0* 2.4*     Recent Labs     10/23/20  1830 10/22/20  1011   ALB 3.2* 3.0*     No results for input(s): INR, PTP, APTT, INREXT in the last 72 hours. No results for input(s): FE, TIBC, PSAT, FERR in the last 72 hours. No results found for: FOL, RBCF   No results for input(s): PH, PCO2, PO2 in the last 72 hours. No results for input(s): CPK, CKNDX, TROIQ in the last 72 hours.     No lab exists for component: CPKMB  No results found for: CHOL, CHOLX, CHLST, CHOLV, HDL, HDLP, LDL, LDLC, DLDLP, TGLX, TRIGL, TRIGP, CHHD, CHHDX  Lab Results   Component Value Date/Time    Glucose (POC) 141 (H) 10/23/2020 07:23 AM    Glucose (POC) 338 (H) 10/22/2020 04:51 AM    Glucose (POC) 106 (H) 10/20/2020 10:01 AM     Lab Results   Component Value Date/Time    Color Yellow/Straw 10/17/2020 01:42 AM    Appearance Clear 10/17/2020 01:42 AM    Specific gravity 1.027 10/17/2020 01:42 AM    Specific gravity 1.012 11/30/2013 04:50 AM    pH (UA) 5.0 10/17/2020 01:42 AM    Protein Negative 10/17/2020 01:42 AM    Glucose Negative 10/17/2020 01:42 AM    Ketone Negative 10/17/2020 01:42 AM    Bilirubin Negative 10/17/2020 01:42 AM    Urobilinogen 0.1 10/17/2020 01:42 AM    Nitrites Negative 10/17/2020 01:42 AM    Leukocyte Esterase Negative 10/17/2020 01:42 AM    Epithelial cells FEW 11/30/2013 04:50 AM    Bacteria Negative 10/17/2020 01:42 AM    WBC 0-4 10/17/2020 01:42 AM    RBC 0-5 10/17/2020 01:42 AM         Assessment:     Metabolic encephalopathy  Hypernatremia  UTI  History of SIADH  Dementia  Static post PEG tube        Plan:     Ready for discharge to skilled care rehab        Current Facility-Administered Medications:     levETIRAcetam (KEPPRA) 750 mg in 0.9% sodium chloride 100 mL IVPB, 750 mg, IntraVENous, Q12H, Tamara Tarango MD, 750 mg at 10/24/20 1234    LORazepam (ATIVAN) injection 1 mg, 1 mg, IntraVENous, Q6H PRN, Randall Romo MD, 1 mg at 10/24/20 1027    0.45% sodium chloride infusion, 75 mL/hr, IntraVENous, CONTINUOUS, Chelita Argueta MD, Last Rate: 75 mL/hr at 10/24/20 1244, 75 mL/hr at 10/24/20 1244    mupirocin (BACTROBAN) 2 % ointment, , Topical, BID, Randall Hernandes MD    acetaminophen (TYLENOL) tablet 650 mg, 650 mg, Oral, Q6H PRN, Roland Benjamin MD    cefTRIAXone (ROCEPHIN) 1 g in 0.9% sodium chloride (MBP/ADV) 50 mL MBP, 1 g, IntraVENous, Q24H, Randall Hernandes MD, Last Rate: 100 mL/hr at 10/24/20 1156, 1 g at 10/24/20 1156    sodium chloride (NS) flush 5-40 mL, 5-40 mL, IntraVENous, Q8H, Randall Hernandes MD, Stopped at 10/23/20 2200    sodium chloride (NS) flush 5-40 mL, 5-40 mL, IntraVENous, PRN, Randall Hernandes MD    docusate sodium (COLACE) capsule 100 mg, 100 mg, Oral, BID, Latrice Arias MD, 100 mg at 10/24/20 7925

## 2020-10-24 NOTE — CONSULTS
NAME:  Ronit Adams   :   1950   MRN:   [id number]     ATTENDING: Otto Gonsalez MD  PCP:  Reinaldo Foster MD    Date/Time:  10/24/2020 10:19 PM      Subjective:   REQUESTING PHYSICIAN: Dr. Osmin Damico:    hypernatremia    Patient seen at bedside, seems to be in no distress  Apparently she had another seizure. Dose of Keppra increased to 750 twice daily  Somnolent but arousable  Sodium has now improved  Only on 1/2 NS at 25 ml/hr        Social History     Tobacco Use    Smoking status: Not on file   Substance Use Topics    Alcohol use: Not on file      No family history on file. No Known Allergies   Prior to Admission medications    Medication Sig Start Date End Date Taking? Authorizing Provider   docusate sodium (COLACE) 100 mg capsule Take 1 Cap by mouth two (2) times a day for 90 days. 10/23/20 1/21/21 Yes Asia Valderrama MD   cefUROXime (CEFTIN) 500 mg tablet Take 1 Tab by mouth two (2) times a day for 7 days. 10/23/20 10/30/20 Yes Asia Valderrama MD   levETIRAcetam (Keppra) 500 mg tablet Take 1 Tab by mouth two (2) times a day. 10/23/20  Yes Asia Valderrama MD   acetaminophen (TYLENOL) 650 mg TbER Take 650 mg by mouth every six (6) hours as needed for Pain. Provider, Historical   calcium carbonate (OS-GIN) 500 mg calcium (1,250 mg) tablet Take 2 Tabs by mouth every six (6) hours as needed for Indigestion. Provider, Historical   desmopressin (DDAVP) 0.1 mg tablet Take 0.1 mg by mouth two (2) times a day. Indications: partial central diabetes insipidus    Provider, Historical   docusate sodium (COLACE) 100 mg capsule Take 100 mg by mouth two (2) times a day. Indications: constipation    Provider, Historical   ferrous sulfate 300 mg (60 mg iron)/5 mL syrup Take  by mouth three (3) times daily. Provider, Historical   lamoTRIgine (LaMICtal) 100 mg tablet Take 100 mg by mouth two (2) times a day.  Indications: convulsive seizures    Provider, Historical   levETIRAcetam (KEPPRA) 100 mg/mL solution Take 10 mg/kg by mouth two (2) times a day. Provider, Historical   Saccharomyces boulardii (Florastor) 250 mg capsule Take 250 mg by mouth two (2) times a day. Provider, Historical   rivaroxaban (Xarelto) 20 mg tab tablet Take 20 mg by mouth daily (with dinner). Indications: treatment to prevent recurrence of a clot in a deep vein    Provider, Historical       REVIEW OF SYSTEMS:     Unable to review as patient is a poor historian         Objective:   VITALS:    Visit Vitals  BP (!) 143/88   Pulse 77   Temp 98.4 °F (36.9 °C)   Resp 18   Ht 5' 4.02\" (1.626 m) Comment: not updated this admit   SpO2 96%   Breastfeeding No   BMI 19.59 kg/m²     Temp (24hrs), Av.1 °F (36.7 °C), Min:97.6 °F (36.4 °C), Max:98.5 °F (36.9 °C)      PHYSICAL EXAM:   General: awake, no acute distress. AMS+  HEENT: EOMI, no Icterus, no Pallor, pupils reactive, mucosa dry, normal inspection of ears and nose, throat clear. Neck: Neck is supple, No JVD, no thyromegaly,   Lungs: breathsounds normal, no respiratory distress on inspection, no rhonchi, no rales,  CVS: heart sounds normal, regular rate and rhythm, no murmurs, no rubs.   GI: soft, nontender, normal BS, no palpable organomegaly,  Extremeties: no clubbing, no cyanosis, no edema,  Neuro: Alert, awake, altered mental status   Probably mental status is at baseline now   skin: normal skin turgor, no skin rashes   Musculoskeletal: no acute joint swellings    LAB DATA REVIEWED:    Recent Results (from the past 24 hour(s))   RENAL FUNCTION PANEL    Collection Time: 10/23/20  6:30 PM   Result Value Ref Range    Sodium 146 (H) 136 - 145 mmol/L    Potassium 3.5 3.5 - 5.1 mmol/L    Chloride 115 (H) 97 - 108 mmol/L    CO2 26 21 - 32 mmol/L    Anion gap 5 5 - 15 mmol/L    Glucose 85 65 - 100 mg/dL    BUN 12 6 - 20 mg/dL    Creatinine 1.00 0.55 - 1.02 mg/dL    BUN/Creatinine ratio 12 12 - 20      GFR est AA >60 >60 ml/min/1.73m2    GFR est non-AA 55 (L) >60 ml/min/1.73m2    Calcium 8.8 8.5 - 10.1 mg/dL    Phosphorus 2.0 (L) 2.6 - 4.7 mg/dL    Albumin 3.2 (L) 3.5 - 5.0 g/dL   CBC WITH AUTOMATED DIFF    Collection Time: 10/23/20  6:30 PM   Result Value Ref Range    WBC 4.0 3.6 - 11.0 K/uL    RBC 5.22 (H) 3.80 - 5.20 M/uL    HGB 15.2 11.5 - 16.0 g/dL    HCT 47.3 (H) 35.0 - 47.0 %    MCV 90.6 80.0 - 99.0 FL    MCH 29.1 26.0 - 34.0 PG    MCHC 32.1 30.0 - 36.5 g/dL    RDW 22.4 (H) 11.5 - 14.5 %    PLATELET 770 (L) 164 - 400 K/uL    NEUTROPHILS 31 (L) 32 - 75 %    LYMPHOCYTES 56 (H) 12 - 49 %    MONOCYTES 11 5 - 13 %    EOSINOPHILS 2 0 - 7 %    BASOPHILS 0 0 - 1 %    IMMATURE GRANULOCYTES 0 0.0 - 0.5 %    ABS. NEUTROPHILS 1.3 (L) 1.8 - 8.0 K/UL    ABS. LYMPHOCYTES 2.2 0.8 - 3.5 K/UL    ABS. MONOCYTES 0.5 0.0 - 1.0 K/UL    ABS. EOSINOPHILS 0.1 0.0 - 0.4 K/UL    ABS. BASOPHILS 0.0 0.0 - 0.1 K/UL    ABS. IMM. GRANS. 0.0 0.00 - 0.04 K/UL    DF AUTOMATED          1. Hypernatremia: Admitted with altered mental status. Initial sodium was high at 163   etiology was likely poor intake . Doubt central diabetes insipidus  Sodium had improved to 146I discontinued DDAVP and D5W  to avoid overcorrection/hyponatremia . not noted to be polyuric . Continue to hold   Currently on 25 mils per hour half-normal saline increase to 75 mils per hour  I will check  renal panel in am  We will continue to monitor sodium levels      2. ?? History of chronic kidney disease stage III:   patient had fluctuating Renal functions probably related to superimposed dehydration with diabetes insipidus   Creatinine is improved to 1.1  We will continue to monitor renal functions to assess the baseline     3. Altered mental status:   Patient has history of dementia, status post brain aneurysm resection with some residual neurological changes.     Current Mental status changes are probably related to dehydration and hyponatremia ,   improving , mental status is probably around baseline now  Continue to monitor clinically    4. Severe hypokalemia  -K is only 2.8 today. S/p 3 doses of po KCl 40 mEq of oral KCl  Will do stat renal panel now    5. Renal osteodystrophy: Calcium is stable. Phosphorus is 2.4. S/p replace with phosNaK  pending intact PTH, and vitamin D-25 hydroxyl level.        Signed: Deshawn Ruby MD

## 2020-10-24 NOTE — PROGRESS NOTES
NEUROLOGY  PROGRESS NOTE    Admission History/Pertinent Events  Nathan Vanegas is a 69y.o. year old femalewho presented on 10/17/2020 with encephalopathy and found to have a UTI and significant hyponatremia. Emergent CT head was negative for acute findings. There was concern for seizure-like activity for which patient had EEG which was without evidence of epileptiform discharges or seizure activity. Patient was started on levetiracetam nonetheless. Patient's encephalopathy improved with treatment of her metabolic derangements. ASSESSMENT/PLAN      Impression  Patient reportedly with another seizure-like episode on the morning of 10/24/2020. Patient was given IV lorazepam 1 which aborted the episode. Patient's levetiracetam was increased to 750 BID. Upon speaking to patient's nurse, patient reportedly has behavioral arrest momentarily and then quickly returned to baseline but remembers what happened during the event. Do not feel that the spells are epileptic in nature given their semiology. Advised patient's nurse to provide patient with painful stimuli next time she has a spell to see if she reacts to it. Would be mindful of increasing patient's AEDs any further. Plan      Encephalopathy + Spells  Associated: UTI, Hyponatremia  -Continue levetiracetam 750 BID  -Seizure Precautions  -STAT IV lorazepam 2 with any clinical seizure activity lasting greater than 3 minutes and contact neurology for further recommendations    Patient to follow-up with Dr. Jorge Luis Wang (Neurology) week and a half after discharge from hospital.        SUBJECTIVE   Patient doing well this morning without any neurologic complaints. Patient does report some stiffness in the left shoulder however. No changes on neurological examination.       OBJECTIVE  Vital Signs  Temp:  [97.4 °F (36.3 °C)-98.5 °F (36.9 °C)]   Pulse (Heart Rate):  []   BP: (101-144)/(63-88)   Resp Rate:  [15-20]   O2 Sat (%):  [93 %-100 %]   Weight: --    MEDICATIONS    Current Facility-Administered Medications:     levETIRAcetam (KEPPRA) 750 mg in 0.9% sodium chloride 100 mL IVPB, 750 mg, IntraVENous, Q12H, Tamara West MD, 750 mg at 10/24/20 1234    LORazepam (ATIVAN) injection 1 mg, 1 mg, IntraVENous, Q6H PRN, Randall Moya MD, 1 mg at 10/24/20 1027    0.45% sodium chloride infusion, 75 mL/hr, IntraVENous, CONTINUOUS, Rozina Chau MD, Last Rate: 75 mL/hr at 10/24/20 1244, 75 mL/hr at 10/24/20 1244    mupirocin (BACTROBAN) 2 % ointment, , Topical, BID, Randall Hernandes MD    acetaminophen (TYLENOL) tablet 650 mg, 650 mg, Oral, Q6H PRN, Hair Heath MD    cefTRIAXone (ROCEPHIN) 1 g in 0.9% sodium chloride (MBP/ADV) 50 mL MBP, 1 g, IntraVENous, Q24H, Randall Hernandes MD, Last Rate: 100 mL/hr at 10/24/20 1156, 1 g at 10/24/20 1156    sodium chloride (NS) flush 5-40 mL, 5-40 mL, IntraVENous, Q8H, Randall Hernandes MD, 10 mL at 10/24/20 1556    sodium chloride (NS) flush 5-40 mL, 5-40 mL, IntraVENous, PRN, Randall Moya MD    docusate sodium (COLACE) capsule 100 mg, 100 mg, Oral, BID, Hilario Kelly MD, 100 mg at 10/24/20 1990      Physical/Neurological Exam  General: Elderly -American female  Cardiovascular: normal rate and rhythm   Pulmonary: CTAB      Patient awake, alert and oriented to person and place but not time, does not know who the president is or what the month is; following central and peripheral commands   No expressive or receptive aphasia; No dysarthria   Pupils react to light bilaterally; EOM Intact   No visual field deficits on gross exam   Intact to light touch on face bilaterally   Facial asymmetry which disappears with activation  Motor: 4/5 Throughout  No abnormal movements   Sensation to light touch intact grossly throughout       Labs: I've reviewed the labs for today     This document has been prepared by the Dragon voice recognition system, typographical errors may have occurred.  Attempts have been made to correct errors, however inadvertent errors may persist.

## 2020-10-25 LAB
ALBUMIN SERPL-MCNC: 2.7 G/DL (ref 3.5–5)
ALBUMIN SERPL-MCNC: 2.7 G/DL (ref 3.5–5)
ALBUMIN/GLOB SERPL: 0.8 {RATIO} (ref 1.1–2.2)
ALP SERPL-CCNC: 120 U/L (ref 45–117)
ALT SERPL-CCNC: 15 U/L (ref 12–78)
ANION GAP SERPL CALC-SCNC: 5 MMOL/L (ref 5–15)
ANION GAP SERPL CALC-SCNC: 6 MMOL/L (ref 5–15)
AST SERPL W P-5'-P-CCNC: 24 U/L (ref 15–37)
BASOPHILS # BLD: 0 K/UL (ref 0–0.1)
BASOPHILS NFR BLD: 0 % (ref 0–1)
BILIRUB SERPL-MCNC: 0.2 MG/DL (ref 0.2–1)
BUN SERPL-MCNC: 12 MG/DL (ref 6–20)
BUN SERPL-MCNC: 12 MG/DL (ref 6–20)
BUN/CREAT SERPL: 12 (ref 12–20)
BUN/CREAT SERPL: 13 (ref 12–20)
CA-I BLD-MCNC: 8.2 MG/DL (ref 8.5–10.1)
CA-I BLD-MCNC: 8.2 MG/DL (ref 8.5–10.1)
CHLORIDE SERPL-SCNC: 120 MMOL/L (ref 97–108)
CHLORIDE SERPL-SCNC: 121 MMOL/L (ref 97–108)
CO2 SERPL-SCNC: 24 MMOL/L (ref 21–32)
CO2 SERPL-SCNC: 25 MMOL/L (ref 21–32)
CREAT SERPL-MCNC: 0.96 MG/DL (ref 0.55–1.02)
CREAT SERPL-MCNC: 1.01 MG/DL (ref 0.55–1.02)
DIFFERENTIAL METHOD BLD: ABNORMAL
EOSINOPHIL # BLD: 0.1 K/UL (ref 0–0.4)
EOSINOPHIL NFR BLD: 2 % (ref 0–7)
ERYTHROCYTE [DISTWIDTH] IN BLOOD BY AUTOMATED COUNT: 22.1 % (ref 11.5–14.5)
GLOBULIN SER CALC-MCNC: 3.5 G/DL (ref 2–4)
GLUCOSE SERPL-MCNC: 103 MG/DL (ref 65–100)
GLUCOSE SERPL-MCNC: 104 MG/DL (ref 65–100)
HCT VFR BLD AUTO: 40.3 % (ref 35–47)
HCT VFR BLD AUTO: 40.7 % (ref 35–47)
HGB BLD-MCNC: 12.6 G/DL (ref 11.5–16)
HGB BLD-MCNC: 12.6 G/DL (ref 11.5–16)
IMM GRANULOCYTES # BLD AUTO: 0 K/UL (ref 0–0.04)
IMM GRANULOCYTES NFR BLD AUTO: 0 % (ref 0–0.5)
LYMPHOCYTES # BLD: 2 K/UL (ref 0.8–3.5)
LYMPHOCYTES NFR BLD: 47 % (ref 12–49)
MCH RBC QN AUTO: 28.3 PG (ref 26–34)
MCHC RBC AUTO-ENTMCNC: 31 G/DL (ref 30–36.5)
MCV RBC AUTO: 91.3 FL (ref 80–99)
MONOCYTES # BLD: 0.5 K/UL (ref 0–1)
MONOCYTES NFR BLD: 13 % (ref 5–13)
NEUTS SEG # BLD: 1.6 K/UL (ref 1.8–8)
NEUTS SEG NFR BLD: 38 % (ref 32–75)
PHOSPHATE SERPL-MCNC: 2.1 MG/DL (ref 2.6–4.7)
PLATELET # BLD AUTO: 145 K/UL (ref 150–400)
POTASSIUM SERPL-SCNC: 3.4 MMOL/L (ref 3.5–5.1)
POTASSIUM SERPL-SCNC: 3.4 MMOL/L (ref 3.5–5.1)
PROT SERPL-MCNC: 6.2 G/DL (ref 6.4–8.2)
RBC # BLD AUTO: 4.46 M/UL (ref 3.8–5.2)
SODIUM SERPL-SCNC: 150 MMOL/L (ref 136–145)
SODIUM SERPL-SCNC: 151 MMOL/L (ref 136–145)
WBC # BLD AUTO: 4.2 K/UL (ref 3.6–11)

## 2020-10-25 PROCEDURE — 85025 COMPLETE CBC W/AUTO DIFF WBC: CPT

## 2020-10-25 PROCEDURE — 65270000029 HC RM PRIVATE

## 2020-10-25 PROCEDURE — 36415 COLL VENOUS BLD VENIPUNCTURE: CPT

## 2020-10-25 PROCEDURE — 80053 COMPREHEN METABOLIC PANEL: CPT

## 2020-10-25 PROCEDURE — 74011000258 HC RX REV CODE- 258: Performed by: STUDENT IN AN ORGANIZED HEALTH CARE EDUCATION/TRAINING PROGRAM

## 2020-10-25 PROCEDURE — 74011250637 HC RX REV CODE- 250/637: Performed by: INTERNAL MEDICINE

## 2020-10-25 PROCEDURE — 74011000258 HC RX REV CODE- 258: Performed by: INTERNAL MEDICINE

## 2020-10-25 PROCEDURE — 85018 HEMOGLOBIN: CPT

## 2020-10-25 PROCEDURE — 74011250636 HC RX REV CODE- 250/636: Performed by: STUDENT IN AN ORGANIZED HEALTH CARE EDUCATION/TRAINING PROGRAM

## 2020-10-25 PROCEDURE — 80069 RENAL FUNCTION PANEL: CPT

## 2020-10-25 PROCEDURE — 74011250636 HC RX REV CODE- 250/636: Performed by: INTERNAL MEDICINE

## 2020-10-25 RX ORDER — DEXTROSE MONOHYDRATE 50 MG/ML
75 INJECTION, SOLUTION INTRAVENOUS CONTINUOUS
Status: DISCONTINUED | OUTPATIENT
Start: 2020-10-25 | End: 2020-10-27

## 2020-10-25 RX ORDER — AMLODIPINE BESYLATE 5 MG/1
5 TABLET ORAL DAILY
Status: DISCONTINUED | OUTPATIENT
Start: 2020-10-26 | End: 2020-10-28 | Stop reason: HOSPADM

## 2020-10-25 RX ORDER — POTASSIUM CHLORIDE 20 MEQ/1
40 TABLET, EXTENDED RELEASE ORAL
Status: COMPLETED | OUTPATIENT
Start: 2020-10-25 | End: 2020-10-25

## 2020-10-25 RX ADMIN — Medication 10 ML: at 03:24

## 2020-10-25 RX ADMIN — MUPIROCIN: 20 OINTMENT TOPICAL at 09:20

## 2020-10-25 RX ADMIN — LORAZEPAM 1 MG: 2 INJECTION, SOLUTION INTRAMUSCULAR; INTRAVENOUS at 03:25

## 2020-10-25 RX ADMIN — DEXTROSE MONOHYDRATE 75 ML/HR: 50 INJECTION, SOLUTION INTRAVENOUS at 16:07

## 2020-10-25 RX ADMIN — DOCUSATE SODIUM 100 MG: 100 CAPSULE, LIQUID FILLED ORAL at 09:19

## 2020-10-25 RX ADMIN — LEVETIRACETAM 750 MG: 100 INJECTION, SOLUTION INTRAVENOUS at 23:26

## 2020-10-25 RX ADMIN — CEFTRIAXONE SODIUM 1 G: 1 INJECTION, POWDER, FOR SOLUTION INTRAMUSCULAR; INTRAVENOUS at 13:36

## 2020-10-25 RX ADMIN — LEVETIRACETAM 750 MG: 100 INJECTION, SOLUTION INTRAVENOUS at 12:17

## 2020-10-25 RX ADMIN — DIBASIC SODIUM PHOSPHATE, MONOBASIC POTASSIUM PHOSPHATE AND MONOBASIC SODIUM PHOSPHATE 1 TABLET: 852; 155; 130 TABLET ORAL at 23:28

## 2020-10-25 RX ADMIN — POTASSIUM CHLORIDE 40 MEQ: 1500 TABLET, EXTENDED RELEASE ORAL at 16:09

## 2020-10-25 RX ADMIN — Medication 10 ML: at 13:44

## 2020-10-25 RX ADMIN — LEVETIRACETAM 750 MG: 100 INJECTION, SOLUTION INTRAVENOUS at 03:12

## 2020-10-25 NOTE — PROGRESS NOTES
Problem: Falls - Risk of  Goal: *Absence of Falls  Description: Document Richard Merlin Fall Risk and appropriate interventions in the flowsheet. Outcome: Progressing Towards Goal  Note: Fall Risk Interventions:  Mobility Interventions: Bed/chair exit alarm    Mentation Interventions: Adequate sleep, hydration, pain control    Medication Interventions: Bed/chair exit alarm    Elimination Interventions: Call light in reach    History of Falls Interventions: Bed/chair exit alarm         Problem: Pressure Injury - Risk of  Goal: *Prevention of pressure injury  Description: Document Lexx Scale and appropriate interventions in the flowsheet.   Outcome: Progressing Towards Goal  Note: Pressure Injury Interventions:  Sensory Interventions: Keep linens dry and wrinkle-free    Moisture Interventions: Absorbent underpads    Activity Interventions: Pressure redistribution bed/mattress(bed type)    Mobility Interventions: HOB 30 degrees or less    Nutrition Interventions: Document food/fluid/supplement intake    Friction and Shear Interventions: Minimize layers

## 2020-10-25 NOTE — PROGRESS NOTES
Pt resting in bed hob up resp even nonlabored.   Bed in low position side rails up call light in reach

## 2020-10-25 NOTE — CONSULTS
NAME:  Hudson Law   :   1950   MRN:   [id number]     ATTENDING: Stephanie Huggins MD  PCP:  Cris Willis MD    Date/Time:  10/25/2020 10:19 PM      Subjective:   REQUESTING PHYSICIAN: Dr. Kimani Alvarado:    hypernatremia    Patient seen at bedside, seems to be in no distress  Apparently she had another seizure yesterday. Dose of Keppra increased to 750 twice daily  Sodium is 150 today. on Half-normal saline at 75 mils per hour      Social History     Tobacco Use    Smoking status: Not on file   Substance Use Topics    Alcohol use: Not on file      No family history on file. No Known Allergies   Prior to Admission medications    Medication Sig Start Date End Date Taking? Authorizing Provider   docusate sodium (COLACE) 100 mg capsule Take 1 Cap by mouth two (2) times a day for 90 days. 10/23/20 1/21/21 Yes Elisha Nowak MD   cefUROXime (CEFTIN) 500 mg tablet Take 1 Tab by mouth two (2) times a day for 7 days. 10/23/20 10/30/20 Yes Elisha Nowak MD   levETIRAcetam (Keppra) 500 mg tablet Take 1 Tab by mouth two (2) times a day. 10/23/20  Yes Elisha Nowak MD   acetaminophen (TYLENOL) 650 mg TbER Take 650 mg by mouth every six (6) hours as needed for Pain. Provider, Historical   calcium carbonate (OS-GIN) 500 mg calcium (1,250 mg) tablet Take 2 Tabs by mouth every six (6) hours as needed for Indigestion. Provider, Historical   desmopressin (DDAVP) 0.1 mg tablet Take 0.1 mg by mouth two (2) times a day. Indications: partial central diabetes insipidus    Provider, Historical   docusate sodium (COLACE) 100 mg capsule Take 100 mg by mouth two (2) times a day. Indications: constipation    Provider, Historical   ferrous sulfate 300 mg (60 mg iron)/5 mL syrup Take  by mouth three (3) times daily. Provider, Historical   lamoTRIgine (LaMICtal) 100 mg tablet Take 100 mg by mouth two (2) times a day.  Indications: convulsive seizures    Provider, Historical   levETIRAcetam (KEPPRA) 100 mg/mL solution Take 10 mg/kg by mouth two (2) times a day. Provider, Historical   Saccharomyces boulardii (Florastor) 250 mg capsule Take 250 mg by mouth two (2) times a day. Provider, Historical   rivaroxaban (Xarelto) 20 mg tab tablet Take 20 mg by mouth daily (with dinner). Indications: treatment to prevent recurrence of a clot in a deep vein    Provider, Historical       REVIEW OF SYSTEMS:     Unable to review as patient is a poor historian         Objective:   VITALS:    Visit Vitals  BP (!) 155/99 (BP 1 Location: Right arm, BP Patient Position: At rest)   Pulse 83   Temp 98.7 °F (37.1 °C)   Resp 20   Ht 5' 4.02\" (1.626 m) Comment: not updated this admit   SpO2 95%   Breastfeeding No   BMI 19.59 kg/m²     Temp (24hrs), Av.5 °F (36.9 °C), Min:98.4 °F (36.9 °C), Max:98.7 °F (37.1 °C)      PHYSICAL EXAM:   General: awake, no acute distress. AMS+  HEENT: EOMI, no Icterus, no Pallor, pupils reactive, mucosa dry, normal inspection of ears and nose, throat clear. Neck: Neck is supple, No JVD, no thyromegaly,   Lungs: breathsounds normal, no respiratory distress on inspection, no rhonchi, no rales,  CVS: heart sounds normal, regular rate and rhythm, no murmurs, no rubs.   GI: soft, nontender, normal BS, no palpable organomegaly,  Extremeties: no clubbing, no cyanosis, no edema,  Neuro: Alert, awake, altered mental status   Probably mental status is at baseline now   skin: normal skin turgor, no skin rashes   Musculoskeletal: no acute joint swellings    LAB DATA REVIEWED:    Recent Results (from the past 24 hour(s))   HGB & HCT    Collection Time: 10/25/20 12:05 AM   Result Value Ref Range    HGB 12.6 11.5 - 16.0 g/dL    HCT 40.3 35.0 - 47.0 %   RENAL FUNCTION PANEL    Collection Time: 10/25/20 12:05 AM   Result Value Ref Range    Sodium 151 (H) 136 - 145 mmol/L    Potassium 3.4 (L) 3.5 - 5.1 mmol/L    Chloride 121 (H) 97 - 108 mmol/L    CO2 25 21 - 32 mmol/L    Anion gap 5 5 - 15 mmol/L Glucose 103 (H) 65 - 100 mg/dL    BUN 12 6 - 20 mg/dL    Creatinine 1.01 0.55 - 1.02 mg/dL    BUN/Creatinine ratio 12 12 - 20      GFR est AA >60 >60 ml/min/1.73m2    GFR est non-AA 54 (L) >60 ml/min/1.73m2    Calcium 8.2 (L) 8.5 - 10.1 mg/dL    Phosphorus 2.1 (L) 2.6 - 4.7 mg/dL    Albumin 2.7 (L) 3.5 - 5.0 g/dL   CBC WITH AUTOMATED DIFF    Collection Time: 10/25/20 12:05 AM   Result Value Ref Range    WBC 4.2 3.6 - 11.0 K/uL    RBC 4.46 3.80 - 5.20 M/uL    HGB 12.6 11.5 - 16.0 g/dL    HCT 40.7 35.0 - 47.0 %    MCV 91.3 80.0 - 99.0 FL    MCH 28.3 26.0 - 34.0 PG    MCHC 31.0 30.0 - 36.5 g/dL    RDW 22.1 (H) 11.5 - 14.5 %    PLATELET 913 (L) 362 - 400 K/uL    NEUTROPHILS 38 32 - 75 %    LYMPHOCYTES 47 12 - 49 %    MONOCYTES 13 5 - 13 %    EOSINOPHILS 2 0 - 7 %    BASOPHILS 0 0 - 1 %    IMMATURE GRANULOCYTES 0 0.0 - 0.5 %    ABS. NEUTROPHILS 1.6 (L) 1.8 - 8.0 K/UL    ABS. LYMPHOCYTES 2.0 0.8 - 3.5 K/UL    ABS. MONOCYTES 0.5 0.0 - 1.0 K/UL    ABS. EOSINOPHILS 0.1 0.0 - 0.4 K/UL    ABS. BASOPHILS 0.0 0.0 - 0.1 K/UL    ABS. IMM. GRANS. 0.0 0.00 - 0.04 K/UL    DF AUTOMATED     METABOLIC PANEL, COMPREHENSIVE    Collection Time: 10/25/20 12:05 AM   Result Value Ref Range    Sodium 150 (H) 136 - 145 mmol/L    Potassium 3.4 (L) 3.5 - 5.1 mmol/L    Chloride 120 (H) 97 - 108 mmol/L    CO2 24 21 - 32 mmol/L    Anion gap 6 5 - 15 mmol/L    Glucose 104 (H) 65 - 100 mg/dL    BUN 12 6 - 20 mg/dL    Creatinine 0.96 0.55 - 1.02 mg/dL    BUN/Creatinine ratio 13 12 - 20      GFR est AA >60 >60 ml/min/1.73m2    GFR est non-AA 57 (L) >60 ml/min/1.73m2    Calcium 8.2 (L) 8.5 - 10.1 mg/dL    Bilirubin, total 0.2 0.2 - 1.0 mg/dL    AST (SGOT) 24 15 - 37 U/L    ALT (SGPT) 15 12 - 78 U/L    Alk. phosphatase 120 (H) 45 - 117 U/L    Protein, total 6.2 (L) 6.4 - 8.2 g/dL    Albumin 2.7 (L) 3.5 - 5.0 g/dL    Globulin 3.5 2.0 - 4.0 g/dL    A-G Ratio 0.8 (L) 1.1 - 2.2          1. Hypernatremia: Admitted with altered mental status.    Initial sodium was high at 163   etiology was likely poor intake . Doubt central diabetes insipidus  Sodium had improved to 146I discontinued DDAVP and D5W  to avoid overcorrection/hyponatremia . not noted to be polyuric . Continue to hold   Currently on 75 mils per hour 1/2NS75 mils per hour. I will change to D5W at 75 mils per hour  I will check  renal panel in am  We will continue to monitor sodium levels      2. ?? History of chronic kidney disease stage III:   patient had fluctuating Renal functions probably related to superimposed dehydration with diabetes insipidus   Creatinine is improved to 0.9  We will continue to monitor renal functions to assess the baseline     3. Altered mental status:   Patient has history of dementia, status post brain aneurysm resection with some residual neurological changes. Current Mental status changes are probably related to dehydration and hyponatremia ,   improving , mental status is probably around baseline now  Continue to monitor clinically    4. Severe hypokalemia  -K is only 3.4 today. S/p 3 doses of po KCl 40 mEq of oral KCl yest  -I will give additional 40 mEq now    5. Renal osteodystrophy: Calcium is stable. Phosphorus is 2.4. S/p replace with phosNaK  pending intact PTH, and vitamin D-25 hydroxyl level.        Signed: Sirena Burrows MD

## 2020-10-25 NOTE — PROGRESS NOTES
General Daily Progress Note          Patient Name:   Zev Lopez       YOB: 1950       Age:  69 y.o. Admit Date: 10/17/2020      Subjective:        alert awake denies any chest pain or shortness of breath           Objective:     Visit Vitals  BP (!) 155/99 (BP 1 Location: Right arm, BP Patient Position: At rest)   Pulse 83   Temp 98.7 °F (37.1 °C)   Resp 20   Ht 5' 4.02\" (1.626 m)   SpO2 95%   Breastfeeding No   BMI 19.59 kg/m²        Recent Results (from the past 24 hour(s))   HGB & HCT    Collection Time: 10/25/20 12:05 AM   Result Value Ref Range    HGB 12.6 11.5 - 16.0 g/dL    HCT 40.3 35.0 - 47.0 %   RENAL FUNCTION PANEL    Collection Time: 10/25/20 12:05 AM   Result Value Ref Range    Sodium 151 (H) 136 - 145 mmol/L    Potassium 3.4 (L) 3.5 - 5.1 mmol/L    Chloride 121 (H) 97 - 108 mmol/L    CO2 25 21 - 32 mmol/L    Anion gap 5 5 - 15 mmol/L    Glucose 103 (H) 65 - 100 mg/dL    BUN 12 6 - 20 mg/dL    Creatinine 1.01 0.55 - 1.02 mg/dL    BUN/Creatinine ratio 12 12 - 20      GFR est AA >60 >60 ml/min/1.73m2    GFR est non-AA 54 (L) >60 ml/min/1.73m2    Calcium 8.2 (L) 8.5 - 10.1 mg/dL    Phosphorus 2.1 (L) 2.6 - 4.7 mg/dL    Albumin 2.7 (L) 3.5 - 5.0 g/dL   CBC WITH AUTOMATED DIFF    Collection Time: 10/25/20 12:05 AM   Result Value Ref Range    WBC 4.2 3.6 - 11.0 K/uL    RBC 4.46 3.80 - 5.20 M/uL    HGB 12.6 11.5 - 16.0 g/dL    HCT 40.7 35.0 - 47.0 %    MCV 91.3 80.0 - 99.0 FL    MCH 28.3 26.0 - 34.0 PG    MCHC 31.0 30.0 - 36.5 g/dL    RDW 22.1 (H) 11.5 - 14.5 %    PLATELET 869 (L) 077 - 400 K/uL    NEUTROPHILS 38 32 - 75 %    LYMPHOCYTES 47 12 - 49 %    MONOCYTES 13 5 - 13 %    EOSINOPHILS 2 0 - 7 %    BASOPHILS 0 0 - 1 %    IMMATURE GRANULOCYTES 0 0.0 - 0.5 %    ABS. NEUTROPHILS 1.6 (L) 1.8 - 8.0 K/UL    ABS. LYMPHOCYTES 2.0 0.8 - 3.5 K/UL    ABS. MONOCYTES 0.5 0.0 - 1.0 K/UL    ABS. EOSINOPHILS 0.1 0.0 - 0.4 K/UL    ABS. BASOPHILS 0.0 0.0 - 0.1 K/UL    ABS. IMM.  GRANS. 0.0 0.00 - 0.04 K/UL    DF AUTOMATED     METABOLIC PANEL, COMPREHENSIVE    Collection Time: 10/25/20 12:05 AM   Result Value Ref Range    Sodium 150 (H) 136 - 145 mmol/L    Potassium 3.4 (L) 3.5 - 5.1 mmol/L    Chloride 120 (H) 97 - 108 mmol/L    CO2 24 21 - 32 mmol/L    Anion gap 6 5 - 15 mmol/L    Glucose 104 (H) 65 - 100 mg/dL    BUN 12 6 - 20 mg/dL    Creatinine 0.96 0.55 - 1.02 mg/dL    BUN/Creatinine ratio 13 12 - 20      GFR est AA >60 >60 ml/min/1.73m2    GFR est non-AA 57 (L) >60 ml/min/1.73m2    Calcium 8.2 (L) 8.5 - 10.1 mg/dL    Bilirubin, total 0.2 0.2 - 1.0 mg/dL    AST (SGOT) 24 15 - 37 U/L    ALT (SGPT) 15 12 - 78 U/L    Alk. phosphatase 120 (H) 45 - 117 U/L    Protein, total 6.2 (L) 6.4 - 8.2 g/dL    Albumin 2.7 (L) 3.5 - 5.0 g/dL    Globulin 3.5 2.0 - 4.0 g/dL    A-G Ratio 0.8 (L) 1.1 - 2.2       [unfilled]      Review of Systems    Constitutional: Negative for chills and fever. HENT: Negative. Eyes: Negative. Respiratory: Negative. Cardiovascular: Negative. Gastrointestinal: Negative for abdominal pain and nausea. Skin: Negative. Neurological: Negative. Physical Exam:      Constitutional: pt is oriented to person, place, and time. HENT:   Head: Normocephalic and atraumatic. Eyes: Pupils are equal, round, and reactive to light. EOM are normal.   Cardiovascular: Normal rate, regular rhythm and normal heart sounds. Pulmonary/Chest: Breath sounds normal. No wheezes. No rales. Exhibits no tenderness. Abdominal: Soft. Bowel sounds are normal. There is no abdominal tenderness. There is no rebound and no guarding. Musculoskeletal: Normal range of motion. Neurological: pt is alert and oriented to person, place, and time. CT HEAD WO CONT   Final Result   IMPRESSION: Postsurgical change similar compared to CT head April 24, 2020. No   intracranial hemorrhage.                  Recent Results (from the past 24 hour(s))   HGB & HCT    Collection Time: 10/25/20 12:05 AM Result Value Ref Range    HGB 12.6 11.5 - 16.0 g/dL    HCT 40.3 35.0 - 47.0 %   RENAL FUNCTION PANEL    Collection Time: 10/25/20 12:05 AM   Result Value Ref Range    Sodium 151 (H) 136 - 145 mmol/L    Potassium 3.4 (L) 3.5 - 5.1 mmol/L    Chloride 121 (H) 97 - 108 mmol/L    CO2 25 21 - 32 mmol/L    Anion gap 5 5 - 15 mmol/L    Glucose 103 (H) 65 - 100 mg/dL    BUN 12 6 - 20 mg/dL    Creatinine 1.01 0.55 - 1.02 mg/dL    BUN/Creatinine ratio 12 12 - 20      GFR est AA >60 >60 ml/min/1.73m2    GFR est non-AA 54 (L) >60 ml/min/1.73m2    Calcium 8.2 (L) 8.5 - 10.1 mg/dL    Phosphorus 2.1 (L) 2.6 - 4.7 mg/dL    Albumin 2.7 (L) 3.5 - 5.0 g/dL   CBC WITH AUTOMATED DIFF    Collection Time: 10/25/20 12:05 AM   Result Value Ref Range    WBC 4.2 3.6 - 11.0 K/uL    RBC 4.46 3.80 - 5.20 M/uL    HGB 12.6 11.5 - 16.0 g/dL    HCT 40.7 35.0 - 47.0 %    MCV 91.3 80.0 - 99.0 FL    MCH 28.3 26.0 - 34.0 PG    MCHC 31.0 30.0 - 36.5 g/dL    RDW 22.1 (H) 11.5 - 14.5 %    PLATELET 199 (L) 940 - 400 K/uL    NEUTROPHILS 38 32 - 75 %    LYMPHOCYTES 47 12 - 49 %    MONOCYTES 13 5 - 13 %    EOSINOPHILS 2 0 - 7 %    BASOPHILS 0 0 - 1 %    IMMATURE GRANULOCYTES 0 0.0 - 0.5 %    ABS. NEUTROPHILS 1.6 (L) 1.8 - 8.0 K/UL    ABS. LYMPHOCYTES 2.0 0.8 - 3.5 K/UL    ABS. MONOCYTES 0.5 0.0 - 1.0 K/UL    ABS. EOSINOPHILS 0.1 0.0 - 0.4 K/UL    ABS. BASOPHILS 0.0 0.0 - 0.1 K/UL    ABS. IMM.  GRANS. 0.0 0.00 - 0.04 K/UL    DF AUTOMATED     METABOLIC PANEL, COMPREHENSIVE    Collection Time: 10/25/20 12:05 AM   Result Value Ref Range    Sodium 150 (H) 136 - 145 mmol/L    Potassium 3.4 (L) 3.5 - 5.1 mmol/L    Chloride 120 (H) 97 - 108 mmol/L    CO2 24 21 - 32 mmol/L    Anion gap 6 5 - 15 mmol/L    Glucose 104 (H) 65 - 100 mg/dL    BUN 12 6 - 20 mg/dL    Creatinine 0.96 0.55 - 1.02 mg/dL    BUN/Creatinine ratio 13 12 - 20      GFR est AA >60 >60 ml/min/1.73m2    GFR est non-AA 57 (L) >60 ml/min/1.73m2    Calcium 8.2 (L) 8.5 - 10.1 mg/dL    Bilirubin, total 0.2 0.2 - 1.0 mg/dL    AST (SGOT) 24 15 - 37 U/L    ALT (SGPT) 15 12 - 78 U/L    Alk. phosphatase 120 (H) 45 - 117 U/L    Protein, total 6.2 (L) 6.4 - 8.2 g/dL    Albumin 2.7 (L) 3.5 - 5.0 g/dL    Globulin 3.5 2.0 - 4.0 g/dL    A-G Ratio 0.8 (L) 1.1 - 2.2         Results     Procedure Component Value Units Date/Time    MRSA SCREEN - PCR (NASAL) [[id number]]  (Abnormal) Collected:  10/18/20 0130    Order Status:  Completed Specimen:  Swab Updated:  10/18/20 0553     MRSA by PCR, Nasal DETECTED        Comment: Results verified, phoned to and read back by  Marilyn Rankin RN @ 0552/W3       CULTURE, URINE [[id number]] Collected:  10/17/20 1215    Order Status:  Completed Specimen:  Urine Updated:  10/19/20 1441     Special Requests: No Special Requests        Culture result: No Growth (<1000 cfu/mL)              Labs:     Recent Labs     10/25/20  0005 10/23/20  1830   WBC 4.2 4.0   HGB 12.6  12.6 15.2   HCT 40.7  40.3 47.3*   * 136*     Recent Labs     10/25/20  0005 10/23/20  1830   *  151* 146*   K 3.4*  3.4* 3.5   *  121* 115*   CO2 24  25 26   BUN 12  12 12   CREA 0.96  1.01 1.00   *  103* 85   CA 8.2*  8.2* 8.8   PHOS 2.1* 2.0*     Recent Labs     10/25/20  0005 10/23/20  1830   ALT 15  --    *  --    TBILI 0.2  --    TP 6.2*  --    ALB 2.7*  2.7* 3.2*   GLOB 3.5  --      No results for input(s): INR, PTP, APTT, INREXT, INREXT in the last 72 hours. No results for input(s): FE, TIBC, PSAT, FERR in the last 72 hours. No results found for: FOL, RBCF   No results for input(s): PH, PCO2, PO2 in the last 72 hours. No results for input(s): CPK, CKNDX, TROIQ in the last 72 hours.     No lab exists for component: CPKMB  No results found for: CHOL, CHOLX, CHLST, CHOLV, HDL, HDLP, LDL, LDLC, DLDLP, TGLX, TRIGL, TRIGP, CHHD, CHHDX  Lab Results   Component Value Date/Time    Glucose (POC) 141 (H) 10/23/2020 07:23 AM    Glucose (POC) 338 (H) 10/22/2020 04:51 AM    Glucose (POC) 106 (H) 10/20/2020 10:01 AM     Lab Results   Component Value Date/Time    Color Yellow/Straw 10/17/2020 01:42 AM    Appearance Clear 10/17/2020 01:42 AM    Specific gravity 1.027 10/17/2020 01:42 AM    Specific gravity 1.012 11/30/2013 04:50 AM    pH (UA) 5.0 10/17/2020 01:42 AM    Protein Negative 10/17/2020 01:42 AM    Glucose Negative 10/17/2020 01:42 AM    Ketone Negative 10/17/2020 01:42 AM    Bilirubin Negative 10/17/2020 01:42 AM    Urobilinogen 0.1 10/17/2020 01:42 AM    Nitrites Negative 10/17/2020 01:42 AM    Leukocyte Esterase Negative 10/17/2020 01:42 AM    Epithelial cells FEW 11/30/2013 04:50 AM    Bacteria Negative 10/17/2020 01:42 AM    WBC 0-4 10/17/2020 01:42 AM    RBC 0-5 10/17/2020 01:42 AM         Assessment:     Metabolic encephalopathy  Hypernatremia  UTI  History of SIADH  Dementia  Static post PEG tube        Plan:       Sodium level still elevated  Follow-up with nephrologist  Repeat the lab      Current Facility-Administered Medications:     dextrose 5% infusion, 75 mL/hr, IntraVENous, CONTINUOUS, Sathish Bates MD    potassium chloride (K-DUR, KLOR-CON) SR tablet 40 mEq, 40 mEq, Oral, NOW, Sathish Bates MD    phosphorus (K PHOS NEUTRAL) 250 mg tablet 1 Tab, 1 Tab, Oral, BID, Sathish Bates MD  Donna Martinez  [START ON 10/26/2020] amLODIPine (NORVASC) tablet 5 mg, 5 mg, Oral, DAILY, Sathish Bates MD    levETIRAcetam (KEPPRA) 750 mg in 0.9% sodium chloride 100 mL IVPB, 750 mg, IntraVENous, Q12H, AbimaellpTamara Land MD, 750 mg at 10/25/20 1217    LORazepam (ATIVAN) injection 1 mg, 1 mg, IntraVENous, Q6H PRN, Parvin SMITH MD, 1 mg at 10/25/20 0325    mupirocin (BACTROBAN) 2 % ointment, , Topical, BID, Randall Hernandes MD    acetaminophen (TYLENOL) tablet 650 mg, 650 mg, Oral, Q6H PRN, Rafy Aj MD    cefTRIAXone (ROCEPHIN) 1 g in 0.9% sodium chloride (MBP/ADV) 50 mL MBP, 1 g, IntraVENous, Q24H, Randall Hernandes MD, Last Rate: 100 mL/hr at 10/25/20 1336, 1 g at 10/25/20 1336    sodium chloride (NS) flush 5-40 mL, 5-40 mL, IntraVENous, Q8H, Randall Hernandes MD, 10 mL at 10/25/20 1344    sodium chloride (NS) flush 5-40 mL, 5-40 mL, IntraVENous, PRN, Randall Hernandes MD    docusate sodium (COLACE) capsule 100 mg, 100 mg, Oral, BID, Zaheer SMITH MD, 100 mg at 10/25/20 5169

## 2020-10-26 PROCEDURE — 74011250637 HC RX REV CODE- 250/637: Performed by: INTERNAL MEDICINE

## 2020-10-26 PROCEDURE — 74011250636 HC RX REV CODE- 250/636: Performed by: INTERNAL MEDICINE

## 2020-10-26 PROCEDURE — 97530 THERAPEUTIC ACTIVITIES: CPT

## 2020-10-26 PROCEDURE — 74011000258 HC RX REV CODE- 258: Performed by: INTERNAL MEDICINE

## 2020-10-26 PROCEDURE — 65270000029 HC RM PRIVATE

## 2020-10-26 RX ORDER — CEFUROXIME AXETIL 250 MG/1
250 TABLET ORAL EVERY 12 HOURS
Status: DISCONTINUED | OUTPATIENT
Start: 2020-10-26 | End: 2020-10-26

## 2020-10-26 RX ORDER — CEFUROXIME AXETIL 250 MG/1
250 TABLET ORAL EVERY 12 HOURS
Status: DISCONTINUED | OUTPATIENT
Start: 2020-10-26 | End: 2020-10-28 | Stop reason: HOSPADM

## 2020-10-26 RX ORDER — LORAZEPAM 2 MG/ML
1 INJECTION INTRAMUSCULAR
Status: DISCONTINUED | OUTPATIENT
Start: 2020-10-26 | End: 2020-10-28 | Stop reason: HOSPADM

## 2020-10-26 RX ADMIN — DIBASIC SODIUM PHOSPHATE, MONOBASIC POTASSIUM PHOSPHATE AND MONOBASIC SODIUM PHOSPHATE 1 TABLET: 852; 155; 130 TABLET ORAL at 09:00

## 2020-10-26 RX ADMIN — CEFTRIAXONE SODIUM 1 G: 1 INJECTION, POWDER, FOR SOLUTION INTRAMUSCULAR; INTRAVENOUS at 12:39

## 2020-10-26 RX ADMIN — LEVETIRACETAM 750 MG: 250 TABLET, FILM COATED ORAL at 21:16

## 2020-10-26 RX ADMIN — DOCUSATE SODIUM 100 MG: 100 CAPSULE, LIQUID FILLED ORAL at 21:16

## 2020-10-26 RX ADMIN — AMLODIPINE BESYLATE 5 MG: 5 TABLET ORAL at 09:44

## 2020-10-26 RX ADMIN — Medication 10 ML: at 06:48

## 2020-10-26 RX ADMIN — DIBASIC SODIUM PHOSPHATE, MONOBASIC POTASSIUM PHOSPHATE AND MONOBASIC SODIUM PHOSPHATE 1 TABLET: 852; 155; 130 TABLET ORAL at 21:17

## 2020-10-26 RX ADMIN — CEFUROXIME AXETIL 250 MG: 250 TABLET ORAL at 21:17

## 2020-10-26 RX ADMIN — DEXTROSE MONOHYDRATE 75 ML/HR: 50 INJECTION, SOLUTION INTRAVENOUS at 01:00

## 2020-10-26 NOTE — PROGRESS NOTES
Pulmonary Progress Note    Subjective:   Daily Progress Note: 10/26/2020 3:29 PM    CC: 14-year-old patient with severe hypernatremia, seizure on IV hypotonic solution  With sodium of 150 denies any blurring of vision denies any chest pain no shortness of breath  HPI:      Review of Systems  A comprehensive review of systems was negative except for that written in the HPI. Objective:     Visit Vitals  BP (!) 132/94 (BP 1 Location: Left arm)   Pulse 80   Temp 98.3 °F (36.8 °C)   Resp 18   Ht 5' 4.02\" (1.626 m) Comment: not updated this admit   SpO2 93%   Breastfeeding No   BMI 19.59 kg/m²      O2 Device: Room air    Temp (24hrs), Av.4 °F (36.9 °C), Min:98.1 °F (36.7 °C), Max:98.8 °F (37.1 °C)      No intake/output data recorded. 10/24 1901 - 10/26 0700  In: 100 [I.V.:100]  Out: 1 [Urine:1]    Visit Vitals  BP (!) 132/94 (BP 1 Location: Left arm)   Pulse 80   Temp 98.3 °F (36.8 °C)   Resp 18   Ht 5' 4.02\" (1.626 m) Comment: not updated this admit   SpO2 93%   Breastfeeding No   BMI 19.59 kg/m²     General:  Alert, cooperative, no distress, appears stated age. Head:  Normocephalic, without obvious abnormality, atraumatic. Eyes:  Conjunctivae/corneas clear. PERRL, EOMs intact. Fundi benign. Ears:  Normal TMs and external ear canals both ears. Nose: Nares normal. Septum midline. Mucosa normal. No drainage or sinus tenderness. Throat: Lips, mucosa, and tongue normal. Teeth and gums normal.   Neck: Supple, symmetrical, trachea midline, no adenopathy, thyroid: no enlargement/tenderness/nodules, no carotid bruit and no JVD. Back:   Symmetric, no curvature. ROM normal. No CVA tenderness. Lungs:   Clear to auscultation bilaterally. Chest wall:  No tenderness or deformity. Heart:  Regular rate and rhythm, S1, S2 normal, no murmur, click, rub or gallop. Breast Exam:  No tenderness, masses, or nipple abnormality. Abdomen:   Soft, non-tender. Bowel sounds normal. No masses,  No organomegaly. Genitalia:  Normal female without lesion, discharge or tenderness. Rectal:  Normal tone,  no masses or tenderness  Guaiac negative stool. Extremities: Extremities normal, atraumatic, no cyanosis or edema. Pulses: 2+ and symmetric all extremities. Skin: Skin color, texture, turgor normal. No rashes or lesions. Lymph nodes: Cervical, supraclavicular, and axillary nodes normal.   Neurologic: CNII-XII intact. Normal strength, sensation and reflexes throughout. Data Review    No results found for this or any previous visit (from the past 24 hour(s)). Current Facility-Administered Medications   Medication Dose Route Frequency    dextrose 5% infusion  75 mL/hr IntraVENous CONTINUOUS    phosphorus (K PHOS NEUTRAL) 250 mg tablet 1 Tab  1 Tab Oral BID    amLODIPine (NORVASC) tablet 5 mg  5 mg Oral DAILY    levETIRAcetam (KEPPRA) 750 mg in 0.9% sodium chloride 100 mL IVPB  750 mg IntraVENous Q12H    LORazepam (ATIVAN) injection 1 mg  1 mg IntraVENous Q6H PRN    mupirocin (BACTROBAN) 2 % ointment   Topical BID    acetaminophen (TYLENOL) tablet 650 mg  650 mg Oral Q6H PRN    cefTRIAXone (ROCEPHIN) 1 g in 0.9% sodium chloride (MBP/ADV) 50 mL MBP  1 g IntraVENous Q24H    sodium chloride (NS) flush 5-40 mL  5-40 mL IntraVENous Q8H    sodium chloride (NS) flush 5-40 mL  5-40 mL IntraVENous PRN    docusate sodium (COLACE) capsule 100 mg  100 mg Oral BID     No results found for this or any previous visit (from the past 24 hour(s)). Assessment/Plan:     Active Problems:    Hypernatremia (10/17/2020)      UTI (urinary tract infection) (10/17/2020)      Possible seizures patient is on medications Keppra   Nephrology notes reviewed recommends continued IV hypotonic solution with review of electrolytes in a.m. Total time spent with patient: 60 minutes.

## 2020-10-26 NOTE — PROGRESS NOTES
Patient transferred to [place] [address] to room 588. SBAR report given via phone to  [facility]. Report gone over and all questions answered. Patient will be transported with all belongings, staff, and tele in place. Patient in stable condition and no pain.

## 2020-10-26 NOTE — PROGRESS NOTES
OCCUPATIONAL THERAPY TREATMENT  Patient: Zev Lopez (59 y.o. female)  Date: 10/26/2020  Diagnosis: Hypernatremia [E87.0]  UTI (urinary tract infection) [N39.0]  Hypernatremia [E87.0]   <principal problem not specified>       Precautions:  seizure  Chart, occupational therapy assessment, plan of care, and goals were reviewed. ASSESSMENT  Patient continues with skilled OT services and is progressing towards goals. Pt seen this pm for OT. Pt awake, alert, very pleasant. Oxname, bday, place but not date. Pt thought it  was August and could not believe it was October already. As talking w/ pt, observed pt to have eye twitching, fluttering, but pt able to speak. Noted this to happen about 3 times lasting about 2-3 seconds each time. Informed RN. For safety worked w/pt from bed for brief session. Pt washed face, hands,applied lotion IND after supplies gathered for her. Pt  completed x10 reps BUE AROM all joints. Pt saying LUE  \"tender and weak\". Educated pt on benefits of continuing to move, exercise LUE to help increase strength. Other factors to consider for discharge: ?seizure activity         PLAN :  Patient continues to benefit from skilled intervention to address the above impairments. Continue treatment per established plan of care. to address goals. Recommend with staff: RICARDO DALEY, UB ADL from bed level    Recommend next OT session: if appropriate pending seizure status, cont w/OOB functional mob and ADL, o/w bed level activity    Recommendation for discharge: (in order for the patient to meet his/her long term goals)  Therapy up to 5 days/week in SNF setting    This discharge recommendation:  Has been made in collaboration with the attending provider and/or case management           SUBJECTIVE:   Patient stated That feels good.  (RICARDO DALEY)    OBJECTIVE DATA SUMMARY:   Cognitive/Behavioral Status:  Neurologic State: Alert  Orientation Level: Oriented to person;Oriented to place; Disoriented to time  Cognition: Follows commands              ADL Intervention:       Grooming  Grooming Assistance: Set-up  Position Performed: Long sitting on bed  Washing Face: Independent  Washing Hands: Independent            Therapeutic Exercises:   BUE AROM [id number] w/min cues    Pain:  None reported. Activity Tolerance:   Good  Please refer to the flowsheet for vital signs taken during this treatment. After treatment patient left in no apparent distress:   Supine in bed, Call bell within reach and Bed / chair alarm activated    COMMUNICATION/COLLABORATION:   The patients plan of care was discussed with: Registered nurseMary Nunez  Time Calculation: 16 mins    Problem: Self Care Deficits Care Plan (Adult)  Goal: *Acute Goals and Plan of Care (Insert Text)  Description: Pt will be Mod I sup <> sit in prep for EOB ADLs  Pt will be SBA grooming standing sink side LRAD.   Pt will be SBA LE dressing sitting EOB/long sit  Pt will be SBA sit <>  prep for toileting LRAD  Pt will be SBA toileting/toilet transfer/cloth mgmt LRAD  Pt will be IND following UE HEP in prep for self care tasks     Outcome: Progressing Towards Goal     Problem: Patient Education: Go to Patient Education Activity  Goal: Patient/Family Education  Outcome: Progressing Towards Goal

## 2020-10-26 NOTE — ROUTINE PROCESS
Patient transfer from [facility]. She is A x 1 bed alarm on yellow socks on yellow arm band . The transfer skin assessment was done by Esteban Guan and Selena Thornton. She has bruise to left inner arm, and moisture associated skin breakdown under both breast right  More than left. She was cleaned up and pure wick applied.

## 2020-10-26 NOTE — PROGRESS NOTES
NEURO PROGRESS NOTE        SUBJECTIVE:   No status changes, UTI, electrolyte abnormalities, encephalopathy      EXAM:  , Oriented to location. Nonaphasic. Responds more readily this morning. Follows commands more readily this morning. Diffuse weakness improving. ASSESSMENT/PLAN:    Assessment/plan: Mental status improving with underlying encephalopathy. ALLERGIES:    No Known Allergies    MEDS:      Current Facility-Administered Medications:     dextrose 5% infusion, 75 mL/hr, IntraVENous, CONTINUOUS, Pilo Starr MD, Last Rate: 75 mL/hr at 10/26/20 0100, 75 mL/hr at 10/26/20 0100    phosphorus (K PHOS NEUTRAL) 250 mg tablet 1 Tab, 1 Tab, Oral, BID, Pilo Starr MD, 1 Tab at 10/25/20 2328    amLODIPine (NORVASC) tablet 5 mg, 5 mg, Oral, DAILY, Pilo Starr MD    levETIRAcetam (KEPPRA) 750 mg in 0.9% sodium chloride 100 mL IVPB, 750 mg, IntraVENous, Q12H, Jose Hassan MD, 750 mg at 10/25/20 2326    LORazepam (ATIVAN) injection 1 mg, 1 mg, IntraVENous, Q6H PRN, Bibi SMITH MD, 1 mg at 10/25/20 0325    mupirocin (BACTROBAN) 2 % ointment, , Topical, BID, Randall Hernandes MD    acetaminophen (TYLENOL) tablet 650 mg, 650 mg, Oral, Q6H PRN, Chloe Hahn MD    cefTRIAXone (ROCEPHIN) 1 g in 0.9% sodium chloride (MBP/ADV) 50 mL MBP, 1 g, IntraVENous, Q24H, Randall Hernandes MD, Last Rate: 100 mL/hr at 10/25/20 1336, 1 g at 10/25/20 1336    sodium chloride (NS) flush 5-40 mL, 5-40 mL, IntraVENous, Q8H, Randall Hernandes MD, 10 mL at 10/26/20 6177    sodium chloride (NS) flush 5-40 mL, 5-40 mL, IntraVENous, PRN, Randall Giraldo MD    docusate sodium (COLACE) capsule 100 mg, 100 mg, Oral, BID, Randall eHrnandes MD, 100 mg at 10/25/20 0919    LABS:  No results found for this or any previous visit (from the past 24 hour(s)).     Visit Vitals  BP (!) 132/94 (BP 1 Location: Left arm)   Pulse 80   Temp 98.3 °F (36.8 °C)   Resp 18   Ht 5' 4.02\" (1.626 m)   SpO2 93%   Breastfeeding No   BMI 19.59 kg/m²       Imaging:  CT HEAD WO CONT   Final Result   IMPRESSION: Postsurgical change similar compared to CT head April 24, 2020. No   intracranial hemorrhage.

## 2020-10-26 NOTE — PROGRESS NOTES
PHYSICAL THERAPY TREATMENT  Patient: Donita Weiss (32 y.o. female)  Date: 10/26/2020  Diagnosis: Hypernatremia [E87.0]  UTI (urinary tract infection) [N39.0]  Hypernatremia [E87.0]   <principal problem not specified>       Precautions:    Chart, physical therapy assessment, plan of care and goals were reviewed. ASSESSMENT  Patient continues with skilled PT services and is progressing towards goals. Pt. Pleasant and agreeable to rehab session. Worked on BLE TE for 20 repetitions. Pt. Complained of some minor hip discomfort. Pt. Had 3 mini mal seizures totaling 5 seconds, 5 seconds, and 30 seconds. Held bed mobility and transfers due to seizure episodes and unpredictability of occurrence. Recommend DC to snf. Left patient supine in bed with callbell and all needs met. SN notified of episodes    Current Level of Function Impacting Discharge (mobility/balance): seizure activity    Other factors to consider for discharge: PMH         PLAN :  Patient continues to benefit from skilled intervention to address the above impairments. Continue treatment per established plan of care. to address goals.     Recommendation for discharge: (in order for the patient to meet his/her long term goals)  Therapy up to 5 days/week in SNF setting    This discharge recommendation:  Has been made in collaboration with the attending provider and/or case management    IF patient discharges home will need the following DME: to be determined (TBD)       SUBJECTIVE:   Patient stated IM DOING WELL HOW ABOUT YOU.    OBJECTIVE DATA SUMMARY:   Critical Behavior:  Neurologic State: Alert  Orientation Level: Oriented X4  Cognition: Follows commands  Safety/Judgement: Decreased insight into deficits, Decreased awareness of need for safety  Functional Mobility Training:  Held due to episodes    Therapeutic Exercises:   Therapeutic Exercises:       EXERCISE   Sets   Reps   Active Active Assist   Passive Self ROM   Comments   Ankle Pumps  20 [x] [] [] []    Quad Sets/Glut Sets   [] [] [] []    Hamstring Sets   [] [] [] []    Short Arc Quads   [] [] [] []    Heel Slides  20 [x] [] [] []    Straight Leg Raises   [] [] [] []    Hip abd/add  20 [x] [] [] []    Long Arc Quads   [] [] [] []    Marching   [] [] [] []       [] [] [] []        Pain Ratin/10    Activity Tolerance:   Fair  Please refer to the flowsheet for vital signs taken during this treatment. After treatment patient left in no apparent distress:   Supine in bed    COMMUNICATION/COLLABORATION:   The patients plan of care was discussed with: Physical therapy assistant.      Giovanni Moon   Time Calculation: 23 mins

## 2020-10-26 NOTE — PROGRESS NOTES
NAME:  Pino Jose   :   1950   MRN:   [id number]     ATTENDING: Miladys Ortiz MD  PCP:  Diana Cristobal MD    Date/Time:  10/26/2020 10:19 PM      Subjective:   REQUESTING PHYSICIAN: Dr. Eddy Chol:    hypernatremia    Patient seen at bedside, seems to be in no distress  Pt is awake  Looks comfortable  On IVF  Na 150   Labs are pending today      Social History     Tobacco Use    Smoking status: Not on file   Substance Use Topics    Alcohol use: Not on file      No family history on file. No Known Allergies   Prior to Admission medications    Medication Sig Start Date End Date Taking? Authorizing Provider   docusate sodium (COLACE) 100 mg capsule Take 1 Cap by mouth two (2) times a day for 90 days. 10/23/20 1/21/21 Yes Flor Calderon MD   cefUROXime (CEFTIN) 500 mg tablet Take 1 Tab by mouth two (2) times a day for 7 days. 10/23/20 10/30/20 Yes Flor Calderon MD   levETIRAcetam (Keppra) 500 mg tablet Take 1 Tab by mouth two (2) times a day. 10/23/20  Yes Flor Calderon MD   acetaminophen (TYLENOL) 650 mg TbER Take 650 mg by mouth every six (6) hours as needed for Pain. Provider, Historical   calcium carbonate (OS-GIN) 500 mg calcium (1,250 mg) tablet Take 2 Tabs by mouth every six (6) hours as needed for Indigestion. Provider, Historical   desmopressin (DDAVP) 0.1 mg tablet Take 0.1 mg by mouth two (2) times a day. Indications: partial central diabetes insipidus    Provider, Historical   docusate sodium (COLACE) 100 mg capsule Take 100 mg by mouth two (2) times a day. Indications: constipation    Provider, Historical   ferrous sulfate 300 mg (60 mg iron)/5 mL syrup Take  by mouth three (3) times daily. Provider, Historical   lamoTRIgine (LaMICtal) 100 mg tablet Take 100 mg by mouth two (2) times a day. Indications: convulsive seizures    Provider, Historical   levETIRAcetam (KEPPRA) 100 mg/mL solution Take 10 mg/kg by mouth two (2) times a day.     Provider, Historical   Saccharomyces boulardii (Florastor) 250 mg capsule Take 250 mg by mouth two (2) times a day. Provider, Historical   rivaroxaban (Xarelto) 20 mg tab tablet Take 20 mg by mouth daily (with dinner). Indications: treatment to prevent recurrence of a clot in a deep vein    Provider, Historical       REVIEW OF SYSTEMS:     Unable to review as patient is a poor historian         Objective:   VITALS:    Visit Vitals  BP (!) 132/94 (BP 1 Location: Left arm)   Pulse 80   Temp 98.3 °F (36.8 °C)   Resp 18   Ht 5' 4.02\" (1.626 m) Comment: not updated this admit   SpO2 93%   Breastfeeding No   BMI 19.59 kg/m²     Temp (24hrs), Av.4 °F (36.9 °C), Min:98.1 °F (36.7 °C), Max:98.8 °F (37.1 °C)      PHYSICAL EXAM:   General: awake, no acute distress. AMS+  HEENT: EOMI, no Icterus, no Pallor, pupils reactive, mucosa dry, normal inspection of ears and nose, throat clear. Neck: Neck is supple, No JVD, no thyromegaly,   Lungs: breathsounds normal, no respiratory distress on inspection, no rhonchi, no rales,  CVS: heart sounds normal, regular rate and rhythm, no murmurs, no rubs. GI: soft, nontender, normal BS, no palpable organomegaly,  Extremeties: no clubbing, no cyanosis, no edema,  Neuro: Alert, awake, altered mental status   Probably mental status is at baseline now   skin: normal skin turgor, no skin rashes   Musculoskeletal: no acute joint swellings    LAB DATA REVIEWED:    No results found for this or any previous visit (from the past 24 hour(s)). 1. Hypernatremia:   Admitted with altered mental status. Initial sodium was high at 163   etiology was likely poor intake . Doubt central diabetes insipidus  Sodium had improved to 150  10  Labs are pending   I will keep on D5W at 75 mils per hour  I will check  renal panel in am    2. ??   History of chronic kidney disease stage III:   patient had fluctuating Renal functions probably related to superimposed dehydration with diabetes insipidus   Creatinine is improved to 0.9  We will continue to monitor renal functions to assess the baseline     3. Altered mental status:   Patient has history of dementia, status post brain aneurysm resection with some residual neurological changes. Current Mental status changes are probably related to dehydration and hyponatremia ,   improving , mental status is probably around baseline now  Continue to monitor clinically    4. Severe hypokalemia  -K is only 3.4 today. S/p 3 doses of po KCl 40 mEq of oral KCl yest    5. Renal osteodystrophy: Calcium is stable. Phosphorus is 2.4. S/p replace with phosNaK  pending intact PTH, and vitamin D-25 hydroxyl level.        Signed: Tasia Cardona MD

## 2020-10-26 NOTE — PROGRESS NOTES
Patient continues to have a bed available to return to [facility]. She will still go to Room [facility] and nursing report can be called to [phone]. DC information sent to [facility]. Notified patient's , Shonda Urrutia, at [phone] that patient will most likely be discharged today. He had no concerns. IMM provided verbally via phone.

## 2020-10-27 LAB
ALBUMIN SERPL-MCNC: 2.6 G/DL (ref 3.5–5)
ANION GAP SERPL CALC-SCNC: 4 MMOL/L (ref 5–15)
BUN SERPL-MCNC: 10 MG/DL (ref 6–20)
BUN/CREAT SERPL: 11 (ref 12–20)
CA-I BLD-MCNC: 8.6 MG/DL (ref 8.5–10.1)
CHLORIDE SERPL-SCNC: 112 MMOL/L (ref 97–108)
CO2 SERPL-SCNC: 21 MMOL/L (ref 21–32)
CREAT SERPL-MCNC: 0.91 MG/DL (ref 0.55–1.02)
GLUCOSE SERPL-MCNC: 86 MG/DL (ref 65–100)
PHOSPHATE SERPL-MCNC: 2.2 MG/DL (ref 2.6–4.7)
POTASSIUM SERPL-SCNC: 4.4 MMOL/L (ref 3.5–5.1)
SODIUM SERPL-SCNC: 137 MMOL/L (ref 136–145)

## 2020-10-27 PROCEDURE — 80069 RENAL FUNCTION PANEL: CPT

## 2020-10-27 PROCEDURE — 65270000029 HC RM PRIVATE

## 2020-10-27 PROCEDURE — 36415 COLL VENOUS BLD VENIPUNCTURE: CPT

## 2020-10-27 PROCEDURE — 74011250637 HC RX REV CODE- 250/637: Performed by: INTERNAL MEDICINE

## 2020-10-27 RX ADMIN — MUPIROCIN: 20 OINTMENT TOPICAL at 12:42

## 2020-10-27 RX ADMIN — CEFUROXIME AXETIL 250 MG: 250 TABLET ORAL at 08:40

## 2020-10-27 RX ADMIN — CEFUROXIME AXETIL 250 MG: 250 TABLET ORAL at 19:46

## 2020-10-27 RX ADMIN — LEVETIRACETAM 750 MG: 250 TABLET, FILM COATED ORAL at 08:40

## 2020-10-27 RX ADMIN — DOCUSATE SODIUM 100 MG: 100 CAPSULE, LIQUID FILLED ORAL at 19:45

## 2020-10-27 RX ADMIN — LEVETIRACETAM 750 MG: 250 TABLET, FILM COATED ORAL at 19:45

## 2020-10-27 RX ADMIN — DIBASIC SODIUM PHOSPHATE, MONOBASIC POTASSIUM PHOSPHATE AND MONOBASIC SODIUM PHOSPHATE 1 TABLET: 852; 155; 130 TABLET ORAL at 08:40

## 2020-10-27 RX ADMIN — AMLODIPINE BESYLATE 5 MG: 5 TABLET ORAL at 08:40

## 2020-10-27 RX ADMIN — MUPIROCIN: 20 OINTMENT TOPICAL at 19:46

## 2020-10-27 NOTE — PROGRESS NOTES
Progress Note    Subjective:   Daily Progress Note: 10/27/2020 5:42 PM    CC:    HPI: 69-year-old patient with acute on chronic hyponatremia possible history of seizure with increase in sodium level. Patient is on IV hypotonic solutions      Review of Systems  A comprehensive review of systems was negative except for that written in the HPI. Objective:     Visit Vitals  /81   Pulse 86   Temp 98.3 °F (36.8 °C)   Resp 17   Ht 5' 4.02\" (1.626 m) Comment: not updated this admit   SpO2 94%   Breastfeeding No   BMI 19.59 kg/m²      O2 Device: Room air    Temp (24hrs), Av.4 °F (36.9 °C), Min:98.2 °F (36.8 °C), Max:98.6 °F (37 °C)      No intake/output data recorded. No intake/output data recorded. Visit Vitals  /81   Pulse 86   Temp 98.3 °F (36.8 °C)   Resp 17   Ht 5' 4.02\" (1.626 m) Comment: not updated this admit   SpO2 94%   Breastfeeding No   BMI 19.59 kg/m²     General:  Alert, cooperative, no distress, appears stated age. Head:  Normocephalic, without obvious abnormality, atraumatic. Eyes:  Conjunctivae/corneas clear. PERRL, EOMs intact. Fundi benign. Ears:  Normal TMs and external ear canals both ears. Nose: Nares normal. Septum midline. Mucosa normal. No drainage or sinus tenderness. Throat: Lips, mucosa, and tongue normal. Teeth and gums normal.   Neck: Supple, symmetrical, trachea midline, no adenopathy, thyroid: no enlargement/tenderness/nodules, no carotid bruit and no JVD. Back:   Symmetric, no curvature. ROM normal. No CVA tenderness. Lungs:   Clear to auscultation bilaterally. Chest wall:  No tenderness or deformity. Heart:  Regular rate and rhythm, S1, S2 normal, no murmur, click, rub or gallop. Breast Exam:  No tenderness, masses, or nipple abnormality. Abdomen:   Soft, non-tender. Bowel sounds normal. No masses,  No organomegaly. Genitalia:  Normal female without lesion, discharge or tenderness.    Rectal:  Normal tone,  no masses or tenderness  Guaiac negative stool. Extremities: Extremities normal, atraumatic, no cyanosis or edema. Pulses: 2+ and symmetric all extremities. Skin: Skin color, texture, turgor normal. No rashes or lesions. Lymph nodes: Cervical, supraclavicular, and axillary nodes normal.   Neurologic: CNII-XII intact. Diminished  strength, sensation and reflexes throughout. Data Review    Recent Results (from the past 24 hour(s))   RENAL FUNCTION PANEL    Collection Time: 10/27/20 12:00 PM   Result Value Ref Range    Sodium 137 136 - 145 mmol/L    Potassium 4.4 3.5 - 5.1 mmol/L    Chloride 112 (H) 97 - 108 mmol/L    CO2 21 21 - 32 mmol/L    Anion gap 4 (L) 5 - 15 mmol/L    Glucose 86 65 - 100 mg/dL    BUN 10 6 - 20 mg/dL    Creatinine 0.91 0.55 - 1.02 mg/dL    BUN/Creatinine ratio 11 (L) 12 - 20      GFR est AA >60 >60 ml/min/1.73m2    GFR est non-AA >60 >60 ml/min/1.73m2    Calcium 8.6 8.5 - 10.1 mg/dL    Phosphorus 2.2 (L) 2.6 - 4.7 mg/dL    Albumin 2.6 (L) 3.5 - 5.0 g/dL       Current Facility-Administered Medications   Medication Dose Route Frequency    levETIRAcetam (KEPPRA) tablet 750 mg  750 mg Oral BID    LORazepam (ATIVAN) injection 1 mg  1 mg IntraMUSCular Q6H PRN    cefUROXime (CEFTIN) tablet 250 mg  250 mg Oral Q12H    dextrose 5% infusion  75 mL/hr IntraVENous CONTINUOUS    amLODIPine (NORVASC) tablet 5 mg  5 mg Oral DAILY    mupirocin (BACTROBAN) 2 % ointment   Topical BID    acetaminophen (TYLENOL) tablet 650 mg  650 mg Oral Q6H PRN    sodium chloride (NS) flush 5-40 mL  5-40 mL IntraVENous PRN    docusate sodium (COLACE) capsule 100 mg  100 mg Oral BID         Assessment/Plan:     Active Problems:    Hypernatremia (10/17/2020)      UTI (urinary tract infection) (10/17/2020)    Acute on chronic hyponatremia continue with IV dextrose  Declining function          Total time spent with patient: 35 minutes.

## 2020-10-27 NOTE — PROGRESS NOTES
NAME:  Bill Dutta   :   1950   MRN:   [id number]     ATTENDING: Brando Pathak MD  PCP:  Ivonne Barrios MD    Date/Time:  10/27/2020 10:19 PM      Subjective:   REQUESTING PHYSICIAN: Dr. Bakari Argueta:    hypernatremia    Patient seen at bedside, seems to be in no distress  Pt is awake  Looks comfortable  On IVF  Na 150   Labs are pending today      Social History     Tobacco Use    Smoking status: Not on file   Substance Use Topics    Alcohol use: Not on file      No family history on file. No Known Allergies   Prior to Admission medications    Medication Sig Start Date End Date Taking? Authorizing Provider   docusate sodium (COLACE) 100 mg capsule Take 1 Cap by mouth two (2) times a day for 90 days. 10/23/20 1/21/21 Yes Naheed Caldera MD   cefUROXime (CEFTIN) 500 mg tablet Take 1 Tab by mouth two (2) times a day for 7 days. 10/23/20 10/30/20 Yes Naheed Caldera MD   levETIRAcetam (Keppra) 500 mg tablet Take 1 Tab by mouth two (2) times a day. 10/23/20  Yes Naheed Caldera MD   acetaminophen (TYLENOL) 650 mg TbER Take 650 mg by mouth every six (6) hours as needed for Pain. Provider, Historical   calcium carbonate (OS-GIN) 500 mg calcium (1,250 mg) tablet Take 2 Tabs by mouth every six (6) hours as needed for Indigestion. Provider, Historical   desmopressin (DDAVP) 0.1 mg tablet Take 0.1 mg by mouth two (2) times a day. Indications: partial central diabetes insipidus    Provider, Historical   docusate sodium (COLACE) 100 mg capsule Take 100 mg by mouth two (2) times a day. Indications: constipation    Provider, Historical   ferrous sulfate 300 mg (60 mg iron)/5 mL syrup Take  by mouth three (3) times daily. Provider, Historical   lamoTRIgine (LaMICtal) 100 mg tablet Take 100 mg by mouth two (2) times a day. Indications: convulsive seizures    Provider, Historical   levETIRAcetam (KEPPRA) 100 mg/mL solution Take 10 mg/kg by mouth two (2) times a day.     Provider, Historical   Saccharomyces boulardii (Florastor) 250 mg capsule Take 250 mg by mouth two (2) times a day. Provider, Historical   rivaroxaban (Xarelto) 20 mg tab tablet Take 20 mg by mouth daily (with dinner). Indications: treatment to prevent recurrence of a clot in a deep vein    Provider, Historical       REVIEW OF SYSTEMS:     Unable to review as patient is a poor historian         Objective:   VITALS:    Visit Vitals  /81   Pulse 86   Temp 98.3 °F (36.8 °C)   Resp 17   Ht 5' 4.02\" (1.626 m) Comment: not updated this admit   SpO2 94%   Breastfeeding No   BMI 19.59 kg/m²     Temp (24hrs), Av.4 °F (36.9 °C), Min:98.2 °F (36.8 °C), Max:98.6 °F (37 °C)      PHYSICAL EXAM:   General: awake, no acute distress. AMS+  HEENT: EOMI, no Icterus, no Pallor, pupils reactive, mucosa dry, normal inspection of ears and nose, throat clear. Neck: Neck is supple, No JVD, no thyromegaly,   Lungs: breathsounds normal, no respiratory distress on inspection, no rhonchi, no rales,  CVS: heart sounds normal, regular rate and rhythm, no murmurs, no rubs.   GI: soft, nontender, normal BS, no palpable organomegaly,  Extremeties: no clubbing, no cyanosis, no edema,  Neuro: Alert, awake, altered mental status   Probably mental status is at baseline now   skin: normal skin turgor, no skin rashes   Musculoskeletal: no acute joint swellings    LAB DATA REVIEWED:    Recent Results (from the past 24 hour(s))   RENAL FUNCTION PANEL    Collection Time: 10/27/20 12:00 PM   Result Value Ref Range    Sodium 137 136 - 145 mmol/L    Potassium 4.4 3.5 - 5.1 mmol/L    Chloride 112 (H) 97 - 108 mmol/L    CO2 21 21 - 32 mmol/L    Anion gap 4 (L) 5 - 15 mmol/L    Glucose 86 65 - 100 mg/dL    BUN 10 6 - 20 mg/dL    Creatinine 0.91 0.55 - 1.02 mg/dL    BUN/Creatinine ratio 11 (L) 12 - 20      GFR est AA >60 >60 ml/min/1.73m2    GFR est non-AA >60 >60 ml/min/1.73m2    Calcium 8.6 8.5 - 10.1 mg/dL    Phosphorus 2.2 (L) 2.6 - 4.7 mg/dL    Albumin 2.6 (L) 3.5 - 5.0 g/dL        1. Hypernatremia:   Admitted with altered mental status. Initial sodium was high at 163   etiology was likely poor intake . Doubt central diabetes insipidus  Sodium had improved to 137 today  I will DC IVF   I will check  renal panel in am    2. ?? History of chronic kidney disease stage III:   patient had fluctuating Renal functions probably related to superimposed dehydration with diabetes insipidus   Creatinine is improved to 0.9  We will continue to monitor renal functions to assess the baseline     3. Altered mental status:   Patient has history of dementia, status post brain aneurysm resection with some residual neurological changes. Current Mental status changes are probably related to dehydration and hyponatremia ,   improving , mental status is probably around baseline now  Continue to monitor clinically    4. Severe hypokalemia  -K is only 3.4 today. S/p 3 doses of po KCl 40 mEq of oral KCl yest    5. Renal osteodystrophy: Calcium is stable. Phosphorus is 2.4. S/p replace with phosNaK  pending intact PTH, and vitamin D-25 hydroxyl level.        Signed: Rikki Khan MD

## 2020-10-27 NOTE — PROGRESS NOTES
NEURO PROGRESS NOTE        SUBJECTIVE:     Mental status changes  EXAM:  Awake, sitting up in bed, responds verbally more readily. Oriented to location, and month. Most commands more readily. Improved strength      ASSESSMENT/PLAN:  Mental status continues to improve. ALLERGIES:    No Known Allergies    MEDS:      Current Facility-Administered Medications:     levETIRAcetam (KEPPRA) tablet 750 mg, 750 mg, Oral, BID, Randall Hernandes MD, 750 mg at 10/27/20 0840    LORazepam (ATIVAN) injection 1 mg, 1 mg, IntraMUSCular, Q6H PRN, Randall Reynolds MD    cefUROXime (CEFTIN) tablet 250 mg, 250 mg, Oral, Q12H, Randall Hernandes MD, 250 mg at 10/27/20 0840    dextrose 5% infusion, 75 mL/hr, IntraVENous, CONTINUOUS, Bebeto Macdonald MD, Last Rate: 75 mL/hr at 10/26/20 0100, 75 mL/hr at 10/26/20 0100    amLODIPine (NORVASC) tablet 5 mg, 5 mg, Oral, DAILY, Yumiko Moe MD, 5 mg at 10/27/20 0840    mupirocin (BACTROBAN) 2 % ointment, , Topical, BID, Randall Hernandes MD, Stopped at 10/26/20 2100    acetaminophen (TYLENOL) tablet 650 mg, 650 mg, Oral, Q6H PRN, Cayetano Potter MD    sodium chloride (NS) flush 5-40 mL, 5-40 mL, IntraVENous, Q8H, Randall Hernandes MD, Stopped at 10/26/20 1400    sodium chloride (NS) flush 5-40 mL, 5-40 mL, IntraVENous, PRN, Randall Reynolds MD    docusate sodium (COLACE) capsule 100 mg, 100 mg, Oral, BID, Randall Hernandes MD, 100 mg at 10/26/20 2116    LABS:  No results found for this or any previous visit (from the past 24 hour(s)). Visit Vitals  BP (!) 141/78 (BP 1 Location: Right arm, BP Patient Position: At rest)   Pulse 82   Temp 98.6 °F (37 °C)   Resp 18   Ht 5' 4.02\" (1.626 m)   SpO2 95%   Breastfeeding No   BMI 19.59 kg/m²       Imaging:  CT HEAD WO CONT   Final Result   IMPRESSION: Postsurgical change similar compared to CT head April 24, 2020. No   intracranial hemorrhage.

## 2020-10-28 VITALS
BODY MASS INDEX: 19.59 KG/M2 | RESPIRATION RATE: 18 BRPM | DIASTOLIC BLOOD PRESSURE: 82 MMHG | OXYGEN SATURATION: 96 % | HEIGHT: 64 IN | SYSTOLIC BLOOD PRESSURE: 122 MMHG | TEMPERATURE: 98 F | HEART RATE: 87 BPM

## 2020-10-28 LAB
GLUCOSE BLD STRIP.AUTO-MCNC: 106 MG/DL (ref 65–100)
GLUCOSE BLD STRIP.AUTO-MCNC: 224 MG/DL (ref 65–100)
PERFORMED BY, TECHID: ABNORMAL
PERFORMED BY, TECHID: ABNORMAL

## 2020-10-28 PROCEDURE — 74011250637 HC RX REV CODE- 250/637: Performed by: INTERNAL MEDICINE

## 2020-10-28 PROCEDURE — 82962 GLUCOSE BLOOD TEST: CPT

## 2020-10-28 RX ADMIN — MUPIROCIN: 20 OINTMENT TOPICAL at 08:15

## 2020-10-28 RX ADMIN — CEFUROXIME AXETIL 250 MG: 250 TABLET ORAL at 08:15

## 2020-10-28 RX ADMIN — DOCUSATE SODIUM 100 MG: 100 CAPSULE, LIQUID FILLED ORAL at 08:15

## 2020-10-28 RX ADMIN — LEVETIRACETAM 750 MG: 250 TABLET, FILM COATED ORAL at 08:15

## 2020-10-28 RX ADMIN — AMLODIPINE BESYLATE 5 MG: 5 TABLET ORAL at 08:15

## 2020-10-28 NOTE — DISCHARGE INSTRUCTIONS
Patient Education        Urinary Tract Infection in Women: Care Instructions  Your Care Instructions     A urinary tract infection, or UTI, is a general term for an infection anywhere between the kidneys and the urethra (where urine comes out). Most UTIs are bladder infections. They often cause pain or burning when you urinate. UTIs are caused by bacteria and can be cured with antibiotics. Be sure to complete your treatment so that the infection goes away. Follow-up care is a key part of your treatment and safety. Be sure to make and go to all appointments, and call your doctor if you are having problems. It's also a good idea to know your test results and keep a list of the medicines you take. How can you care for yourself at home? · Take your antibiotics as directed. Do not stop taking them just because you feel better. You need to take the full course of antibiotics. · Drink extra water and other fluids for the next day or two. This may help wash out the bacteria that are causing the infection. (If you have kidney, heart, or liver disease and have to limit fluids, talk with your doctor before you increase your fluid intake.)  · Avoid drinks that are carbonated or have caffeine. They can irritate the bladder. · Urinate often. Try to empty your bladder each time. · To relieve pain, take a hot bath or lay a heating pad set on low over your lower belly or genital area. Never go to sleep with a heating pad in place. To prevent UTIs  · Drink plenty of water each day. This helps you urinate often, which clears bacteria from your system. (If you have kidney, heart, or liver disease and have to limit fluids, talk with your doctor before you increase your fluid intake.)  · Urinate when you need to. · Urinate right after you have sex. · Change sanitary pads often. · Avoid douches, bubble baths, feminine hygiene sprays, and other feminine hygiene products that have deodorants.   · After going to the bathroom, wipe from front to back. When should you call for help? Call your doctor now or seek immediate medical care if:    · Symptoms such as fever, chills, nausea, or vomiting get worse or appear for the first time.     · You have new pain in your back just below your rib cage. This is called flank pain.     · There is new blood or pus in your urine.     · You have any problems with your antibiotic medicine. Watch closely for changes in your health, and be sure to contact your doctor if:    · You are not getting better after taking an antibiotic for 2 days.     · Your symptoms go away but then come back. Where can you learn more? Go to http://www.gray.com/  Enter V510 in the search box to learn more about \"Urinary Tract Infection in Women: Care Instructions. \"  Current as of: June 29, 2020               Content Version: 12.6  © 7462-3873 [employer]. Care instructions adapted under license by [employer] (which disclaims liability or warranty for this information). If you have questions about a medical condition or this instruction, always ask your healthcare professional. [place] [address] any warranty or liability for your use of this information. Patient Education        Dehydration: Care Instructions  Your Care Instructions  Dehydration happens when your body loses too much fluid. This might happen when you do not drink enough water or you lose large amounts of fluids from your body because of diarrhea, vomiting, or sweating. Severe dehydration can be life-threatening. Water and minerals called electrolytes help put your body fluids back in balance. Learn the early signs of fluid loss, and drink more fluids to prevent dehydration. Follow-up care is a key part of your treatment and safety. Be sure to make and go to all appointments, and call your doctor if you are having problems.  It's also a good idea to know your test results and keep a list of the medicines you take. How can you care for yourself at home? · To prevent dehydration, drink plenty of fluids, enough so that your urine is light yellow or clear like water. Choose water and other caffeine-free clear liquids until you feel better. If you have kidney, heart, or liver disease and have to limit fluids, talk with your doctor before you increase the amount of fluids you drink. · If you do not feel like eating or drinking, try taking small sips of water, sports drinks, or other rehydration drinks. · Get plenty of rest.  To prevent dehydration  · Add more fluids to your diet and daily routine, unless your doctor has told you not to. · During hot weather, drink more fluids. Drink even more fluids if you exercise a lot. Stay away from drinks with alcohol or caffeine. · Watch for the symptoms of dehydration. These include:  ? A dry, sticky mouth. ? Dark yellow urine, and not much of it. ? Dry and sunken eyes. ? Feeling very tired. · Learn what problems can lead to dehydration. These include:  ? Diarrhea, fever, and vomiting. ? Any illness with a fever, such as pneumonia or the flu. ? Activities that cause heavy sweating, such as endurance races and heavy outdoor work in hot or humid weather. ? Alcohol or drug use or problems caused by quitting their use (withdrawal). ? Certain medicines, such as cold and allergy pills (antihistamines), diet pills (diuretics), and laxatives. ? Certain diseases, such as diabetes, cancer, and heart or kidney disease. When should you call for help? Call 911 anytime you think you may need emergency care. For example, call if:    · You passed out (lost consciousness). Call your doctor now or seek immediate medical care if:    · You are confused and cannot think clearly.     · You are dizzy or lightheaded, or you feel like you may faint.     · You have signs of needing more fluids. You have sunken eyes and a dry mouth, and you pass only a little dark urine.   · You cannot keep fluids down. Watch closely for changes in your health, and be sure to contact your doctor if:    · You are not making tears.     · Your skin is very dry and sags slowly back into place after you pinch it.     · Your mouth and eyes are very dry. Where can you learn more? Go to http://www.gray.com/  Enter Q814 in the search box to learn more about \"Dehydration: Care Instructions. \"  Current as of: June 26, 2019               Content Version: 12.6  © 8228-6308 [employer]. Care instructions adapted under license by [employer] (which disclaims liability or warranty for this information). If you have questions about a medical condition or this instruction, always ask your healthcare professional. [address] any warranty or liability for your use of this information.

## 2020-10-28 NOTE — PROGRESS NOTES
CM spoke with attending and received a verbal order to discharge if okay with nephrology. CM called Nephrologist and patient has been cleared for discharge from Nephro standpoint. Patient will be going into room 204B, nurse to call report to [phone]. CM called VIRGINIA Barry, at [phone]. He agrees with discharge today. CM and primary nurse have completed discharge checklist.  Discharge plan of care/case management plan validated with provider discharge order.

## 2020-10-28 NOTE — DISCHARGE SUMMARY
Discharge Summary     Patient: Kamala Rodriguez MRN: [id number]  SSN: [id number]    YOB: 1950  Age: 69 y.o. Sex: female       Admit Date: 10/17/2020    Discharge Date: 10/28/2020      Admission Diagnoses: Hypernatremia [E87.0]  UTI (urinary tract infection) [N39.0]  Hypernatremia [E87.0]    Discharge Diagnoses:   Problem List as of 10/28/2020 Never Reviewed          Codes Class Noted - Resolved    Hypernatremia ICD-10-CM: E87.0  ICD-9-CM: 276.0  10/17/2020 - Present        UTI (urinary tract infection) ICD-10-CM: N39.0  ICD-9-CM: 599.0  10/17/2020 - Present             COVID-19 negative   Possible seizure  Hypernatremia severe  Encephalopathy metabolic  discharge Condition: Good    Hospital Course: 69year-old patient was admitted for altered mental status patient was found to have elevated sodium level with UTI. Patient had COVID-19 test done which was negative. She was treated with IV hypotonic solutions and was seen by nephrologist.  Consults: Nephrology    Significant Diagnostic Studies: labs:   Recent Results (from the past 24 hour(s))   GLUCOSE, POC    Collection Time: 10/28/20  8:23 AM   Result Value Ref Range    Glucose (POC) 106 (H) 65 - 100 mg/dL    Performed by Guillermo Lamb, POC    Collection Time: 10/28/20 11:42 AM   Result Value Ref Range    Glucose (POC) 224 (H) 65 - 100 mg/dL    Performed by Catarina Escalante          CT HEAD WO CONT   Final Result   IMPRESSION: Postsurgical change similar compared to CT head April 24, 2020. No   intracranial hemorrhage. Disposition: SNF    Discharge Medications:   Current Discharge Medication List      START taking these medications    Details   cefUROXime (CEFTIN) 500 mg tablet Take 1 Tab by mouth two (2) times a day for 7 days. Qty: 14 Tab, Refills: 0      levETIRAcetam (Keppra) 500 mg tablet Take 1 Tab by mouth two (2) times a day.   Qty: 60 Tab, Refills: 0         CONTINUE these medications which have CHANGED Details   docusate sodium (COLACE) 100 mg capsule Take 1 Cap by mouth two (2) times a day for 90 days. Qty: 60 Cap, Refills: 2         CONTINUE these medications which have NOT CHANGED    Details   calcium carbonate (OS-GIN) 500 mg calcium (1,250 mg) tablet Take 2 Tabs by mouth every six (6) hours as needed for Indigestion. ferrous sulfate 300 mg (60 mg iron)/5 mL syrup Take  by mouth three (3) times daily. lamoTRIgine (LaMICtal) 100 mg tablet Take 100 mg by mouth two (2) times a day. Indications: convulsive seizures      Saccharomyces boulardii (Florastor) 250 mg capsule Take 250 mg by mouth two (2) times a day. rivaroxaban (Xarelto) 20 mg tab tablet Take 20 mg by mouth daily (with dinner).  Indications: treatment to prevent recurrence of a clot in a deep vein         STOP taking these medications       acetaminophen (TYLENOL) 650 mg TbER Comments:   Reason for Stopping:         desmopressin (DDAVP) 0.1 mg tablet Comments:   Reason for Stopping:         levETIRAcetam (KEPPRA) 100 mg/mL solution Comments:   Reason for Stopping:               Activity: Activity as tolerated  Diet: Cardiac Diet  Wound Care: As directed    Follow-up Appointments   Procedures    FOLLOW UP VISIT Appointment in: 3 - 5 Days     Standing Status:   Standing     Number of Occurrences:   1     Order Specific Question:   Appointment in     Answer:   3 - 5 Days   45 minutes discharge time    Signed By: Marlin Cárdenas MD     October 28, 2020

## 2020-10-28 NOTE — PROGRESS NOTES
NEURO PROGRESS NOTE        SUBJECTIVE:     Mental status changes, other medical problems    EXAM:  Awake, in pleasant mood, sitting up in a chair. Oriented and responds verbally.   Follows commands  No new focality    ASSESSMENT/PLAN:  Mental status significantly improved  ALLERGIES:    No Known Allergies    MEDS:      Current Facility-Administered Medications:     levETIRAcetam (KEPPRA) tablet 750 mg, 750 mg, Oral, BID, Randall Marie MD, 750 mg at 10/28/20 0815    LORazepam (ATIVAN) injection 1 mg, 1 mg, IntraMUSCular, Q6H PRN, Jolanta SMITH MD    cefUROXime (CEFTIN) tablet 250 mg, 250 mg, Oral, Q12H, Randall Marie MD, 250 mg at 10/28/20 0815    amLODIPine (NORVASC) tablet 5 mg, 5 mg, Oral, DAILY, Jorge Alberto Camacho MD, 5 mg at 10/28/20 0815    mupirocin (BACTROBAN) 2 % ointment, , Topical, BID, Randall Marie MD    acetaminophen (TYLENOL) tablet 650 mg, 650 mg, Oral, Q6H PRN, Yunior Jeffries MD    sodium chloride (NS) flush 5-40 mL, 5-40 mL, IntraVENous, PRN, Jolanta SMITH MD    docusate sodium (COLACE) capsule 100 mg, 100 mg, Oral, BID, Randall Marie MD, 100 mg at 10/28/20 0815    LABS:  Recent Results (from the past 24 hour(s))   RENAL FUNCTION PANEL    Collection Time: 10/27/20 12:00 PM   Result Value Ref Range    Sodium 137 136 - 145 mmol/L    Potassium 4.4 3.5 - 5.1 mmol/L    Chloride 112 (H) 97 - 108 mmol/L    CO2 21 21 - 32 mmol/L    Anion gap 4 (L) 5 - 15 mmol/L    Glucose 86 65 - 100 mg/dL    BUN 10 6 - 20 mg/dL    Creatinine 0.91 0.55 - 1.02 mg/dL    BUN/Creatinine ratio 11 (L) 12 - 20      GFR est AA >60 >60 ml/min/1.73m2    GFR est non-AA >60 >60 ml/min/1.73m2    Calcium 8.6 8.5 - 10.1 mg/dL    Phosphorus 2.2 (L) 2.6 - 4.7 mg/dL    Albumin 2.6 (L) 3.5 - 5.0 g/dL   GLUCOSE, POC    Collection Time: 10/28/20  8:23 AM   Result Value Ref Range    Glucose (POC) 106 (H) 65 - 100 mg/dL    Performed by Rupal Crowley        Visit Vitals  /82   Pulse 87   Temp 98 °F (36.7 °C)   Resp 18   Ht 5' 4.02\" (1.626 m)   SpO2 96%   Breastfeeding No   BMI 19.59 kg/m²       Imaging:  CT HEAD WO CONT   Final Result   IMPRESSION: Postsurgical change similar compared to CT head April 24, 2020. No   intracranial hemorrhage.

## 2020-10-28 NOTE — PROGRESS NOTES
Patient discharged to [address] [place] [address] in [place].  aware of discharge and agreeable as well as patient. Vital signs are stable at this time. No IV to remove for discharge. All instructions printed out and put into folder for [place]. Report given via telephone.

## 2021-01-13 ENCOUNTER — HOSPITAL ENCOUNTER (EMERGENCY)
Age: 71
Discharge: NURSING, REHAB OR LONG-TERM CARE | End: 2021-01-13
Attending: EMERGENCY MEDICINE
Payer: MEDICARE

## 2021-01-13 VITALS
BODY MASS INDEX: 22.2 KG/M2 | DIASTOLIC BLOOD PRESSURE: 88 MMHG | HEIGHT: 64 IN | WEIGHT: 130 LBS | HEART RATE: 70 BPM | OXYGEN SATURATION: 97 % | SYSTOLIC BLOOD PRESSURE: 175 MMHG | RESPIRATION RATE: 18 BRPM | TEMPERATURE: 98 F

## 2021-01-13 DIAGNOSIS — K64.4 EXTERNAL HEMORRHOID: ICD-10-CM

## 2021-01-13 DIAGNOSIS — K62.5 RECTAL BLEEDING: Primary | ICD-10-CM

## 2021-01-13 LAB
ALBUMIN SERPL-MCNC: 3.1 G/DL (ref 3.5–5)
ALBUMIN/GLOB SERPL: 0.8 {RATIO} (ref 1.1–2.2)
ALP SERPL-CCNC: 146 U/L (ref 45–117)
ALT SERPL-CCNC: 10 U/L (ref 12–78)
ANION GAP SERPL CALC-SCNC: 6 MMOL/L (ref 5–15)
APTT PPP: 33 SEC (ref 23–35.7)
AST SERPL W P-5'-P-CCNC: 9 U/L (ref 15–37)
BASOPHILS # BLD: 0 K/UL (ref 0–0.1)
BASOPHILS NFR BLD: 0 % (ref 0–1)
BILIRUB SERPL-MCNC: 0.4 MG/DL (ref 0.2–1)
BUN SERPL-MCNC: 13 MG/DL (ref 6–20)
BUN/CREAT SERPL: 11 (ref 12–20)
CA-I BLD-MCNC: 8.6 MG/DL (ref 8.5–10.1)
CHLORIDE SERPL-SCNC: 109 MMOL/L (ref 97–108)
CO2 SERPL-SCNC: 27 MMOL/L (ref 21–32)
CREAT SERPL-MCNC: 1.18 MG/DL (ref 0.55–1.02)
DIFFERENTIAL METHOD BLD: NORMAL
EOSINOPHIL # BLD: 0.2 K/UL (ref 0–0.4)
EOSINOPHIL NFR BLD: 3 % (ref 0–7)
ERYTHROCYTE [DISTWIDTH] IN BLOOD BY AUTOMATED COUNT: 14.4 % (ref 11.5–14.5)
GLOBULIN SER CALC-MCNC: 3.8 G/DL (ref 2–4)
GLUCOSE SERPL-MCNC: 77 MG/DL (ref 65–100)
HCT VFR BLD AUTO: 39.8 % (ref 35–47)
HGB BLD-MCNC: 12.7 G/DL (ref 11.5–16)
IMM GRANULOCYTES # BLD AUTO: 0 K/UL (ref 0–0.04)
IMM GRANULOCYTES NFR BLD AUTO: 0 % (ref 0–0.5)
INR PPP: 1.3 (ref 0.9–1.1)
LYMPHOCYTES # BLD: 2.1 K/UL (ref 0.8–3.5)
LYMPHOCYTES NFR BLD: 41 % (ref 12–49)
MCH RBC QN AUTO: 30.8 PG (ref 26–34)
MCHC RBC AUTO-ENTMCNC: 31.9 G/DL (ref 30–36.5)
MCV RBC AUTO: 96.6 FL (ref 80–99)
MONOCYTES # BLD: 0.3 K/UL (ref 0–1)
MONOCYTES NFR BLD: 6 % (ref 5–13)
NEUTS SEG # BLD: 2.6 K/UL (ref 1.8–8)
NEUTS SEG NFR BLD: 50 % (ref 32–75)
PLATELET # BLD AUTO: 255 K/UL (ref 150–400)
PMV BLD AUTO: 9.6 FL (ref 8.9–12.9)
POTASSIUM SERPL-SCNC: 3.5 MMOL/L (ref 3.5–5.1)
PROT SERPL-MCNC: 6.9 G/DL (ref 6.4–8.2)
PROTHROMBIN TIME: 16.1 SEC (ref 11.9–14.7)
RBC # BLD AUTO: 4.12 M/UL (ref 3.8–5.2)
SODIUM SERPL-SCNC: 142 MMOL/L (ref 136–145)
THERAPEUTIC RANGE,PTTT: NORMAL SEC (ref 68–109)
WBC # BLD AUTO: 5.1 K/UL (ref 3.6–11)

## 2021-01-13 PROCEDURE — 36415 COLL VENOUS BLD VENIPUNCTURE: CPT

## 2021-01-13 PROCEDURE — 85025 COMPLETE CBC W/AUTO DIFF WBC: CPT

## 2021-01-13 PROCEDURE — 99284 EMERGENCY DEPT VISIT MOD MDM: CPT

## 2021-01-13 PROCEDURE — 85730 THROMBOPLASTIN TIME PARTIAL: CPT

## 2021-01-13 PROCEDURE — 80053 COMPREHEN METABOLIC PANEL: CPT

## 2021-01-13 PROCEDURE — 85610 PROTHROMBIN TIME: CPT

## 2021-01-13 RX ORDER — HYDROCORTISONE 25 MG/G
CREAM TOPICAL 4 TIMES DAILY
Qty: 30 G | Refills: 0 | Status: SHIPPED | OUTPATIENT
Start: 2021-01-13 | End: 2021-04-23

## 2021-01-13 RX ORDER — SENNOSIDES 8.6 MG/1
1 TABLET ORAL DAILY
Qty: 30 TAB | Refills: 0 | Status: SHIPPED | OUTPATIENT
Start: 2021-01-13 | End: 2021-04-23

## 2021-01-13 NOTE — ED NOTES
TRANSFER - OUT REPORT:    Verbal report given to Agueda(name) on Bhavya E Young  being transferred to [facility](unit) for routine progression of care       Report consisted of patient’s Situation, Background, Assessment and   Recommendations(SBAR).     Information from the following report(s) SBAR, ED Summary, MAR and Recent Results was reviewed with the receiving nurse.    Lines:   Peripheral IV 01/13/21 Right Antecubital (Active)   Site Assessment Clean, dry, & intact 01/13/21 1644   Phlebitis Assessment 0 01/13/21 1644   Infiltration Assessment 0 01/13/21 1644   Dressing Status Clean, dry, & intact 01/13/21 1644   Dressing Type Tape 01/13/21 1644   Hub Color/Line Status Pink 01/13/21 1644        Opportunity for questions and clarification was provided.      Patient transported with:  EMS

## 2021-01-13 NOTE — ED PROVIDER NOTES
EMERGENCY DEPARTMENT HISTORY AND PHYSICAL EXAM      Date: 1/13/2021  Patient Name: June Geiger    History of Presenting Illness     Chief Complaint   Patient presents with    Rectal Bleeding       History Provided By: Patient    HPI: June Geiger, 69 y.o. female with PMHx as noted below presents the emergency department with chief complaint of rectal bleeding. Patient notes painful, bright red blood per rectum with stools over the last several days. Patient notes having history of issues with hemorrhoid. Denies any relieving or exacerbating factors. She otherwise having no pain. Denies any lightheadedness or syncope. PCP: Lavonne Frankel, MD    Current Outpatient Medications   Medication Sig Dispense Refill    hydrocortisone (Anusol-HC) 2.5 % rectal cream Insert  into rectum four (4) times daily. 30 g 0    senna (Senna) 8.6 mg tablet Take 1 Tab by mouth daily. 30 Tab 0    calcium carbonate (OS-GIN) 500 mg calcium (1,250 mg) tablet Take 2 Tabs by mouth every six (6) hours as needed for Indigestion.  ferrous sulfate 300 mg (60 mg iron)/5 mL syrup Take  by mouth three (3) times daily.  lamoTRIgine (LaMICtal) 100 mg tablet Take 100 mg by mouth two (2) times a day. Indications: convulsive seizures      Saccharomyces boulardii (Florastor) 250 mg capsule Take 250 mg by mouth two (2) times a day.  rivaroxaban (Xarelto) 20 mg tab tablet Take 20 mg by mouth daily (with dinner). Indications: treatment to prevent recurrence of a clot in a deep vein      docusate sodium (COLACE) 100 mg capsule Take 1 Cap by mouth two (2) times a day for 90 days. 60 Cap 2    levETIRAcetam (Keppra) 500 mg tablet Take 1 Tab by mouth two (2) times a day.  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 HENT: Negative for congestion, sore throat, rhinorrhea, sneezing and neck stiffness   Eyes: Negative for discharge and redness. Respiratory: Negative for  shortness of breath, wheezing   Cardiovascular: Negative for chest pain, palpitations   Gastrointestinal: Negative for nausea, vomiting, abdominal pain, constipation, diarrhea and blood in stool. Genitourinary: Negative for dysuria, hematuria, flank pain, decreased urine volume, discharge,   Musculoskeletal: Negative for myalgias or joint pain . Skin: Negative for rash or lesions . Neurological: Negative weakness, light-headedness, numbness and headaches. Physical Exam   Physical Exam    GENERAL: alert and oriented, no acute distress  EYES: PEERL, No injection, discharge or icterus. ENT: Mucous membranes pink and moist.  NECK: Supple  LUNGS: Airway patent. Non-labored respirations. Breath sounds clear with good air entry bilaterally. HEART: Regular rate and rhythm. No peripheral edema  ABDOMEN: Non-distended and non-tender, without guarding or rebound. RECTAL: Several large nonthrombosed external hemorrhoids, mild bleeding noted  SKIN:  warm, dry  MSK/EXTREMITIES: Without swelling, tenderness or deformity, symmetric with normal ROM  NEUROLOGICAL: Alert, oriented      Diagnostic Study Results     Labs -  Reviewed  Radiologic Studies -   No orders to display     CT Results  (Last 48 hours)    None        CXR Results  (Last 48 hours)    None            Medical Decision Making     Christina SMITH MD am the first provider for this patient and am the attending of record for this patient encounter. I reviewed the vital signs, available nursing notes, past medical history, past surgical history, family history and social history. Vital Signs-Reviewed the patient's vital signs.   No data found. Records Reviewed: Nursing Notes and Old Medical Records    Provider Notes (Medical Decision Making): On presentation, the patient is well appearing, in no acute distress with normal vital signs. Rectal bleeding, lower GI bleed, upper GI bleed, blood loss anemia. On exam, this does appear to be hemorrhoidal bleed. Hemoglobin is stable. I did place quick clot over the mild bleeding from the hemorrhoid. Patient refused to allow me to reassess the hemorrhoid however there were no signs of any life-threatening bleeding present here today. Will sign outpatient pending metabolic panel results and reassessment. ED Course:   Initial assessment performed. The patients presenting problems have been discussed, and they are in agreement with the care plan formulated and outlined with them. I have encouraged them to ask questions as they arise throughout their visit. SIGN OUT:  Patient's presentation, labs/imaging and plan of care was reviewed with Dr. Monica Blas as part of sign out. They will review lab results and reassess patient as part of the plan discussed with the patient. Dr. Tonny Rob assistance in completion of this plan is greatly appreciated but it should be noted that I will be the provider of record for this patient. Pavan Jones MD      ED Course as of Shai 15 2142   Wed Jan 13, 2021   1702 Patient received a signout from Dr. Marianna Veloz, to follow-up labs, reassess patient. I spoke with patient, states that she still has slight bleeding from rectum but proved from before, CBC resulted, hemoglobin 12.7, coags and CMP pending.    [PZ]   1738 Lab work reviewed, metabolic panel grossly normal coags unremarkable. Patient has no more episodes of significant rectal bleeding, will discharge patient. [PZ]      ED Course User Index  [PZ] Josemanuel Zhou MD       PROGRESS  Rober Rule  results have been reviewed with her. She has been counseled regarding her diagnosis.   She verbally conveys understanding and agreement of the signs, symptoms, diagnosis, treatment and prognosis and additionally agrees to follow up as recommended with Dr. Nathalie Menard MD in 24 - 48 hours. She also agrees with the care-plan and conveys that all of her questions have been answered. I have also put together some discharge instructions for her that include: 1) educational information regarding their diagnosis, 2) how to care for their diagnosis at home, as well a 3) list of reasons why they would want to return to the ED prior to their follow-up appointment, should their condition change. Disposition:  home    PLAN:  1. Discharge Medication List as of 1/13/2021  6:08 PM      START taking these medications    Details   hydrocortisone (Anusol-HC) 2.5 % rectal cream Insert  into rectum four (4) times daily. , Normal, Disp-30 g, R-0      senna (Senna) 8.6 mg tablet Take 1 Tab by mouth daily. , Normal, Disp-30 Tab, R-0         CONTINUE these medications which have NOT CHANGED    Details   calcium carbonate (OS-GIN) 500 mg calcium (1,250 mg) tablet Take 2 Tabs by mouth every six (6) hours as needed for Indigestion. , Historical Med      ferrous sulfate 300 mg (60 mg iron)/5 mL syrup Take  by mouth three (3) times daily. , Historical Med      lamoTRIgine (LaMICtal) 100 mg tablet Take 100 mg by mouth two (2) times a day. Indications: convulsive seizures, Historical Med      Saccharomyces boulardii (Florastor) 250 mg capsule Take 250 mg by mouth two (2) times a day., Historical Med      rivaroxaban (Xarelto) 20 mg tab tablet Take 20 mg by mouth daily (with dinner). Indications: treatment to prevent recurrence of a clot in a deep vein, Historical Med      docusate sodium (COLACE) 100 mg capsule Take 1 Cap by mouth two (2) times a day for 90 days. , Normal, Disp-60 Cap,R-2      levETIRAcetam (Keppra) 500 mg tablet Take 1 Tab by mouth two (2) times a day., Normal, Disp-60 Tab,R-0           2.    Follow-up Information     Follow up With Specialties Details Why Contact Info    Machelle Han MD [facility] Medicine Schedule an appointment as soon as possible for a visit in 2 days  [address] [facility] [address]  [phone]      [facility] EMERGENCY DEPT Emergency Medicine  If symptoms worsen [address]  Jamir Segura MD General Surgery Schedule an appointment as soon as possible for a visit in 1 day   [facility] [address]  [phone]          Return to ED if worse     Diagnosis     Clinical Impression:   1. Rectal bleeding    2. External hemorrhoid        Please note that this dictation was completed with Dragon, computer voice recognition software. Quite often unanticipated grammatical, syntax, homophones, and other interpretive errors are inadvertently transcribed by the computer software. Please disregard these errors. Additionally, please excuse any errors that have escaped final proofreading.

## 2021-01-13 NOTE — ED NOTES
Spoke with  regarding pt refusing all care, he advised that she had an aneurysm many years ago and has never been the same since. Dr assessed pt determined that she is alert and oriented to make her decisions. Pt refusing treatment from MD and multiple staff members. Pt requesting to be sent back with no further treatment. Spoke with nursing facility who advised that the are aware that we will be sending the patient back but that they may need to send the patient right back as she has not rcvd treatment. Advised them that we could only treat her by holding her down and that currently she is alert and oriented when speaking staff and that we cannot do that due to her refusing treatment. Advised nursing facility we would speak with the physician again regarding her treatment but currently she will be returning if there are no changes in her requests to not be treated.

## 2021-01-13 NOTE — ED TRIAGE NOTES
Pt arrives by EMS from [facility]. CC of rectal bleeding with complaints of \"bright red blood. \"

## 2021-01-13 NOTE — ED NOTES
Patient discharged following routine work found to to be within defined limits. Medications, fluids and treatments tolerated well by patient. Normal vital signs. Reviewed follow up instructions and medications with patient. Extensive conversation regarding return precautions discussed. Patient acknowledged understanding. All personal belongings taken by patient.

## 2021-01-13 NOTE — ED NOTES
Patient refuses all care. Patient will not allow any testing.  notified. Physician had conversation with patient and family.  Advised patient to return if symptoms worsen

## 2021-04-19 ENCOUNTER — APPOINTMENT (OUTPATIENT)
Dept: CT IMAGING | Age: 71
DRG: 101 | End: 2021-04-19
Attending: EMERGENCY MEDICINE
Payer: MEDICARE

## 2021-04-19 ENCOUNTER — HOSPITAL ENCOUNTER (INPATIENT)
Age: 71
LOS: 2 days | Discharge: NURSING, REHAB OR LONG-TERM CARE | DRG: 101 | End: 2021-04-23
Attending: EMERGENCY MEDICINE | Admitting: FAMILY MEDICINE
Payer: MEDICARE

## 2021-04-19 ENCOUNTER — APPOINTMENT (OUTPATIENT)
Dept: GENERAL RADIOLOGY | Age: 71
DRG: 101 | End: 2021-04-19
Attending: EMERGENCY MEDICINE
Payer: MEDICARE

## 2021-04-19 DIAGNOSIS — R56.9 SEIZURES (HCC): Primary | ICD-10-CM

## 2021-04-19 LAB
ALBUMIN SERPL-MCNC: 3 G/DL (ref 3.5–5)
ALBUMIN/GLOB SERPL: 0.8 {RATIO} (ref 1.1–2.2)
ALP SERPL-CCNC: 131 U/L (ref 45–117)
ALT SERPL-CCNC: 11 U/L (ref 12–78)
ANION GAP SERPL CALC-SCNC: 6 MMOL/L (ref 5–15)
APPEARANCE UR: ABNORMAL
AST SERPL W P-5'-P-CCNC: 15 U/L (ref 15–37)
BACTERIA URNS QL MICRO: ABNORMAL /HPF
BASOPHILS NFR BLD: 0 % (ref 0–1)
BILIRUB SERPL-MCNC: 0.5 MG/DL (ref 0.2–1)
BILIRUB UR QL: NEGATIVE
BUN SERPL-MCNC: 11 MG/DL (ref 6–20)
BUN/CREAT SERPL: 8 (ref 12–20)
CA-I BLD-MCNC: 8.1 MG/DL (ref 8.5–10.1)
CHLORIDE SERPL-SCNC: 105 MMOL/L (ref 97–108)
CO2 SERPL-SCNC: 27 MMOL/L (ref 21–32)
COLOR UR: ABNORMAL
COVID-19 RAPID TEST, COVR: NOT DETECTED
CREAT SERPL-MCNC: 1.34 MG/DL (ref 0.55–1.02)
DIFFERENTIAL METHOD BLD: ABNORMAL
EOSINOPHIL NFR BLD: 2 % (ref 0–7)
ERYTHROCYTE [DISTWIDTH] IN BLOOD BY AUTOMATED COUNT: 13.4 % (ref 11.5–14.5)
GLOBULIN SER CALC-MCNC: 3.9 G/DL (ref 2–4)
GLUCOSE SERPL-MCNC: 88 MG/DL (ref 65–100)
GLUCOSE UR STRIP.AUTO-MCNC: NEGATIVE MG/DL
HCT VFR BLD AUTO: 43.1 % (ref 35–47)
HGB BLD-MCNC: 13.6 G/DL (ref 11.5–16)
HGB UR QL STRIP: ABNORMAL
IMM GRANULOCYTES # BLD AUTO: 0 K/UL (ref 0–0.04)
IMM GRANULOCYTES NFR BLD AUTO: 0 % (ref 0–0.5)
KETONES UR QL STRIP.AUTO: NEGATIVE MG/DL
LEUKOCYTE ESTERASE UR QL STRIP.AUTO: NEGATIVE
LYMPHOCYTES NFR BLD: 40 % (ref 12–49)
MAGNESIUM SERPL-MCNC: 1.8 MG/DL (ref 1.6–2.4)
MCH RBC QN AUTO: 30.2 PG (ref 26–34)
MCHC RBC AUTO-ENTMCNC: 31.6 G/DL (ref 30–36.5)
MCV RBC AUTO: 95.8 FL (ref 80–99)
MONOCYTES NFR BLD: 8 % (ref 5–13)
MUCOUS THREADS URNS QL MICRO: ABNORMAL /LPF
NEUTS SEG NFR BLD: 49 % (ref 32–75)
NITRITE UR QL STRIP.AUTO: NEGATIVE
PH UR STRIP: 7 [PH] (ref 5–8)
PHOSPHATE SERPL-MCNC: 2.6 MG/DL (ref 2.6–4.7)
PLATELET # BLD AUTO: 140 K/UL (ref 150–400)
PMV BLD AUTO: 12.1 FL (ref 8.9–12.9)
POTASSIUM SERPL-SCNC: 3.1 MMOL/L (ref 3.5–5.1)
PROT SERPL-MCNC: 6.9 G/DL (ref 6.4–8.2)
PROT UR STRIP-MCNC: NEGATIVE MG/DL
RBC # BLD AUTO: 4.5 M/UL (ref 3.8–5.2)
RBC #/AREA URNS HPF: ABNORMAL /HPF (ref 0–5)
SARS-COV-2, COV2: NORMAL
SODIUM SERPL-SCNC: 138 MMOL/L (ref 136–145)
SP GR UR REFRACTOMETRY: 1.01 (ref 1–1.03)
SPECIMEN SOURCE: NORMAL
UA: UC IF INDICATED,UAUC: ABNORMAL
UROBILINOGEN UR QL STRIP.AUTO: 0.1 EU/DL (ref 0.1–1)
WBC # BLD AUTO: 4.7 K/UL (ref 3.6–11)
WBC URNS QL MICRO: ABNORMAL /HPF (ref 0–4)

## 2021-04-19 PROCEDURE — 80175 DRUG SCREEN QUAN LAMOTRIGINE: CPT

## 2021-04-19 PROCEDURE — 81001 URINALYSIS AUTO W/SCOPE: CPT

## 2021-04-19 PROCEDURE — 36415 COLL VENOUS BLD VENIPUNCTURE: CPT

## 2021-04-19 PROCEDURE — 74011250637 HC RX REV CODE- 250/637: Performed by: NURSE PRACTITIONER

## 2021-04-19 PROCEDURE — 96374 THER/PROPH/DIAG INJ IV PUSH: CPT

## 2021-04-19 PROCEDURE — 74011250636 HC RX REV CODE- 250/636: Performed by: EMERGENCY MEDICINE

## 2021-04-19 PROCEDURE — 84100 ASSAY OF PHOSPHORUS: CPT

## 2021-04-19 PROCEDURE — 83735 ASSAY OF MAGNESIUM: CPT

## 2021-04-19 PROCEDURE — 93005 ELECTROCARDIOGRAM TRACING: CPT

## 2021-04-19 PROCEDURE — 99285 EMERGENCY DEPT VISIT HI MDM: CPT

## 2021-04-19 PROCEDURE — 99218 HC RM OBSERVATION: CPT

## 2021-04-19 PROCEDURE — 71045 X-RAY EXAM CHEST 1 VIEW: CPT

## 2021-04-19 PROCEDURE — 80177 DRUG SCRN QUAN LEVETIRACETAM: CPT

## 2021-04-19 PROCEDURE — 87635 SARS-COV-2 COVID-19 AMP PRB: CPT

## 2021-04-19 PROCEDURE — 70450 CT HEAD/BRAIN W/O DYE: CPT

## 2021-04-19 PROCEDURE — 80053 COMPREHEN METABOLIC PANEL: CPT

## 2021-04-19 PROCEDURE — 85025 COMPLETE CBC W/AUTO DIFF WBC: CPT

## 2021-04-19 RX ORDER — LEVETIRACETAM 10 MG/ML
1000 INJECTION INTRAVASCULAR
Status: COMPLETED | OUTPATIENT
Start: 2021-04-19 | End: 2021-04-19

## 2021-04-19 RX ORDER — ACETAMINOPHEN 650 MG/1
650 SUPPOSITORY RECTAL
Status: DISCONTINUED | OUTPATIENT
Start: 2021-04-19 | End: 2021-04-24 | Stop reason: HOSPADM

## 2021-04-19 RX ORDER — POLYETHYLENE GLYCOL 3350 17 G/17G
17 POWDER, FOR SOLUTION ORAL DAILY PRN
Status: DISCONTINUED | OUTPATIENT
Start: 2021-04-19 | End: 2021-04-24 | Stop reason: HOSPADM

## 2021-04-19 RX ORDER — SODIUM CHLORIDE 0.9 % (FLUSH) 0.9 %
5-40 SYRINGE (ML) INJECTION EVERY 8 HOURS
Status: DISCONTINUED | OUTPATIENT
Start: 2021-04-19 | End: 2021-04-22

## 2021-04-19 RX ORDER — SODIUM CHLORIDE 9 MG/ML
75 INJECTION, SOLUTION INTRAVENOUS CONTINUOUS
Status: DISCONTINUED | OUTPATIENT
Start: 2021-04-19 | End: 2021-04-22

## 2021-04-19 RX ORDER — LORAZEPAM 2 MG/ML
2 INJECTION INTRAMUSCULAR
Status: DISCONTINUED | OUTPATIENT
Start: 2021-04-19 | End: 2021-04-24 | Stop reason: HOSPADM

## 2021-04-19 RX ORDER — PROMETHAZINE HYDROCHLORIDE 25 MG/1
12.5 TABLET ORAL
Status: DISCONTINUED | OUTPATIENT
Start: 2021-04-19 | End: 2021-04-24 | Stop reason: HOSPADM

## 2021-04-19 RX ORDER — POTASSIUM CHLORIDE 1.5 G/1.77G
40 POWDER, FOR SOLUTION ORAL
Status: COMPLETED | OUTPATIENT
Start: 2021-04-19 | End: 2021-04-19

## 2021-04-19 RX ORDER — ONDANSETRON 2 MG/ML
4 INJECTION INTRAMUSCULAR; INTRAVENOUS
Status: DISCONTINUED | OUTPATIENT
Start: 2021-04-19 | End: 2021-04-24 | Stop reason: HOSPADM

## 2021-04-19 RX ORDER — SODIUM CHLORIDE 0.9 % (FLUSH) 0.9 %
5-40 SYRINGE (ML) INJECTION AS NEEDED
Status: DISCONTINUED | OUTPATIENT
Start: 2021-04-19 | End: 2021-04-24 | Stop reason: HOSPADM

## 2021-04-19 RX ORDER — ACETAMINOPHEN 325 MG/1
650 TABLET ORAL
Status: DISCONTINUED | OUTPATIENT
Start: 2021-04-19 | End: 2021-04-24 | Stop reason: HOSPADM

## 2021-04-19 RX ADMIN — LEVETIRACETAM 1000 MG: 10 INJECTION INTRAVENOUS at 16:00

## 2021-04-19 RX ADMIN — Medication 10 ML: at 22:00

## 2021-04-19 RX ADMIN — POTASSIUM CHLORIDE 40 MEQ: 1.5 FOR SOLUTION ORAL at 21:30

## 2021-04-19 RX ADMIN — SODIUM CHLORIDE 75 ML/HR: 9 INJECTION, SOLUTION INTRAVENOUS at 21:31

## 2021-04-19 NOTE — ED TRIAGE NOTES
[facility] pt is a resident at [address] [place] [address] where she has been having seizure like activity for the past few days; last seizure was around 1045 this am

## 2021-04-19 NOTE — ED NOTES
Bedside and Verbal shift change report given to [facility] (oncoming nurse) by Giles Griffith (offgoing nurse). Report included the following information SBAR, Kardex, ED Summary, [facility] and Recent Results. Patient to be admitted. Rapid COVID swab done and results pending. No seizure activity during my care. Patient assisted bedpan for toileting multiple times this shift. Resting with eyes closed at this time.

## 2021-04-19 NOTE — ED PROVIDER NOTES
EMERGENCY DEPARTMENT HISTORY AND PHYSICAL EXAM        Date: 4/19/2021  Patient Name: Marco Doran    History of Presenting Illness     Chief Complaint   Patient presents with    Seizure     History Provided By: Patient    HPI: Marco Doran, 69 y.o. female with past medical history of seizures, CVA, dementia, chronic kidney disease, hypertension, hyperlipidemia who presents with seizures. Patient is a nursing home. Noted to have seizures over last 24 hours. She has had multiple seizures today. Patient is a poor historian. Unable to obtain further history due to patient dementia. On review of her documentation patient takes lamotrigine 100 mg twice daily and Keppra as a milligram twice daily for seizures. PCP: Rafael Gonzalez MD    Current Facility-Administered Medications   Medication Dose Route Frequency Provider Last Rate Last Admin    0.9% sodium chloride infusion  75 mL/hr IntraVENous CONTINUOUS Charlynn Cutting,          Current Outpatient Medications   Medication Sig Dispense Refill    hydrocortisone (Anusol-HC) 2.5 % rectal cream Insert  into rectum four (4) times daily. 30 g 0    senna (Senna) 8.6 mg tablet Take 1 Tab by mouth daily. 30 Tab 0    calcium carbonate (OS-GIN) 500 mg calcium (1,250 mg) tablet Take 2 Tabs by mouth every six (6) hours as needed for Indigestion.  ferrous sulfate 300 mg (60 mg iron)/5 mL syrup Take  by mouth three (3) times daily.  lamoTRIgine (LaMICtal) 100 mg tablet Take 100 mg by mouth two (2) times a day. Indications: convulsive seizures      Saccharomyces boulardii (Florastor) 250 mg capsule Take 250 mg by mouth two (2) times a day.  rivaroxaban (Xarelto) 20 mg tab tablet Take 20 mg by mouth daily (with dinner). Indications: treatment to prevent recurrence of a clot in a deep vein      levETIRAcetam (Keppra) 500 mg tablet Take 1 Tab by mouth two (2) times a day.  61 Tab 0       Past History     Past Medical History:  Seizures, CVA, dementia, chronic kidney disease, hypertension, hyperlipidemia    Past Surgical History:  Reviewed and noncontributory    Family History:  Reviewed and noncontributory    Social History:  Social History     Tobacco Use    Smoking status: Not on file   Substance Use Topics    Alcohol use: Not on file    Drug use: Not on file       Allergies:  No Known Allergies      Review of Systems   Review of Systems   Constitutional: Negative for fever. HENT: Negative for congestion. Eyes: Negative for visual disturbance. Respiratory: Negative for shortness of breath. Cardiovascular: Negative for chest pain. Gastrointestinal: Negative for abdominal pain. Genitourinary: Negative for dysuria. Musculoskeletal: Negative for arthralgias. Skin: Negative for rash. Neurological: Positive for seizures. Negative for headaches. Physical Exam   Constitutional: No acute distress. Well-nourished. Skin: No rash. ENT: No rhinorrhea. No cough. Head is normocephalic and atraumatic. Eye: No proptosis or conjunctival injections. Respiratory: No apparent respiratory distress. Gastrointestinal: Nondistended. Musculoskeletal: No obvious bony deformities. Neuro: Patient does have twitching of her right eye. Otherwise normal neurological exam without any obvious deficits. Cranial nerves appear to be intact. Moves all 4 extremities normally.     Diagnostic Study Results     Labs -     Recent Results (from the past 24 hour(s))   CBC WITH AUTOMATED DIFF    Collection Time: 04/19/21  1:50 PM   Result Value Ref Range    WBC 4.7 3.6 - 11.0 K/uL    RBC 4.50 3.80 - 5.20 M/uL    HGB 13.6 11.5 - 16.0 g/dL    HCT 43.1 35.0 - 47.0 %    MCV 95.8 80.0 - 99.0 FL    MCH 30.2 26.0 - 34.0 PG    MCHC 31.6 30.0 - 36.5 g/dL    RDW 13.4 11.5 - 14.5 %    PLATELET 767 (L) 976 - 400 K/uL    MPV 12.1 8.9 - 12.9 FL    NEUTROPHILS 49 32 - 75 %    LYMPHOCYTES 40 12 - 49 %    MONOCYTES 8 5 - 13 %    EOSINOPHILS 2 0 - 7 %    BASOPHILS 0 0 - 1 %    IMMATURE GRANULOCYTES 0 0.0 - 0.5 %    ABS. IMM. GRANS. 0 0.00 - 0.04 K/UL    DF AUTOMATED     METABOLIC PANEL, COMPREHENSIVE    Collection Time: 04/19/21  1:50 PM   Result Value Ref Range    Sodium 138 136 - 145 mmol/L    Potassium 3.1 (L) 3.5 - 5.1 mmol/L    Chloride 105 97 - 108 mmol/L    CO2 27 21 - 32 mmol/L    Anion gap 6 5 - 15 mmol/L    Glucose 88 65 - 100 mg/dL    BUN 11 6 - 20 mg/dL    Creatinine 1.34 (H) 0.55 - 1.02 mg/dL    BUN/Creatinine ratio 8 (L) 12 - 20      GFR est AA 47 (L) >60 ml/min/1.73m2    GFR est non-AA 39 (L) >60 ml/min/1.73m2    Calcium 8.1 (L) 8.5 - 10.1 mg/dL    Bilirubin, total 0.5 0.2 - 1.0 mg/dL    AST (SGOT) 15 15 - 37 U/L    ALT (SGPT) 11 (L) 12 - 78 U/L    Alk. phosphatase 131 (H) 45 - 117 U/L    Protein, total 6.9 6.4 - 8.2 g/dL    Albumin 3.0 (L) 3.5 - 5.0 g/dL    Globulin 3.9 2.0 - 4.0 g/dL    A-G Ratio 0.8 (L) 1.1 - 2.2     URINALYSIS W/ REFLEX CULTURE    Collection Time: 04/19/21  2:53 PM    Specimen: Urine   Result Value Ref Range    Color Yellow/Straw      Appearance Turbid (A) Clear      Specific gravity 1.009 1.003 - 1.030      pH (UA) 7.0 5.0 - 8.0      Protein Negative Negative mg/dL    Glucose Negative Negative mg/dL    Ketone Negative Negative mg/dL    Bilirubin Negative Negative      Blood Small (A) Negative      Urobilinogen 0.1 0.1 - 1.0 EU/dL    Nitrites Negative Negative      Leukocyte Esterase Negative Negative      UA:UC IF INDICATED Culture not indicated by UA result Culture not indicated by UA result      WBC 5-10 0 - 4 /hpf    RBC 5-10 0 - 5 /hpf    Bacteria 2+ (A) Negative /hpf    Mucus Trace /lpf       Radiologic Studies -   CT HEAD WO CONT   Final Result   Similar findings compared to CT head October 21, 2020. XR CHEST SNGL V   Final Result   No acute findings. CT Results  (Last 48 hours)               04/19/21 1522  CT HEAD WO CONT Final result    Impression:  Similar findings compared to CT head October 21, 2020.        Narrative:  CT head Comparison CT head October 21, 2020. Axial images are reviewed along with reformatted sagittal/coronal images. Dose reduction: All CT scans at this facility are performed using dose reduction   optimization techniques as appropriate to a performed exam including the   following-   automated exposure control, adjustments of mA and/or Kv according to patient   size, or use of iterative reconstructive technique. Prior right craniotomy. Imaged sinuses and mastoid air cells are normally   aerated. Review of intracranial content again demonstrates prior placed hardware. Beam   hardening artifact noted. Left frontal temporal encephalomalacia with   compensatory enlargement of the left frontal horn lateral ventricle unchanged. Similar periventricular white matter gliosis. No intracranial hemorrhage. CXR Results  (Last 48 hours)               04/19/21 1501  XR CHEST SNGL V Final result    Impression:  No acute findings. Narrative:  Multiple seizures. Comparison chest x-ray 4/24/2020. FINDINGS: Frontal single view of the chest. Normal heart size. Calcified aorta. No vascular congestion or pulmonary edema. The lungs are similarly inflated. Left lower lung scarring. No infiltrate, effusion, pneumothorax. No free air   under diaphragm. Bony structures intact. Medical Decision Making and ED Course     I reviewed the available vital signs, nursing notes, past medical history, past surgical history, family history, and social history. Vital Signs - Reviewed the patient's vital signs. Patient Vitals for the past 12 hrs:   Temp Pulse Resp BP SpO2   04/19/21 1415  89 14 (!) 152/60 95 %   04/19/21 1345 97.6 °F (36.4 °C) 86 14 134/67 96 %   04/19/21 1147 98 °F (36.7 °C) 73 16 (!) 155/88 96 %       EKG interpretation: Obtained on 4/19/2021 at 1528.   Normal sinus rhythm at rate of 70 bpm.  Normal VA interval, QRS duration, QTc interval.  No ST segment abnormalities. Normal axis. Medical Decision Making:   Presented with seizures. The differential diagnosis is seizures, syncope, noncompliance, subtherapeutic therapy. Patient had multiple seizures in the last 24 hours. She is having twitching of her right eye concerning for seizure activity. Work-up is negative. Did give her a gram of Keppra. Will admit to Dr. Olman Ramirez. Disposition     Admitted to Dr. Olman Ramirez        Diagnosis     Clinical impression:   1. Seizures ([address]           Attestation:  Please note that this dictation was completed with [employer], the computer voice recognition software. Quite often unanticipated grammatical, syntax, homophones, and other interpretive errors are inadvertently transcribed by the computer software. Please disregard these errors. Please excuse any errors that have escaped final proofreading. Thank you.   Baryr Spencer, DO

## 2021-04-20 LAB
ALBUMIN SERPL-MCNC: 3.2 G/DL (ref 3.5–5)
ALBUMIN/GLOB SERPL: 0.7 {RATIO} (ref 1.1–2.2)
ALP SERPL-CCNC: 148 U/L (ref 45–117)
ALT SERPL-CCNC: 14 U/L (ref 12–78)
ANION GAP SERPL CALC-SCNC: 10 MMOL/L (ref 5–15)
AST SERPL W P-5'-P-CCNC: 21 U/L (ref 15–37)
ATRIAL RATE: 76 BPM
BASOPHILS # BLD: 0 K/UL (ref 0–0.1)
BASOPHILS NFR BLD: 0 % (ref 0–1)
BILIRUB SERPL-MCNC: 0.7 MG/DL (ref 0.2–1)
BUN SERPL-MCNC: 11 MG/DL (ref 6–20)
BUN/CREAT SERPL: 10 (ref 12–20)
CA-I BLD-MCNC: 8.3 MG/DL (ref 8.5–10.1)
CALCULATED P AXIS, ECG09: 1 DEGREES
CALCULATED R AXIS, ECG10: -9 DEGREES
CALCULATED T AXIS, ECG11: 7 DEGREES
CHLORIDE SERPL-SCNC: 102 MMOL/L (ref 97–108)
CO2 SERPL-SCNC: 22 MMOL/L (ref 21–32)
CREAT SERPL-MCNC: 1.06 MG/DL (ref 0.55–1.02)
DIAGNOSIS, 93000: NORMAL
DIFFERENTIAL METHOD BLD: NORMAL
EOSINOPHIL # BLD: 0 K/UL (ref 0–0.4)
EOSINOPHIL NFR BLD: 0 % (ref 0–7)
ERYTHROCYTE [DISTWIDTH] IN BLOOD BY AUTOMATED COUNT: 13 % (ref 11.5–14.5)
GLOBULIN SER CALC-MCNC: 4.4 G/DL (ref 2–4)
GLUCOSE BLD STRIP.AUTO-MCNC: 102 MG/DL (ref 65–100)
GLUCOSE BLD STRIP.AUTO-MCNC: 147 MG/DL (ref 65–100)
GLUCOSE SERPL-MCNC: 87 MG/DL (ref 65–100)
HCT VFR BLD AUTO: 47.3 % (ref 35–47)
HGB BLD-MCNC: 15.8 G/DL (ref 11.5–16)
IMM GRANULOCYTES # BLD AUTO: 0 K/UL
IMM GRANULOCYTES NFR BLD AUTO: 0 %
INR PPP: 1.1 (ref 0.9–1.1)
LAMOTRIGINE SERPL-MCNC: 14.5 UG/ML (ref 2–20)
LYMPHOCYTES # BLD: 1.9 K/UL (ref 0.8–3.5)
LYMPHOCYTES NFR BLD: 36 % (ref 12–49)
MCH RBC QN AUTO: 30.3 PG (ref 26–34)
MCHC RBC AUTO-ENTMCNC: 33.4 G/DL (ref 30–36.5)
MCV RBC AUTO: 90.6 FL (ref 80–99)
MONOCYTES # BLD: 0.5 K/UL (ref 0–1)
MONOCYTES NFR BLD: 10 % (ref 5–13)
MRSA DNA SPEC QL NAA+PROBE: NOT DETECTED
NEUTS SEG # BLD: 2.9 K/UL (ref 1.8–8)
NEUTS SEG NFR BLD: 54 % (ref 32–75)
NRBC # BLD: 0.06 K/UL (ref 0–0.01)
NRBC BLD-RTO: 1.1 PER 100 WBC
P-R INTERVAL, ECG05: 170 MS
PERFORMED BY, TECHID: ABNORMAL
PERFORMED BY, TECHID: ABNORMAL
PLATELET # BLD AUTO: 129 K/UL (ref 150–400)
PLATELET COMMENTS,PCOM: NORMAL
PMV BLD AUTO: 11.9 FL (ref 8.9–12.9)
POTASSIUM SERPL-SCNC: 3 MMOL/L (ref 3.5–5.1)
PROT SERPL-MCNC: 7.6 G/DL (ref 6.4–8.2)
PROTHROMBIN TIME: 14.2 SEC (ref 11.9–14.7)
Q-T INTERVAL, ECG07: 430 MS
QRS DURATION, ECG06: 86 MS
QTC CALCULATION (BEZET), ECG08: 483 MS
RBC # BLD AUTO: 5.22 M/UL (ref 3.8–5.2)
RBC MORPH BLD: NORMAL
SODIUM SERPL-SCNC: 134 MMOL/L (ref 136–145)
VENTRICULAR RATE, ECG03: 76 BPM
WBC # BLD AUTO: 5.3 K/UL (ref 3.6–11)

## 2021-04-20 PROCEDURE — 85027 COMPLETE CBC AUTOMATED: CPT

## 2021-04-20 PROCEDURE — 82962 GLUCOSE BLOOD TEST: CPT

## 2021-04-20 PROCEDURE — 74011250637 HC RX REV CODE- 250/637: Performed by: FAMILY MEDICINE

## 2021-04-20 PROCEDURE — 80053 COMPREHEN METABOLIC PANEL: CPT

## 2021-04-20 PROCEDURE — 95816 EEG AWAKE AND DROWSY: CPT | Performed by: PSYCHIATRY & NEUROLOGY

## 2021-04-20 PROCEDURE — 36415 COLL VENOUS BLD VENIPUNCTURE: CPT

## 2021-04-20 PROCEDURE — 85610 PROTHROMBIN TIME: CPT

## 2021-04-20 PROCEDURE — 84146 ASSAY OF PROLACTIN: CPT

## 2021-04-20 PROCEDURE — 87641 MR-STAPH DNA AMP PROBE: CPT

## 2021-04-20 PROCEDURE — 99218 HC RM OBSERVATION: CPT

## 2021-04-20 PROCEDURE — 74011250637 HC RX REV CODE- 250/637: Performed by: NURSE PRACTITIONER

## 2021-04-20 PROCEDURE — 80175 DRUG SCREEN QUAN LAMOTRIGINE: CPT

## 2021-04-20 RX ORDER — SAME BUTANEDISULFONATE/BETAINE 400-600 MG
250 POWDER IN PACKET (EA) ORAL 2 TIMES DAILY
Status: DISCONTINUED | OUTPATIENT
Start: 2021-04-20 | End: 2021-04-20

## 2021-04-20 RX ORDER — CALCIUM CARBONATE 200(500)MG
400 TABLET,CHEWABLE ORAL
Status: DISCONTINUED | OUTPATIENT
Start: 2021-04-20 | End: 2021-04-24 | Stop reason: HOSPADM

## 2021-04-20 RX ORDER — LEVETIRACETAM 500 MG/1
1000 TABLET ORAL 2 TIMES DAILY
Status: DISCONTINUED | OUTPATIENT
Start: 2021-04-20 | End: 2021-04-24 | Stop reason: HOSPADM

## 2021-04-20 RX ORDER — AMLODIPINE BESYLATE 5 MG/1
10 TABLET ORAL DAILY
Status: DISCONTINUED | OUTPATIENT
Start: 2021-04-20 | End: 2021-04-24 | Stop reason: HOSPADM

## 2021-04-20 RX ORDER — LAMOTRIGINE 100 MG/1
100 TABLET ORAL 2 TIMES DAILY
Status: DISCONTINUED | OUTPATIENT
Start: 2021-04-20 | End: 2021-04-20 | Stop reason: SDUPTHER

## 2021-04-20 RX ORDER — LAMOTRIGINE 100 MG/1
100 TABLET ORAL 2 TIMES DAILY
Status: DISCONTINUED | OUTPATIENT
Start: 2021-04-20 | End: 2021-04-24 | Stop reason: HOSPADM

## 2021-04-20 RX ORDER — LEVETIRACETAM 500 MG/1
500 TABLET ORAL 2 TIMES DAILY
Status: DISCONTINUED | OUTPATIENT
Start: 2021-04-20 | End: 2021-04-20 | Stop reason: SDUPTHER

## 2021-04-20 RX ORDER — DOCUSATE SODIUM 100 MG/1
100 CAPSULE, LIQUID FILLED ORAL 2 TIMES DAILY
COMMUNITY
End: 2021-08-09

## 2021-04-20 RX ORDER — DESMOPRESSIN ACETATE 0.2 MG/1
0.2 TABLET ORAL 3 TIMES DAILY
COMMUNITY

## 2021-04-20 RX ORDER — SAME BUTANEDISULFONATE/BETAINE 400-600 MG
250 POWDER IN PACKET (EA) ORAL 2 TIMES DAILY
Status: DISCONTINUED | OUTPATIENT
Start: 2021-04-20 | End: 2021-04-24 | Stop reason: HOSPADM

## 2021-04-20 RX ORDER — FERROUS SULFATE 300 MG/5ML
300 LIQUID (ML) ORAL 2 TIMES DAILY WITH MEALS
Status: DISCONTINUED | OUTPATIENT
Start: 2021-04-20 | End: 2021-04-24 | Stop reason: HOSPADM

## 2021-04-20 RX ADMIN — Medication 250 MG: at 17:05

## 2021-04-20 RX ADMIN — MINERAL SUPPLEMENT IRON 300 MG / 5 ML STRENGTH LIQUID 100 PER BOX UNFLAVORED 300 MG: at 17:05

## 2021-04-20 RX ADMIN — Medication 250 MG: at 20:49

## 2021-04-20 RX ADMIN — Medication 10 ML: at 05:28

## 2021-04-20 RX ADMIN — AMLODIPINE BESYLATE 10 MG: 5 TABLET ORAL at 01:17

## 2021-04-20 RX ADMIN — Medication 10 ML: at 14:00

## 2021-04-20 RX ADMIN — LAMOTRIGINE 100 MG: 100 TABLET ORAL at 20:48

## 2021-04-20 RX ADMIN — LAMOTRIGINE 100 MG: 100 TABLET ORAL at 10:25

## 2021-04-20 RX ADMIN — RIVAROXABAN 20 MG: 20 TABLET, FILM COATED ORAL at 17:05

## 2021-04-20 RX ADMIN — LEVETIRACETAM 1000 MG: 500 TABLET ORAL at 20:49

## 2021-04-20 RX ADMIN — LEVETIRACETAM 1000 MG: 500 TABLET ORAL at 10:25

## 2021-04-20 NOTE — PROGRESS NOTES
PT consult received and eval attempted and initiated. Upon initiation of eval, transport came to take pt down for test.  Will attempt PT eval again at a later time.

## 2021-04-20 NOTE — PROGRESS NOTES
General Daily Progress Note          Patient Name:   Bailee King       YOB: 1950       Age:  69 y.o. Admit Date: 4/19/2021      Subjective:       Patient sitting up in bed eating breakfast without evidence of dysphagia or aspiration  No seizure activity noted  Continues with altered mental status at her baseline from previous assessments    This morning's labs are still pending      Objective:     Visit Vitals  /87   Pulse 78   Temp 97.2 °F (36.2 °C)   Resp 18   Ht 5' 7\" (1.702 m)   Wt 59 kg (130 lb)   SpO2 98%   BMI 20.36 kg/m²        Recent Results (from the past 24 hour(s))   CBC WITH AUTOMATED DIFF    Collection Time: 04/19/21  1:50 PM   Result Value Ref Range    WBC 4.7 3.6 - 11.0 K/uL    RBC 4.50 3.80 - 5.20 M/uL    HGB 13.6 11.5 - 16.0 g/dL    HCT 43.1 35.0 - 47.0 %    MCV 95.8 80.0 - 99.0 FL    MCH 30.2 26.0 - 34.0 PG    MCHC 31.6 30.0 - 36.5 g/dL    RDW 13.4 11.5 - 14.5 %    PLATELET 738 (L) 831 - 400 K/uL    MPV 12.1 8.9 - 12.9 FL    NEUTROPHILS 49 32 - 75 %    LYMPHOCYTES 40 12 - 49 %    MONOCYTES 8 5 - 13 %    EOSINOPHILS 2 0 - 7 %    BASOPHILS 0 0 - 1 %    IMMATURE GRANULOCYTES 0 0.0 - 0.5 %    ABS. IMM. GRANS. 0 0.00 - 0.04 K/UL    DF AUTOMATED     METABOLIC PANEL, COMPREHENSIVE    Collection Time: 04/19/21  1:50 PM   Result Value Ref Range    Sodium 138 136 - 145 mmol/L    Potassium 3.1 (L) 3.5 - 5.1 mmol/L    Chloride 105 97 - 108 mmol/L    CO2 27 21 - 32 mmol/L    Anion gap 6 5 - 15 mmol/L    Glucose 88 65 - 100 mg/dL    BUN 11 6 - 20 mg/dL    Creatinine 1.34 (H) 0.55 - 1.02 mg/dL    BUN/Creatinine ratio 8 (L) 12 - 20      GFR est AA 47 (L) >60 ml/min/1.73m2    GFR est non-AA 39 (L) >60 ml/min/1.73m2    Calcium 8.1 (L) 8.5 - 10.1 mg/dL    Bilirubin, total 0.5 0.2 - 1.0 mg/dL    AST (SGOT) 15 15 - 37 U/L    ALT (SGPT) 11 (L) 12 - 78 U/L    Alk.  phosphatase 131 (H) 45 - 117 U/L    Protein, total 6.9 6.4 - 8.2 g/dL    Albumin 3.0 (L) 3.5 - 5.0 g/dL    Globulin 3.9 2.0 - 4.0 g/dL    A-G Ratio 0.8 (L) 1.1 - 2.2     URINALYSIS W/ REFLEX CULTURE    Collection Time: 04/19/21  2:53 PM    Specimen: Urine   Result Value Ref Range    Color Yellow/Straw      Appearance Turbid (A) Clear      Specific gravity 1.009 1.003 - 1.030      pH (UA) 7.0 5.0 - 8.0      Protein Negative Negative mg/dL    Glucose Negative Negative mg/dL    Ketone Negative Negative mg/dL    Bilirubin Negative Negative      Blood Small (A) Negative      Urobilinogen 0.1 0.1 - 1.0 EU/dL    Nitrites Negative Negative      Leukocyte Esterase Negative Negative      UA:UC IF INDICATED Culture not indicated by UA result Culture not indicated by UA result      WBC 5-10 0 - 4 /hpf    RBC 5-10 0 - 5 /hpf    Bacteria 2+ (A) Negative /hpf    Mucus Trace /lpf   COVID-19 RAPID TEST    Collection Time: 04/19/21  7:10 PM   Result Value Ref Range    Specimen source Nasopharyngeal      COVID-19 rapid test Not Detected Not Detected     SARS-COV-2    Collection Time: 04/19/21  7:10 PM   Result Value Ref Range    SARS-CoV-2 Please find results under separate order     MAGNESIUM    Collection Time: 04/19/21  8:45 PM   Result Value Ref Range    Magnesium 1.8 1.6 - 2.4 mg/dL   PHOSPHORUS    Collection Time: 04/19/21  8:45 PM   Result Value Ref Range    Phosphorus 2.6 2.6 - 4.7 mg/dL     [unfilled]      Review of Systems    Unable to obtain due to patient's current mental status      Physical Exam:      Constitutional:  Awake and alert. Feeding self. Confused at baseline from previous assessment. HENT:   Head: Normocephalic and atraumatic. Eyes: Pupils are equal, round, and reactive to light. Cardiovascular: Normal rate, regular rhythm and normal heart sounds. No peripheral edema  Pulmonary/Chest: Breath sounds normal. No wheezes. No rales. Exhibits no tenderness. No increased work of breathing, no cough noted on exam  Abdominal: Soft. Bowel sounds are normal. There is no abdominal tenderness. There is no rebound and no guarding. Musculoskeletal: Moves her extremities around while in the bed  Neurological: History of dementia, no seizure activity noted on exam    CT HEAD WO CONT   Final Result   Similar findings compared to CT head October 21, 2020. XR CHEST SNGL V   Final Result   No acute findings. Recent Results (from the past 24 hour(s))   CBC WITH AUTOMATED DIFF    Collection Time: 04/19/21  1:50 PM   Result Value Ref Range    WBC 4.7 3.6 - 11.0 K/uL    RBC 4.50 3.80 - 5.20 M/uL    HGB 13.6 11.5 - 16.0 g/dL    HCT 43.1 35.0 - 47.0 %    MCV 95.8 80.0 - 99.0 FL    MCH 30.2 26.0 - 34.0 PG    MCHC 31.6 30.0 - 36.5 g/dL    RDW 13.4 11.5 - 14.5 %    PLATELET 463 (L) 442 - 400 K/uL    MPV 12.1 8.9 - 12.9 FL    NEUTROPHILS 49 32 - 75 %    LYMPHOCYTES 40 12 - 49 %    MONOCYTES 8 5 - 13 %    EOSINOPHILS 2 0 - 7 %    BASOPHILS 0 0 - 1 %    IMMATURE GRANULOCYTES 0 0.0 - 0.5 %    ABS. IMM. GRANS. 0 0.00 - 0.04 K/UL    DF AUTOMATED     METABOLIC PANEL, COMPREHENSIVE    Collection Time: 04/19/21  1:50 PM   Result Value Ref Range    Sodium 138 136 - 145 mmol/L    Potassium 3.1 (L) 3.5 - 5.1 mmol/L    Chloride 105 97 - 108 mmol/L    CO2 27 21 - 32 mmol/L    Anion gap 6 5 - 15 mmol/L    Glucose 88 65 - 100 mg/dL    BUN 11 6 - 20 mg/dL    Creatinine 1.34 (H) 0.55 - 1.02 mg/dL    BUN/Creatinine ratio 8 (L) 12 - 20      GFR est AA 47 (L) >60 ml/min/1.73m2    GFR est non-AA 39 (L) >60 ml/min/1.73m2    Calcium 8.1 (L) 8.5 - 10.1 mg/dL    Bilirubin, total 0.5 0.2 - 1.0 mg/dL    AST (SGOT) 15 15 - 37 U/L    ALT (SGPT) 11 (L) 12 - 78 U/L    Alk.  phosphatase 131 (H) 45 - 117 U/L    Protein, total 6.9 6.4 - 8.2 g/dL    Albumin 3.0 (L) 3.5 - 5.0 g/dL    Globulin 3.9 2.0 - 4.0 g/dL    A-G Ratio 0.8 (L) 1.1 - 2.2     URINALYSIS W/ REFLEX CULTURE    Collection Time: 04/19/21  2:53 PM    Specimen: Urine   Result Value Ref Range    Color Yellow/Straw      Appearance Turbid (A) Clear      Specific gravity 1.009 1.003 - 1.030      pH (UA) 7.0 5.0 - 8.0 Protein Negative Negative mg/dL    Glucose Negative Negative mg/dL    Ketone Negative Negative mg/dL    Bilirubin Negative Negative      Blood Small (A) Negative      Urobilinogen 0.1 0.1 - 1.0 EU/dL    Nitrites Negative Negative      Leukocyte Esterase Negative Negative      UA:UC IF INDICATED Culture not indicated by UA result Culture not indicated by UA result      WBC 5-10 0 - 4 /hpf    RBC 5-10 0 - 5 /hpf    Bacteria 2+ (A) Negative /hpf    Mucus Trace /lpf   COVID-19 RAPID TEST    Collection Time: 04/19/21  7:10 PM   Result Value Ref Range    Specimen source Nasopharyngeal      COVID-19 rapid test Not Detected Not Detected     SARS-COV-2    Collection Time: 04/19/21  7:10 PM   Result Value Ref Range    SARS-CoV-2 Please find results under separate order     MAGNESIUM    Collection Time: 04/19/21  8:45 PM   Result Value Ref Range    Magnesium 1.8 1.6 - 2.4 mg/dL   PHOSPHORUS    Collection Time: 04/19/21  8:45 PM   Result Value Ref Range    Phosphorus 2.6 2.6 - 4.7 mg/dL       Results     Procedure Component Value Units Date/Time    CULTURE, URINE [[id number]]     Order Status: Sent Specimen: Urine from Clean catch     COVID-19 RAPID TEST [[id number]] Collected: 04/19/21 1910    Order Status: Completed Specimen: Nasopharyngeal Updated: 04/19/21 2028     Specimen source Nasopharyngeal        COVID-19 rapid test Not Detected        Comment: Rapid Abbott ID Now   Rapid NAAT:  The specimen is NEGATIVE for SARS-CoV-2, the novel coronavirus associated with COVID-19. Negative results should be treated as presumptive and, if inconsistent with clinical signs and symptoms or necessary for patient management, should be tested with an alternative molecular assay. Negative results do not preclude SARS-CoV-2 infection and should not be used as the sole basis for patient management decisions. This test has been authorized by the FDA under   an Emergency Use Authorization (EUA) for use by authorized laboratories.  Fact sheet for Healthcare Providers: ConventionUpdate.co.nz Fact sheet for Patients: ConventionUpdate.co.nz   Methodology: Isothermal Nucleic Acid Amplification                Labs:     Recent Labs     04/19/21  1350   WBC 4.7   HGB 13.6   HCT 43.1   *     Recent Labs     04/19/21 2045 04/19/21  1350   NA  --  138   K  --  3.1*   CL  --  105   CO2  --  27   BUN  --  11   CREA  --  1.34*   GLU  --  88   CA  --  8.1*   MG 1.8  --    PHOS 2.6  --      Recent Labs     04/19/21  1350   ALT 11*   *   TBILI 0.5   TP 6.9   ALB 3.0*   GLOB 3.9     No results for input(s): INR, PTP, APTT, INREXT in the last 72 hours. No results for input(s): FE, TIBC, PSAT, FERR in the last 72 hours. No results found for: FOL, RBCF   No results for input(s): PH, PCO2, PO2 in the last 72 hours. No results for input(s): CPK, CKNDX, TROIQ in the last 72 hours.     No lab exists for component: CPKMB  No results found for: CHOL, CHOLX, CHLST, CHOLV, HDL, HDLP, LDL, LDLC, DLDLP, TGLX, TRIGL, TRIGP, CHHD, CHHDX  Lab Results   Component Value Date/Time    Glucose (POC) 224 (H) 10/28/2020 11:42 AM    Glucose (POC) 106 (H) 10/28/2020 08:23 AM    Glucose (POC) 141 (H) 10/23/2020 07:23 AM    Glucose (POC) 338 (H) 10/22/2020 04:51 AM    Glucose (POC) 106 (H) 10/20/2020 10:01 AM     Lab Results   Component Value Date/Time    Color Yellow/Straw 04/19/2021 02:53 PM    Appearance Turbid (A) 04/19/2021 02:53 PM    Specific gravity 1.009 04/19/2021 02:53 PM    Specific gravity 1.012 11/30/2013 04:50 AM    pH (UA) 7.0 04/19/2021 02:53 PM    Protein Negative 04/19/2021 02:53 PM    Glucose Negative 04/19/2021 02:53 PM    Ketone Negative 04/19/2021 02:53 PM    Bilirubin Negative 04/19/2021 02:53 PM    Urobilinogen 0.1 04/19/2021 02:53 PM    Nitrites Negative 04/19/2021 02:53 PM    Leukocyte Esterase Negative 04/19/2021 02:53 PM    Epithelial cells FEW 11/30/2013 04:50 AM    Bacteria 2+ (A) 04/19/2021 02:53 PM WBC 5-10 04/19/2021 02:53 PM    RBC 5-10 04/19/2021 02:53 PM         Assessment:     Seizures  Hypokalemia -this morning's labs are pending  Hypocalcemia -this morning's labs are pending  CKD likely stage II  History of craniotomy  Bacteria on urineawaiting culture -remains afebrile, CBC pending  History of hypertension  History of hyperlipidemia  History of dementia    Plan:     Continue telemetry monitoring  Neurology is following  Continue seizure precautions and fall precautions  Await Keppra and Lamictal level, prolactin and TSH  Continue Keppra and Lamictal  Await labs this morning  Received p.o. potassium yesterday  Phosphorus and magnesium normal yesterday  Await urine culture  A.m. labs ordered for tomorrow  Awaiting physical therapy and Occupational Therapy consultations  Consult nephrology if no improvement in renal function  Ativan as needed for seizure activity      Patient is full code this admission    Patient is on Xarelto for anticoagulation  Calcium carbonate for GI    Case management is available for discharge planning back to skilled nursing facility when ready for discharge    Discussed with Dr. Yonatan Koch      Current Facility-Administered Medications:     amLODIPine (NORVASC) tablet 10 mg, 10 mg, Oral, DAILY, Ehsan Dailey MD, 10 mg at 04/20/21 0117    levETIRAcetam (KEPPRA) tablet 1,000 mg, 1,000 mg, Oral, BID, Ehsan Dailey MD, 1,000 mg at 04/20/21 1025    lamoTRIgine (LaMICtal) tablet 100 mg, 100 mg, Oral, BID, Ehsan Dailey MD, 100 mg at 04/20/21 1025    0.9% sodium chloride infusion, 75 mL/hr, IntraVENous, CONTINUOUS, Jayson Mtz DO, Last Rate: 75 mL/hr at 04/19/21 2131, 75 mL/hr at 04/19/21 2131    sodium chloride (NS) flush 5-40 mL, 5-40 mL, IntraVENous, Q8H, Rekha Camacho NP, 10 mL at 04/20/21 0528    sodium chloride (NS) flush 5-40 mL, 5-40 mL, IntraVENous, PRN, Rekha Camacho NP    acetaminophen (TYLENOL) tablet 650 mg, 650 mg, Oral, Q6H PRN **OR** acetaminophen (TYLENOL) suppository 650 mg, 650 mg, Rectal, Q6H PRN, Ramandeep Ribera, ANTHONY    polyethylene glycol (MIRALAX) packet 17 g, 17 g, Oral, DAILY PRN, Ramandeep Ribera NP    promethazine (PHENERGAN) tablet 12.5 mg, 12.5 mg, Oral, Q6H PRN **OR** ondansetron (ZOFRAN) injection 4 mg, 4 mg, IntraVENous, Q6H PRN, Ramandeep Ribera, ANTHONY    LORazepam (ATIVAN) injection 2 mg, 2 mg, IntraVENous, Q4H PRN, Ramandeep Ribera, NP

## 2021-04-20 NOTE — ED NOTES
TRANSFER - OUT REPORT:    Verbal report given to Leigh leonarda Umana Kenneth  being transferred to [place] [id number] for routine progression of care       Report consisted of patients Situation, Background, Assessment and   Recommendations(SBAR). Information from the following report(s) SBAR, ED Summary and MAR was reviewed with the receiving nurse. Lines:   Peripheral IV 04/19/21 Right Antecubital (Active)        Opportunity for questions and clarification was provided.       Patient transported with:   Registered Nurse

## 2021-04-20 NOTE — CONSULTS
NEURO CONSULT      REASON FOR ADMISSION:  Seizures      HISTORY:  Ms. [employer] is 69years old with a history of seizures, stroke, dementia, chronic kidney disease, craniotomy for treatment of an aneurysm, who lives at a skilled care facility and was admitted for having had repeated seizures at the facility. Patient was subsequently brought to the ER. In the ER she was stabilized and transferred to the floor. She has not had any seizures since being on the floor. She is on Lamictal and Keppra at the facility. Patient is a poor historian level Keppra or lamotrigine level at this time.           ROS: Unreliable    General:                     No fever, no chills, no sweats, no generalized weakness, no weight loss/gain,                                       No loss of appetite   Eyes:                           No blurred vision, no eye pain, no loss of vision, no double vision  ENT:                            rhinorrhea, no pharyngitis   Respiratory:               No cough, no sputum production, no SOB, no CANALES, no wheezing, no pleuritic pain   Cardiology:                No chest pain, no palpitations, no orthopnea, no PND, no edema, no syncope   Gastrointestinal:       No abdominal pain , no N/V, no diarrhea, no dysphagia, no constipation, no bleeding   Genitourinary:           frequency, no urgency, no dysuria, no hematuria, no incontinence   Muskuloskeletal :      No arthralgia, no myalgia, no back pain  Hematology:              No easy bruising, no nose or gum bleeding, no lymphadenopathy   Dermatological:         No rash, no ulceration, no pruritis, no color change / jaundice  Endocrine:                 hot flashes or polydipsia   Neurological:             No headache, no dizziness, no confusion, no focal weakness, no paresthesia,                                      No Speech difficulties, no memory loss, no gait difficulty  Psychological:          No neelings of anxiety, no depression, no agitation      NEURO EXAM:    Mental status: Patient responds verbally but incoherently and is a poor historian. She also confabulates occasionally. She did follow one-step commands once by raising arms. Cranial nerves: There is no central or peripheral seventh palsy. Corneal and pupillary reflexes are intact    Motor exam: Patient is diffusely weak. There is no focality    Sensory exam: Patient reacts to pain. Romberg was not tested    Coordination: Coordination is poor    Gait and Station: Patient was not ambulated    ASSESSMENT:  Recurrent seizures in a patient with a history of seizures. Etiology of the recurrent seizures need to be addressed. Rule out subtherapeutic doses of antiseizure medication  Rule out noncompliance  Rule out intercurrent illness      PLAN:  Any abnormalities or intercurrent illnesses being addressed  Seizure precautions  Continue Keppra and Lamictal  Stat Keppra and Lamictal level  Prolactin.   TSH      ALLERGIES:    No Known Allergies    MEDS:      Current Facility-Administered Medications:     amLODIPine (NORVASC) tablet 10 mg, 10 mg, Oral, DAILY, Ehsan Dailey MD, 10 mg at 04/20/21 0117    levETIRAcetam (KEPPRA) tablet 1,000 mg, 1,000 mg, Oral, BID, Chandrika Dailey MD    lamoTRIgine (LaMICtal) tablet 100 mg, 100 mg, Oral, BID, Chandrika Dailey MD    0.9% sodium chloride infusion, 75 mL/hr, IntraVENous, CONTINUOUS, [place]ita Plane, DO, Last Rate: 75 mL/hr at 04/19/21 2131, 75 mL/hr at 04/19/21 2131    sodium chloride (NS) flush 5-40 mL, 5-40 mL, IntraVENous, Q8H, Leesa Avelar NP, 10 mL at 04/20/21 0528    sodium chloride (NS) flush 5-40 mL, 5-40 mL, IntraVENous, PRN, Leesa Avelar NP    acetaminophen (TYLENOL) tablet 650 mg, 650 mg, Oral, Q6H PRN **OR** acetaminophen (TYLENOL) suppository 650 mg, 650 mg, Rectal, Q6H PRN, Leesa Avelar NP    polyethylene glycol (MIRALAX) packet 17 g, 17 g, Oral, DAILY PRN, Leesa Breeze, NP    promethazine (PHENERGAN) tablet 12.5 mg, 12.5 mg, Oral, Q6H PRN **OR** ondansetron (ZOFRAN) injection 4 mg, 4 mg, IntraVENous, Q6H PRN, Clarine Saroj, NP    LORazepam (ATIVAN) injection 2 mg, 2 mg, IntraVENous, Q4H PRN, Clarine Saroj, NP    LABS:  Recent Results (from the past 24 hour(s))   CBC WITH AUTOMATED DIFF    Collection Time: 04/19/21  1:50 PM   Result Value Ref Range    WBC 4.7 3.6 - 11.0 K/uL    RBC 4.50 3.80 - 5.20 M/uL    HGB 13.6 11.5 - 16.0 g/dL    HCT 43.1 35.0 - 47.0 %    MCV 95.8 80.0 - 99.0 FL    MCH 30.2 26.0 - 34.0 PG    MCHC 31.6 30.0 - 36.5 g/dL    RDW 13.4 11.5 - 14.5 %    PLATELET 707 (L) 052 - 400 K/uL    MPV 12.1 8.9 - 12.9 FL    NEUTROPHILS 49 32 - 75 %    LYMPHOCYTES 40 12 - 49 %    MONOCYTES 8 5 - 13 %    EOSINOPHILS 2 0 - 7 %    BASOPHILS 0 0 - 1 %    IMMATURE GRANULOCYTES 0 0.0 - 0.5 %    ABS. IMM. GRANS. 0 0.00 - 0.04 K/UL    DF AUTOMATED     METABOLIC PANEL, COMPREHENSIVE    Collection Time: 04/19/21  1:50 PM   Result Value Ref Range    Sodium 138 136 - 145 mmol/L    Potassium 3.1 (L) 3.5 - 5.1 mmol/L    Chloride 105 97 - 108 mmol/L    CO2 27 21 - 32 mmol/L    Anion gap 6 5 - 15 mmol/L    Glucose 88 65 - 100 mg/dL    BUN 11 6 - 20 mg/dL    Creatinine 1.34 (H) 0.55 - 1.02 mg/dL    BUN/Creatinine ratio 8 (L) 12 - 20      GFR est AA 47 (L) >60 ml/min/1.73m2    GFR est non-AA 39 (L) >60 ml/min/1.73m2    Calcium 8.1 (L) 8.5 - 10.1 mg/dL    Bilirubin, total 0.5 0.2 - 1.0 mg/dL    AST (SGOT) 15 15 - 37 U/L    ALT (SGPT) 11 (L) 12 - 78 U/L    Alk.  phosphatase 131 (H) 45 - 117 U/L    Protein, total 6.9 6.4 - 8.2 g/dL    Albumin 3.0 (L) 3.5 - 5.0 g/dL    Globulin 3.9 2.0 - 4.0 g/dL    A-G Ratio 0.8 (L) 1.1 - 2.2     URINALYSIS W/ REFLEX CULTURE    Collection Time: 04/19/21  2:53 PM    Specimen: Urine   Result Value Ref Range    Color Yellow/Straw      Appearance Turbid (A) Clear      Specific gravity 1.009 1.003 - 1.030      pH (UA) 7.0 5.0 - 8.0      Protein Negative Negative mg/dL    Glucose Negative Negative mg/dL    Ketone Negative Negative mg/dL    Bilirubin Negative Negative      Blood Small (A) Negative      Urobilinogen 0.1 0.1 - 1.0 EU/dL    Nitrites Negative Negative      Leukocyte Esterase Negative Negative      UA:UC IF INDICATED Culture not indicated by UA result Culture not indicated by UA result      WBC 5-10 0 - 4 /hpf    RBC 5-10 0 - 5 /hpf    Bacteria 2+ (A) Negative /hpf    Mucus Trace /lpf   COVID-19 RAPID TEST    Collection Time: 04/19/21  7:10 PM   Result Value Ref Range    Specimen source Nasopharyngeal      COVID-19 rapid test Not Detected Not Detected     SARS-COV-2    Collection Time: 04/19/21  7:10 PM   Result Value Ref Range    SARS-CoV-2 Please find results under separate order     MAGNESIUM    Collection Time: 04/19/21  8:45 PM   Result Value Ref Range    Magnesium 1.8 1.6 - 2.4 mg/dL   PHOSPHORUS    Collection Time: 04/19/21  8:45 PM   Result Value Ref Range    Phosphorus 2.6 2.6 - 4.7 mg/dL       Visit Vitals  BP (!) 176/101   Pulse 75   Temp 98.6 °F (37 °C)   Resp 16   Ht 5' 7\" (1.702 m)   Wt 59 kg (130 lb)   SpO2 98%   BMI 20.36 kg/m²       Imaging:  CT HEAD WO CONT   Final Result   Similar findings compared to CT head October 21, 2020. XR CHEST SNGL V   Final Result   No acute findings.

## 2021-04-20 NOTE — H&P
History and Physical    NAME: Kacie Kumar   :  1950   MRN:  [id number]     Date/Time:  2021 8:39 PM    Patient PCP: Ashley Vilchis MD  ______________________________________________________________________             Subjective:     CHIEF COMPLAINT:     Seizure    HISTORY OF PRESENT ILLNESS:       Patient is a 69y.o. year old female has medical history significant for seizures, craniotomy post aneurysm, CVA, dementia, CKD, hypertension presented from the skilled nursing facility for seizure activity today. Per report she had had multiple seizures over the last 24 hours. She takes lamotrigine and Keppra which is administered by the skilled nursing facility staff. She is a poor historian due to her dementia and is not cooperative with exam questions. In the ED she received a bolus of Keppra. CBC was unremarkable. She was hypokalemic on chemistry. CT of her head was consistent with prior CTs with no acute process. Chest x-ray is negative. Creatinine was 1.34 with last recorded value of 1.18 in January of this year her GFR was 52 with her last 1 being 54. Urinalysis with 2+ bacteria but negative for nitrites or leukocyte esterase. Added culture on at this time. She is afebrile with no leukocytosis. On exam there is no seizure activity noted. She does not answer questions appropriately. Does not show evidence of any pain or acute distress. No past medical history on file. CVA, seizures, dementia, chronic kidney disease, hypertension, hyperlipidemia, craniotomy, aneurysm    No past surgical history on file. Craniotomy    Social History     Tobacco Use    Smoking status: Not on file   Substance Use Topics    Alcohol use: Not on file        No family history on file. No Known Allergies     Prior to Admission medications    Medication Sig Start Date End Date Taking? Authorizing Provider   hydrocortisone (Anusol-HC) 2.5 % rectal cream Insert  into rectum four (4) times daily. 1/13/21   Alessandro Fernandes MD   senna (Senna) 8.6 mg tablet Take 1 Tab by mouth daily. 1/13/21   Alessandro Fernandes MD   calcium carbonate (OS-GIN) 500 mg calcium (1,250 mg) tablet Take 2 Tabs by mouth every six (6) hours as needed for Indigestion. Provider, Historical   ferrous sulfate 300 mg (60 mg iron)/5 mL syrup Take  by mouth three (3) times daily. Provider, Historical   lamoTRIgine (LaMICtal) 100 mg tablet Take 100 mg by mouth two (2) times a day. Indications: convulsive seizures    Provider, Historical   Saccharomyces boulardii (Florastor) 250 mg capsule Take 250 mg by mouth two (2) times a day. Provider, Historical   rivaroxaban (Xarelto) 20 mg tab tablet Take 20 mg by mouth daily (with dinner). Indications: treatment to prevent recurrence of a clot in a deep vein    Provider, Historical   levETIRAcetam (Keppra) 500 mg tablet Take 1 Tab by mouth two (2) times a day.  10/23/20   Anahy Marshall MD         Current Facility-Administered Medications:     0.9% sodium chloride infusion, 75 mL/hr, IntraVENous, CONTINUOUS, Jayson Mtz,     potassium chloride (KLOR-CON) packet for solution 40 mEq, 40 mEq, Oral, NOW, [facility], NP    sodium chloride (NS) flush 5-40 mL, 5-40 mL, IntraVENous, Q8H, [facility], NP    sodium chloride (NS) flush 5-40 mL, 5-40 mL, IntraVENous, PRN, [facility], NP    acetaminophen (TYLENOL) tablet 650 mg, 650 mg, Oral, Q6H PRN **OR** acetaminophen (TYLENOL) suppository 650 mg, 650 mg, Rectal, Q6H PRN, [facility], NP    polyethylene glycol (MIRALAX) packet 17 g, 17 g, Oral, DAILY PRN, [facility], NP    promethazine (PHENERGAN) tablet 12.5 mg, 12.5 mg, Oral, Q6H PRN **OR** ondansetron (ZOFRAN) injection 4 mg, 4 mg, IntraVENous, Q6H PRN, [facility], NP    LORazepam (ATIVAN) injection 2 mg, 2 mg, IntraVENous, Q4H PRN, Cr Mercado, NP    Current Outpatient Medications:     hydrocortisone (Anusol-HC) 2.5 % rectal cream, Insert into rectum four (4) times daily. , Disp: 30 g, Rfl: 0    senna (Senna) 8.6 mg tablet, Take 1 Tab by mouth daily. , Disp: 30 Tab, Rfl: 0    calcium carbonate (OS-GIN) 500 mg calcium (1,250 mg) tablet, Take 2 Tabs by mouth every six (6) hours as needed for Indigestion. , Disp: , Rfl:     ferrous sulfate 300 mg (60 mg iron)/5 mL syrup, Take  by mouth three (3) times daily. , Disp: , Rfl:     lamoTRIgine (LaMICtal) 100 mg tablet, Take 100 mg by mouth two (2) times a day. Indications: convulsive seizures, Disp: , Rfl:     Saccharomyces boulardii (Florastor) 250 mg capsule, Take 250 mg by mouth two (2) times a day., Disp: , Rfl:     rivaroxaban (Xarelto) 20 mg tab tablet, Take 20 mg by mouth daily (with dinner). Indications: treatment to prevent recurrence of a clot in a deep vein, Disp: , Rfl:     levETIRAcetam (Keppra) 500 mg tablet, Take 1 Tab by mouth two (2) times a day., Disp: 60 Tab, Rfl: 0    LAB DATA REVIEWED:    Recent Results (from the past 24 hour(s))   CBC WITH AUTOMATED DIFF    Collection Time: 04/19/21  1:50 PM   Result Value Ref Range    WBC 4.7 3.6 - 11.0 K/uL    RBC 4.50 3.80 - 5.20 M/uL    HGB 13.6 11.5 - 16.0 g/dL    HCT 43.1 35.0 - 47.0 %    MCV 95.8 80.0 - 99.0 FL    MCH 30.2 26.0 - 34.0 PG    MCHC 31.6 30.0 - 36.5 g/dL    RDW 13.4 11.5 - 14.5 %    PLATELET 710 (L) 245 - 400 K/uL    MPV 12.1 8.9 - 12.9 FL    NEUTROPHILS 49 32 - 75 %    LYMPHOCYTES 40 12 - 49 %    MONOCYTES 8 5 - 13 %    EOSINOPHILS 2 0 - 7 %    BASOPHILS 0 0 - 1 %    IMMATURE GRANULOCYTES 0 0.0 - 0.5 %    ABS. IMM.  GRANS. 0 0.00 - 0.04 K/UL    DF AUTOMATED     METABOLIC PANEL, COMPREHENSIVE    Collection Time: 04/19/21  1:50 PM   Result Value Ref Range    Sodium 138 136 - 145 mmol/L    Potassium 3.1 (L) 3.5 - 5.1 mmol/L    Chloride 105 97 - 108 mmol/L    CO2 27 21 - 32 mmol/L    Anion gap 6 5 - 15 mmol/L    Glucose 88 65 - 100 mg/dL    BUN 11 6 - 20 mg/dL    Creatinine 1.34 (H) 0.55 - 1.02 mg/dL    BUN/Creatinine ratio 8 (L) 12 - 20      GFR est AA 47 (L) >60 ml/min/1.73m2    GFR est non-AA 39 (L) >60 ml/min/1.73m2    Calcium 8.1 (L) 8.5 - 10.1 mg/dL    Bilirubin, total 0.5 0.2 - 1.0 mg/dL    AST (SGOT) 15 15 - 37 U/L    ALT (SGPT) 11 (L) 12 - 78 U/L    Alk. phosphatase 131 (H) 45 - 117 U/L    Protein, total 6.9 6.4 - 8.2 g/dL    Albumin 3.0 (L) 3.5 - 5.0 g/dL    Globulin 3.9 2.0 - 4.0 g/dL    A-G Ratio 0.8 (L) 1.1 - 2.2     URINALYSIS W/ REFLEX CULTURE    Collection Time: 04/19/21  2:53 PM    Specimen: Urine   Result Value Ref Range    Color Yellow/Straw      Appearance Turbid (A) Clear      Specific gravity 1.009 1.003 - 1.030      pH (UA) 7.0 5.0 - 8.0      Protein Negative Negative mg/dL    Glucose Negative Negative mg/dL    Ketone Negative Negative mg/dL    Bilirubin Negative Negative      Blood Small (A) Negative      Urobilinogen 0.1 0.1 - 1.0 EU/dL    Nitrites Negative Negative      Leukocyte Esterase Negative Negative      UA:UC IF INDICATED Culture not indicated by UA result Culture not indicated by UA result      WBC 5-10 0 - 4 /hpf    RBC 5-10 0 - 5 /hpf    Bacteria 2+ (A) Negative /hpf    Mucus Trace /lpf   COVID-19 RAPID TEST    Collection Time: 04/19/21  7:10 PM   Result Value Ref Range    Specimen source Nasopharyngeal      COVID-19 rapid test Not Detected Not Detected     SARS-COV-2    Collection Time: 04/19/21  7:10 PM   Result Value Ref Range    SARS-CoV-2 Please find results under separate order         XR Results (most recent):  Results from Hospital Encounter encounter on 04/19/21   XR CHEST SNGL V    Narrative Multiple seizures. Comparison chest x-ray 4/24/2020. FINDINGS: Frontal single view of the chest. Normal heart size. Calcified aorta. No vascular congestion or pulmonary edema. The lungs are similarly inflated. Left lower lung scarring. No infiltrate, effusion, pneumothorax. No free air  under diaphragm. Bony structures intact. Impression No acute findings.           CT HEAD WO CONT   Final Result Similar findings compared to CT head October 21, 2020. XR CHEST SNGL V   Final Result   No acute findings. Review of Systems:  Unable to obtain due to mental status    Objective:   VITALS:    Visit Vitals  BP (!) 146/89 (BP 1 Location: Left arm, BP Patient Position: At rest)   Pulse 74   Temp 97.7 °F (36.5 °C)   Resp 16   Ht 5' 7\" (1.702 m)   Wt 59 kg (130 lb)   SpO2 96%   BMI 20.36 kg/m²       Physical Exam:   Constitutional: Confused, does not answer questions appropriately history of dementia  HENT:   Head: Normocephalic and atraumatic. Eyes: Pupils are equal, round, and reactive to light. Cardiovascular: Normal rate, regular rhythm and normal heart sounds. No peripheral edema  Pulmonary/Chest: Breath sounds normal. No wheezes. No rales. Exhibits no tenderness. No increased work of breathing, no cough noted on exam  Abdominal: Soft. Bowel sounds are normal. There is no abdominal tenderness. There is no rebound and no guarding.    Musculoskeletal: Moves her extremities around while in the bed  Neurological: History of dementia, no seizure activity noted on exam      ASSESSMENT & PLAN:  Seizures  Hypokalemia  Hypocalcemia  CKD likely stage II  History of craniotomy  Bacteria on urineawaiting culture  History of hypertension  History of hyperlipidemia  History of dementia      Admit to telemetry  Neurological consult  Await Keppra level  Resume Keppra and Lamictal  Resume home Xarelto  Coags ordered  Await urine culture  Seizure precautions  Fall precautions  Physical therapy and Occupational Therapy consults  AM labs ordered  Monitor renal function -nephrology consult as needed  Replete potassium and recheck in the morning  Check magnesium and phosphorus  As needed Ativan available    Patient is on Xarelto for anticoagulation    Resumed home calcium carbonate for indigestion for GI prophylaxis    Patient is from skilled nursing facility and will return upon readiness for discharge    Discussed with Dr. Rivero Reach    Patient is full code this admission      ________________________________________________________________________    Signed: Jeana Tariq NP

## 2021-04-21 LAB
ALBUMIN SERPL-MCNC: 2.8 G/DL (ref 3.5–5)
ALBUMIN/GLOB SERPL: 0.7 {RATIO} (ref 1.1–2.2)
ALP SERPL-CCNC: 132 U/L (ref 45–117)
ALT SERPL-CCNC: 17 U/L (ref 12–78)
ANION GAP SERPL CALC-SCNC: 10 MMOL/L (ref 5–15)
AST SERPL W P-5'-P-CCNC: 29 U/L (ref 15–37)
BASOPHILS # BLD: 0 K/UL (ref 0–0.1)
BASOPHILS NFR BLD: 0 % (ref 0–1)
BILIRUB SERPL-MCNC: 0.6 MG/DL (ref 0.2–1)
BNP SERPL-MCNC: 424 PG/ML
BUN SERPL-MCNC: 11 MG/DL (ref 6–20)
BUN/CREAT SERPL: 12 (ref 12–20)
CA-I BLD-MCNC: 8.4 MG/DL (ref 8.5–10.1)
CHLORIDE SERPL-SCNC: 105 MMOL/L (ref 97–108)
CO2 SERPL-SCNC: 20 MMOL/L (ref 21–32)
CREAT SERPL-MCNC: 0.94 MG/DL (ref 0.55–1.02)
DIFFERENTIAL METHOD BLD: ABNORMAL
EOSINOPHIL # BLD: 0.1 K/UL (ref 0–0.4)
EOSINOPHIL NFR BLD: 2 % (ref 0–7)
ERYTHROCYTE [DISTWIDTH] IN BLOOD BY AUTOMATED COUNT: 13.2 % (ref 11.5–14.5)
GLOBULIN SER CALC-MCNC: 4.3 G/DL (ref 2–4)
GLUCOSE SERPL-MCNC: 79 MG/DL (ref 65–100)
HCT VFR BLD AUTO: 49.7 % (ref 35–47)
HGB BLD-MCNC: 16.3 G/DL (ref 11.5–16)
IMM GRANULOCYTES # BLD AUTO: 0 K/UL (ref 0–0.04)
IMM GRANULOCYTES NFR BLD AUTO: 1 % (ref 0–0.5)
LYMPHOCYTES # BLD: 1.5 K/UL (ref 0.8–3.5)
LYMPHOCYTES NFR BLD: 34 % (ref 12–49)
MAGNESIUM SERPL-MCNC: 1.9 MG/DL (ref 1.6–2.4)
MCH RBC QN AUTO: 30.5 PG (ref 26–34)
MCHC RBC AUTO-ENTMCNC: 32.8 G/DL (ref 30–36.5)
MCV RBC AUTO: 92.9 FL (ref 80–99)
MONOCYTES # BLD: 0.6 K/UL (ref 0–1)
MONOCYTES NFR BLD: 13 % (ref 5–13)
NEUTS SEG # BLD: 2.2 K/UL (ref 1.8–8)
NEUTS SEG NFR BLD: 50 % (ref 32–75)
NRBC # BLD: 0 K/UL (ref 0–0.01)
NRBC BLD-RTO: 0 PER 100 WBC
PLATELET # BLD AUTO: 128 K/UL (ref 150–400)
PMV BLD AUTO: 11.2 FL (ref 8.9–12.9)
POTASSIUM SERPL-SCNC: 3.3 MMOL/L (ref 3.5–5.1)
PROLACTIN SERPL-MCNC: 5.8 NG/ML
PROT SERPL-MCNC: 7.1 G/DL (ref 6.4–8.2)
RBC # BLD AUTO: 5.35 M/UL (ref 3.8–5.2)
SODIUM SERPL-SCNC: 135 MMOL/L (ref 136–145)
WBC # BLD AUTO: 4.4 K/UL (ref 3.6–11)

## 2021-04-21 PROCEDURE — 97530 THERAPEUTIC ACTIVITIES: CPT

## 2021-04-21 PROCEDURE — 74011250637 HC RX REV CODE- 250/637: Performed by: NURSE PRACTITIONER

## 2021-04-21 PROCEDURE — 74011250637 HC RX REV CODE- 250/637: Performed by: FAMILY MEDICINE

## 2021-04-21 PROCEDURE — 80053 COMPREHEN METABOLIC PANEL: CPT

## 2021-04-21 PROCEDURE — 99218 HC RM OBSERVATION: CPT

## 2021-04-21 PROCEDURE — 74011250636 HC RX REV CODE- 250/636: Performed by: PSYCHIATRY & NEUROLOGY

## 2021-04-21 PROCEDURE — 65270000029 HC RM PRIVATE

## 2021-04-21 PROCEDURE — 97165 OT EVAL LOW COMPLEX 30 MIN: CPT

## 2021-04-21 PROCEDURE — 83880 ASSAY OF NATRIURETIC PEPTIDE: CPT

## 2021-04-21 PROCEDURE — 85025 COMPLETE CBC W/AUTO DIFF WBC: CPT

## 2021-04-21 PROCEDURE — 97161 PT EVAL LOW COMPLEX 20 MIN: CPT

## 2021-04-21 PROCEDURE — 83735 ASSAY OF MAGNESIUM: CPT

## 2021-04-21 PROCEDURE — 74011250636 HC RX REV CODE- 250/636: Performed by: EMERGENCY MEDICINE

## 2021-04-21 PROCEDURE — 36415 COLL VENOUS BLD VENIPUNCTURE: CPT

## 2021-04-21 RX ORDER — POTASSIUM CHLORIDE 750 MG/1
40 TABLET, FILM COATED, EXTENDED RELEASE ORAL
Status: COMPLETED | OUTPATIENT
Start: 2021-04-21 | End: 2021-04-21

## 2021-04-21 RX ORDER — LORAZEPAM 2 MG/ML
1 INJECTION INTRAMUSCULAR ONCE
Status: COMPLETED | OUTPATIENT
Start: 2021-04-21 | End: 2021-04-21

## 2021-04-21 RX ADMIN — Medication 10 ML: at 22:00

## 2021-04-21 RX ADMIN — Medication 250 MG: at 20:28

## 2021-04-21 RX ADMIN — SODIUM CHLORIDE 75 ML/HR: 9 INJECTION, SOLUTION INTRAVENOUS at 18:32

## 2021-04-21 RX ADMIN — LORAZEPAM 1 MG: 2 INJECTION, SOLUTION INTRAMUSCULAR; INTRAVENOUS at 20:29

## 2021-04-21 RX ADMIN — Medication 250 MG: at 08:07

## 2021-04-21 RX ADMIN — LAMOTRIGINE 100 MG: 100 TABLET ORAL at 08:07

## 2021-04-21 RX ADMIN — Medication 10 ML: at 15:27

## 2021-04-21 RX ADMIN — MINERAL SUPPLEMENT IRON 300 MG / 5 ML STRENGTH LIQUID 100 PER BOX UNFLAVORED 300 MG: at 18:28

## 2021-04-21 RX ADMIN — Medication 10 ML: at 06:40

## 2021-04-21 RX ADMIN — AMLODIPINE BESYLATE 10 MG: 5 TABLET ORAL at 08:07

## 2021-04-21 RX ADMIN — RIVAROXABAN 20 MG: 20 TABLET, FILM COATED ORAL at 18:28

## 2021-04-21 RX ADMIN — LEVETIRACETAM 1000 MG: 500 TABLET ORAL at 20:28

## 2021-04-21 RX ADMIN — MINERAL SUPPLEMENT IRON 300 MG / 5 ML STRENGTH LIQUID 100 PER BOX UNFLAVORED 300 MG: at 08:00

## 2021-04-21 RX ADMIN — LAMOTRIGINE 100 MG: 100 TABLET ORAL at 20:28

## 2021-04-21 RX ADMIN — LEVETIRACETAM 1000 MG: 500 TABLET ORAL at 08:07

## 2021-04-21 RX ADMIN — POTASSIUM CHLORIDE 40 MEQ: 750 TABLET, FILM COATED, EXTENDED RELEASE ORAL at 18:28

## 2021-04-21 NOTE — PROGRESS NOTES
OCCUPATIONAL THERAPY EVALUATION  Patient: Bailee King (1 y.o. female)  Date: 4/21/2021  Primary Diagnosis: Seizures ([facility] [address] [R56.9]        Precautions: Fall risk       ASSESSMENT  Pt is a 70 y/o F with PMH of seizures, CVA, dementia, chronic kidney disease, hypertension, hyperlipidemia, craniotomy post aneurysm, and dementia presented to [facility] from the SNF for seizure activity. Per report pt had multiple seizures over the last 24 hours. She takes lamotrigine and Keppra which is administered by the SNF staff. Pt is a poor historian due to her dementia and is not cooperative with exam questions. In the ED she received a bolus of Keppra. CBC was unremarkable. She was hypokalemic on chemistry. CT of her head was consistent with prior CTs with no acute process. XR is negative. Pt admitted 4/19/21 and being treated for seizures. Pt received semi-supine in bed upon arrival, AXO to self only, confused yet pleasant and agreeable to OT evaluation at this time. Per medical chart, pt resides at [facility]. Per pt, she requires assist with ADLs and is ambulatory with SPC at [facility], however unclear of accuracy of information at this time as pt is noted to be a poor historian, will need to confirm. Based on current observations, pt presents with deficits in generalized strength/AROM, dynamic sitting balance, static/dynamic standing balance, functional activity tolerance, and cognition/confusion impacting overall performance of ADLs and functional transfers/mobility. Pt currently requires total A to don socks in long sitting, mod A for rolling/supine>sit to EOB with good sitting balance with UE supported on bed rail. Pt noted to zone out briefly in sitting, however responsive to OT when spoken to her (RN made aware).  Pt denies any dizziness in sitting, performs STS with RW and mod-max A x1, tolerates approx 15 seconds, unable to take any steps at this time initiating sit back onto bedside with mod-max A for safety. Pt would benefit from Ax2 next session for OOB mobility. Pt returned to supine with mod A and performs basic grooming (washing face) s/ps setup. Overall, pt tolerates session fair today, Pt would benefit from continued skilled OT services to address current impairments and improve overall IND and safety with self cares and functional transfers/mobility. Recommend discharge to SNF once medically appropriate. Other factors to consider for discharge: family support, DME, time since onset, severity to deficits      Patient will benefit from skilled therapy intervention to address the above noted impairments. PLAN :  Recommendations and Planned Interventions: self care training, functional mobility training, therapeutic exercise, balance training, visual/perceptual training, therapeutic activities, endurance activities, patient education, and family training/education    Frequency/Duration: Patient will be followed by occupational therapy 4 times a week to address goals. Recommendation for discharge: (in order for the patient to meet his/her long term goals)  SNF    This discharge recommendation:  Has been made in collaboration with the attending provider and/or case management       SUBJECTIVE:   Patient stated the staff follows behind me with a walker at the nursing home    OBJECTIVE DATA SUMMARY:   HISTORY:   No past medical history on file. No past surgical history on file.     Expanded or extensive additional review of patient history:     Home Situation  Home Environment: [address] [place] [address] Name: [address]  [place] Residence: One story  Living Alone: No  Support Systems: Skilled nursing facility  Patient Expects to be Discharged to[de-identified] Skilled nursing facility  Current DME Used/Available at Home: [employer], rollator, Cane, straight    EXAMINATION OF PERFORMANCE DEFICITS:  Cognitive/Behavioral Status:  Neurologic State: Alert;Confused  Orientation Level: Oriented to person;Disoriented to place; Disoriented to situation;Disoriented to time  Cognition: Follows commands;Decreased attention/concentration    Hearing: Auditory  Auditory Impairment: None    Vision/Perceptual:     WFL      Range of Motion:  AROM: Generally decreased, functional    Strength:  Strength: Generally decreased, functional(Grossly 3 to 3+/5)    Coordination:  Coordination: Generally decreased, functional  Fine Motor Skills-Upper: Comment(Grossly decreased, functional)    Gross Motor Skills-Upper: Left Intact; Right Intact    Tone & Sensation:  Sensation: Intact    Balance:  Sitting: Impaired; With support  Sitting - Static: Fair (occasional)  Sitting - Dynamic: Fair (occasional)  Standing: Impaired;Pull to stand; With support  Standing - Static: Poor;Constant support    Functional Mobility and Transfers for ADLs:  Bed Mobility:  Rolling: Moderate assistance  Supine to Sit: Moderate assistance  Sit to Supine: Moderate assistance  Scooting: Moderate assistance    Transfers:  Sit to Stand: Moderate assistance;Maximum assistance  Stand to Sit: Moderate assistance;Maximum assistance    ADL Intervention and task modifications:  Grooming  Grooming Assistance: Set-up; Stand-by assistance  Position Performed: Long sitting on bed  Washing Face: Set-up; Stand-by assistance    Lower Body Dressing Assistance  Socks: Total assistance (dependent)  Position Performed: Long sitting on bed    Therapeutic Exercise:  Pt would benefit from UE HEP to improve overall UE AROM/strength and can be further educated in next treatment session. Functional Measure:    [facility] [address] [address] AM-PACTM \"6 Clicks\"                                                       Daily Activity Inpatient Short Form  How much help from another person does the patient currently need. .. Total; A Lot A Little None   1. Putting on and taking off regular lower body clothing? [x]  1 []  2 []  3 []  4   2. Bathing (including washing, rinsing, drying)?  []  1 [x]  2 [] 3 []  4   3. Toileting, which includes using toilet, bedpan or urinal? [x] 1 []  2 []  3 []  4   4. Putting on and taking off regular upper body clothing? []  1 [x]  2 []  3 []  4   5. Taking care of personal grooming such as brushing teeth? []  1 []  2 [x]  3 []  4   6. Eating meals? []  1 []  2 [x]  3 []  4   © , Trustees of [address] [place] [address] [address] [address], under license to [email or web address]. All rights reserved     Score:      Interpretation of Tool:  Represents clinically-significant functional categories (i.e. Activities of daily living). Percentage of Impairment CH    0%   CI    1-19% CJ    20-39% CK    40-59% CL    60-79% CM    80-99% CN     100%   [facility]  Score 6-24 24 23 20-22 15-19 10-14 7-9 6        Occupational Therapy Evaluation Charge Determination   History Examination Decision-Making   LOW Complexity : Brief history review  MEDIUM Complexity : 3-5 performance deficits relating to physical, cognitive , or psychosocial skils that result in activity limitations and / or participation restrictions MEDIUM Complexity : Patient may present with comorbidities that affect occupational performnce. Miniml to moderate modification of tasks or assistance (eg, physical or verbal ) with assesment(s) is necessary to enable patient to complete evaluation       Based on the above components, the patient evaluation is determined to be of the following complexity level: LOW   Pain Ratin/10    Activity Tolerance:   Fair    After treatment patient left in no apparent distress:    Supine in bed, Call bell within reach, Bed / chair alarm activated, and Side rails x 3, RN updated    COMMUNICATION/EDUCATION:   The patients plan of care was discussed with: Registered nurse. Patient/family agree to work toward stated goals and plan of care. This patients plan of care is appropriate for delegation to [facility].     Thank you for this referral.  Ángel Gill  Time Calculation: 27 mins   Problem: Self Care Deficits Care Plan (Adult)  Goal: *Acute Goals and Plan of Care (Insert Text)  Description: Pt will be SBA sup <> sit in prep for EOB ADLs  Pt will be SBA grooming sitting EOB  Pt will be min A LE dressing sitting EOB/long sit  Pt will be min A sit <>  prep for toileting LRAD  Pt will be min A toileting/toilet transfer/cloth mgmt LRAD  Pt will be SPV following UE HEP in prep for self care tasks  Outcome: Not Met

## 2021-04-21 NOTE — PROGRESS NOTES
NEURO PROGRESS NOTE        SUBJECTIVE:   Seizures  Beclouded dementia        EXAM:  Awake, confabulates intermittently  No seizures reported  Currently      ASSESSMENT/PLAN:  Continue Keppra Lamictal  We will check neurophysiology    ALLERGIES:    No Known Allergies    MEDS:      Current Facility-Administered Medications:     calcium carbonate (TUMS) chewable tablet 400 mg [elemental], 400 mg, Oral, TID PRN, Alyne Later, NP    rivaroxaban (XARELTO) tablet 20 mg, 20 mg, Oral, DAILY WITH DINNER, Aljaime Later, NP, 20 mg at 04/20/21 1705    ferrous sulfate 300 mg (60 mg iron)/5 mL oral syrup 300 mg, 300 mg, Oral, BID WITH MEALS, Aljaime Later, NP, 300 mg at 04/20/21 1705    amLODIPine (NORVASC) tablet 10 mg, 10 mg, Oral, DAILY, Ehsan Dailey MD, 10 mg at 04/21/21 2469    levETIRAcetam (KEPPRA) tablet 1,000 mg, 1,000 mg, Oral, BID, Ehsan Dailey MD, 1,000 mg at 04/21/21 0807    lamoTRIgine (LaMICtal) tablet 100 mg, 100 mg, Oral, BID, Ehsan Dailey MD, 100 mg at 04/21/21 0807    Saccharomyces boulardii (FLORASTOR) capsule 250 mg, 250 mg, Oral, BID, Ehsan Dailey MD, 250 mg at 04/21/21 0807    0.9% sodium chloride infusion, 75 mL/hr, IntraVENous, CONTINUOUS, Jacob Bueno, DO, Last Rate: 75 mL/hr at 04/19/21 2131, 75 mL/hr at 04/19/21 2131    sodium chloride (NS) flush 5-40 mL, 5-40 mL, IntraVENous, Q8H, Alyne Later, NP, 10 mL at 04/21/21 0640    sodium chloride (NS) flush 5-40 mL, 5-40 mL, IntraVENous, PRN, Alyne Later, NP    acetaminophen (TYLENOL) tablet 650 mg, 650 mg, Oral, Q6H PRN **OR** acetaminophen (TYLENOL) suppository 650 mg, 650 mg, Rectal, Q6H PRN, Alyne Later, NP    polyethylene glycol (MIRALAX) packet 17 g, 17 g, Oral, DAILY PRN, Alyne Later, NP    promethazine (PHENERGAN) tablet 12.5 mg, 12.5 mg, Oral, Q6H PRN **OR** ondansetron (ZOFRAN) injection 4 mg, 4 mg, IntraVENous, Q6H PRN, Alyne Later, NP    LORazepam (ATIVAN) injection 2 mg, 2 mg, IntraVENous, Q4H PRN, Junious Grumbles, NP    LABS:  Recent Results (from the past 24 hour(s))   METABOLIC PANEL, COMPREHENSIVE    Collection Time: 04/20/21  1:35 PM   Result Value Ref Range    Sodium 134 (L) 136 - 145 mmol/L    Potassium 3.0 (L) 3.5 - 5.1 mmol/L    Chloride 102 97 - 108 mmol/L    CO2 22 21 - 32 mmol/L    Anion gap 10 5 - 15 mmol/L    Glucose 87 65 - 100 mg/dL    BUN 11 6 - 20 mg/dL    Creatinine 1.06 (H) 0.55 - 1.02 mg/dL    BUN/Creatinine ratio 10 (L) 12 - 20      GFR est AA >60 >60 ml/min/1.73m2    GFR est non-AA 51 (L) >60 ml/min/1.73m2    Calcium 8.3 (L) 8.5 - 10.1 mg/dL    Bilirubin, total 0.7 0.2 - 1.0 mg/dL    AST (SGOT) 21 15 - 37 U/L    ALT (SGPT) 14 12 - 78 U/L    Alk.  phosphatase 148 (H) 45 - 117 U/L    Protein, total 7.6 6.4 - 8.2 g/dL    Albumin 3.2 (L) 3.5 - 5.0 g/dL    Globulin 4.4 (H) 2.0 - 4.0 g/dL    A-G Ratio 0.7 (L) 1.1 - 2.2     PROTHROMBIN TIME + INR    Collection Time: 04/20/21  1:35 PM   Result Value Ref Range    Prothrombin time 14.2 11.9 - 14.7 sec    INR 1.1 0.9 - 1.1     MRSA SCREEN - PCR (NASAL)    Collection Time: 04/20/21  2:00 PM   Result Value Ref Range    MRSA by PCR, Nasal Not Detected Not Detected     GLUCOSE, POC    Collection Time: 04/20/21  3:46 PM   Result Value Ref Range    Glucose (POC) 102 (H) 65 - 100 mg/dL    Performed by Leyla Chatterjee (PCT)    GLUCOSE, POC    Collection Time: 04/20/21  8:23 PM   Result Value Ref Range    Glucose (POC) 147 (H) 65 - 100 mg/dL    Performed by Pushpa Goldberg    CBC W/O DIFF    Collection Time: 04/20/21  9:04 PM   Result Value Ref Range    WBC 5.3 3.6 - 11.0 K/uL    RBC 5.22 (H) 3.80 - 5.20 M/uL    HGB 15.8 11.5 - 16.0 g/dL    HCT 47.3 (H) 35.0 - 47.0 %    MCV 90.6 80.0 - 99.0 FL    MCH 30.3 26.0 - 34.0 PG    MCHC 33.4 30.0 - 36.5 g/dL    RDW 13.0 11.5 - 14.5 %    PLATELET 283 (L) 407 - 400 K/uL    MPV 11.9 8.9 - 12.9 FL    NRBC 1.1 (H) 0  WBC    ABSOLUTE NRBC 0.06 (H) 0.00 - 0.01 K/uL   DIFFERENTIAL, AUTO Collection Time: 04/20/21  9:04 PM   Result Value Ref Range    NEUTROPHILS 54 32 - 75 %    LYMPHOCYTES 36 12 - 49 %    MONOCYTES 10 5 - 13 %    EOSINOPHILS 0 0 - 7 %    BASOPHILS 0 0 - 1 %    IMMATURE GRANULOCYTES 0 %    ABS. NEUTROPHILS 2.9 1.8 - 8.0 K/UL    ABS. LYMPHOCYTES 1.9 0.8 - 3.5 K/UL    ABS. MONOCYTES 0.5 0.0 - 1.0 K/UL    ABS. EOSINOPHILS 0.0 0.0 - 0.4 K/UL    ABS. BASOPHILS 0.0 0.0 - 0.1 K/UL    ABS. IMM. GRANS. 0.0 K/UL    DF Manual      PLATELET COMMENTS Large Platelets      RBC COMMENTS Normocytic, Normochromic         Visit Vitals  BP (!) 150/82   Pulse 83   Temp 97.9 °F (36.6 °C)   Resp 16   Ht 5' 7\" (1.702 m)   Wt 59 kg (130 lb)   SpO2 97%   BMI 20.36 kg/m²       Imaging:  CT HEAD WO CONT   Final Result   Similar findings compared to CT head October 21, 2020. XR CHEST SNGL V   Final Result   No acute findings.

## 2021-04-21 NOTE — PROGRESS NOTES
Problem: Mobility Impaired (Adult and Pediatric)  Goal: *Acute Goals and Plan of Care (Insert Text)  Description: Pt will be able to participate with LE exercises B LE when given min verbal cues x 7 days  Min A for supine to sit EOB x 7 days  Min A for sit to stand x 7 days  Pt will be able to amb approx 15-25ft with min Ax2 x 7 days and use of RW  Outcome: Not Met       PHYSICAL THERAPY EVALUATION  Patient: Nancy Watts (1 y.o. female)  Date: 4/21/2021  Primary Diagnosis: Seizures ([address] [R56.9]        Precautions: seizure     ASSESSMENT  78yo F admitted to hospital with seizures presents to PT with decreased bed mob, transfers, LE strength, gt, activity tolerance, and overall functional mobility. Pt is a LTC resident at nursing facility. Pt states she is able to amb with walker/cane, however pt is a poor historian and at times confused therefore unclear if that is accurate information. Currently, pt is mod A for bed mob and for supine to sit EOB. Pt mod A for sit to stand transfer. Pt attempted to take fwd/back steps with UE support on RW, however pt unable to advance R LE much for initiation of gt. Pt did better with side to side steps, totaling approx 4ft, however still unsteady. Would recommend 2 person assist for any further gt training. Also, pt appeared to have 2 instances where she demo'd a blank stare and zoned out during session today (only lasting a few seconds each time). She was sitting on EOB first time, and second time was after pt returned to supine in bed. PT notified nursing. Pt may benefit from skilled PT to address her functional deficits. Recommend return to LTC facility post d/c.         PLAN :  Recommendations and Planned Interventions: bed mobility training, transfer training, gait training, therapeutic exercises, patient and family training/education, and therapeutic activities      Frequency/Duration: Patient will be followed by physical therapy:  3 times a week to address goals. Recommendation for discharge: (in order for the patient to meet his/her long term goals)  Return to LTC              SUBJECTIVE:   Patient supine in bed upon PT arrival, agreeable to work with PT    OBJECTIVE DATA SUMMARY:   HISTORY:    No past medical history on file. No past surgical history on file. Personal factors and/or comorbidities impacting plan of care:     Home Situation  Home Environment: [address] [place] [address] Name: [facility]  [place] Residence: One story  Living Alone: No  Support Systems: Skilled nursing facility  Patient Expects to be Discharged to[de-identified] Skilled nursing facility  Current DME Used/Available at Home: rupinder Jones, Sudheer jonas, pau        EXAMINATION/PRESENTATION/DECISION MAKING:   Critical Behavior:  Neurologic State: Alert, Confused  Orientation Level: Oriented to person, Disoriented to place, Disoriented to situation, Disoriented to time  Cognition: Follows commands, Decreased attention/concentration         Range Of Motion:  AROM: Generally decreased, functional  B LE    Strength:    Strength: Generally decreased, functional(Grossly 3 to 3+/5)  Grossly 3+/5 B LE    Tone & Sensation:     Sensation: Intact    Coordination:  Coordination: Generally decreased, functional    Functional Mobility:  Bed Mobility:  Rolling: Moderate assistance  Supine to Sit: Moderate assistance  Sit to Supine: Moderate assistance  Scooting: Moderate assistance  Transfers:  Sit to stand: mod A x1                        Balance:   Sitting: Impaired; With support  Sitting - Static: Fair (occasional)  Sitting - Dynamic: Fair (occasional)  Standing: Impaired;Pull to stand; With support  Standing - Static: Poor;Constant support  Ambulation/Gait Training:  Distance (ft): 4 Feet (ft)(side to side, pt demos inc difficulty adv R LE for fwd/back)  Assistive Device: Walker, rolling  Ambulation - Level of Assistance: Minimal assistance       Speed/Desiree: Shuffled; Slow        Functional Measure:  [facility] AM-PAC 6 Clicks         Basic Mobility Inpatient Short Form  How much difficulty does the patient currently have. .. Unable A Lot A Little None   1. Turning over in bed (including adjusting bedclothes, sheets and blankets)? [] 1   [] 2   [x] 3   [] 4   2. Sitting down on and standing up from a chair with arms ( e.g., wheelchair, bedside commode, etc.)   [] 1   [] 2   [x] 3   [] 4   3. Moving from lying on back to sitting on the side of the bed? [] 1   [] 2   [x] 3   [] 4          How much help from another person does the patient currently need. .. Total A Lot A Little None   4. Moving to and from a bed to a chair (including a wheelchair)? [] 1   [x] 2   [] 3   [] 4   5. Need to walk in hospital room? [] 1   [x] 2   [] 3   [] 4   6. Climbing 3-5 steps with a railing? [] 1   [x] 2   [] 3   [] 4   © 2007, Trustees of [facility], under license to [employer]. All rights reserved     Score:  Initial: 15 Most Recent: X (Date: -- )   Interpretation of Tool:  Represents activities that are increasingly more difficult (i.e. Bed mobility, Transfers, Gait). Score 24 23 22-20 19-15 14-10 9-7 6   Modifier CH CI CJ CK CL CM CN           Physical Therapy Evaluation Charge Determination   History Examination Presentation Decision-Making   MEDIUM  Complexity : 1-2 comorbidities / personal factors will impact the outcome/ POC  MEDIUM Complexity : 3 Standardized tests and measures addressing body structure, function, activity limitation and / or participation in recreation  LOW Complexity : Stable, uncomplicated  Other outcome measures [facility]  MEDIUM      Based on the above components, the patient evaluation is determined to be of the following complexity level: LOW     Pain Rating:  No c/o pain during session today    Activity Tolerance:   Fair  Please refer to the flowsheet for vital signs taken during this treatment.     After treatment patient left in no apparent distress:   Supine in bed, Call bell within reach, and Bed / chair alarm activated        COMMUNICATION/EDUCATION:   The patients plan of care was discussed with: Registered nurse. Patient is unable to participate in goal setting and plan of care.     Thank you for this referral.  Kate Aggarwal   Time Calculation: 20 mins

## 2021-04-21 NOTE — PROGRESS NOTES
Pt VSS. Assessments documented in epic. Pt repositioned throughout the night and resting on hourly rounds. Tele called overnight to report a 3.17 pause with a HR of 18. Pt quickly returned to HR of 78. Dr. Jelani Medina was notified and ordered BNP and Mag for a.m labs. Pt was asymptomatic during this time. Will continue to monitor per plan of care.

## 2021-04-21 NOTE — PROGRESS NOTES
General Daily Progress Note          Patient Name:   Pastora Lopez       YOB: 1950       Age:  69 y.o. Admit Date: 4/19/2021      Subjective:                  Objective:     Visit Vitals  /71   Pulse 78   Temp 97.9 °F (36.6 °C)   Resp 16   Ht 5' 7\" (1.702 m)   Wt 59 kg (130 lb)   SpO2 94%   BMI 20.36 kg/m²        Recent Results (from the past 24 hour(s))   GLUCOSE, POC    Collection Time: 04/20/21  3:46 PM   Result Value Ref Range    Glucose (POC) 102 (H) 65 - 100 mg/dL    Performed by Linus Rubio (PCT)    GLUCOSE, POC    Collection Time: 04/20/21  8:23 PM   Result Value Ref Range    Glucose (POC) 147 (H) 65 - 100 mg/dL    Performed by Evie Nicholas    CBC W/O DIFF    Collection Time: 04/20/21  9:04 PM   Result Value Ref Range    WBC 5.3 3.6 - 11.0 K/uL    RBC 5.22 (H) 3.80 - 5.20 M/uL    HGB 15.8 11.5 - 16.0 g/dL    HCT 47.3 (H) 35.0 - 47.0 %    MCV 90.6 80.0 - 99.0 FL    MCH 30.3 26.0 - 34.0 PG    MCHC 33.4 30.0 - 36.5 g/dL    RDW 13.0 11.5 - 14.5 %    PLATELET 525 (L) 254 - 400 K/uL    MPV 11.9 8.9 - 12.9 FL    NRBC 1.1 (H) 0  WBC    ABSOLUTE NRBC 0.06 (H) 0.00 - 0.01 K/uL   DIFFERENTIAL, AUTO    Collection Time: 04/20/21  9:04 PM   Result Value Ref Range    NEUTROPHILS 54 32 - 75 %    LYMPHOCYTES 36 12 - 49 %    MONOCYTES 10 5 - 13 %    EOSINOPHILS 0 0 - 7 %    BASOPHILS 0 0 - 1 %    IMMATURE GRANULOCYTES 0 %    ABS. NEUTROPHILS 2.9 1.8 - 8.0 K/UL    ABS. LYMPHOCYTES 1.9 0.8 - 3.5 K/UL    ABS. MONOCYTES 0.5 0.0 - 1.0 K/UL    ABS. EOSINOPHILS 0.0 0.0 - 0.4 K/UL    ABS. BASOPHILS 0.0 0.0 - 0.1 K/UL    ABS. IMM.  GRANS. 0.0 K/UL    DF Manual      PLATELET COMMENTS Large Platelets      RBC COMMENTS Normocytic, Normochromic     CBC WITH AUTOMATED DIFF    Collection Time: 04/21/21  8:00 AM   Result Value Ref Range    WBC 4.4 3.6 - 11.0 K/uL    RBC 5.35 (H) 3.80 - 5.20 M/uL    HGB 16.3 (H) 11.5 - 16.0 g/dL    HCT 49.7 (H) 35.0 - 47.0 %    MCV 92.9 80.0 - 99.0 FL    MCH 30.5 26.0 - 34.0 PG    MCHC 32.8 30.0 - 36.5 g/dL    RDW 13.2 11.5 - 14.5 %    PLATELET 435 (L) 334 - 400 K/uL    MPV 11.2 8.9 - 12.9 FL    NRBC 0.0 0  WBC    ABSOLUTE NRBC 0.00 0.00 - 0.01 K/uL    NEUTROPHILS 50 32 - 75 %    LYMPHOCYTES 34 12 - 49 %    MONOCYTES 13 5 - 13 %    EOSINOPHILS 2 0 - 7 %    BASOPHILS 0 0 - 1 %    IMMATURE GRANULOCYTES 1 (H) 0.0 - 0.5 %    ABS. NEUTROPHILS 2.2 1.8 - 8.0 K/UL    ABS. LYMPHOCYTES 1.5 0.8 - 3.5 K/UL    ABS. MONOCYTES 0.6 0.0 - 1.0 K/UL    ABS. EOSINOPHILS 0.1 0.0 - 0.4 K/UL    ABS. BASOPHILS 0.0 0.0 - 0.1 K/UL    ABS. IMM. GRANS. 0.0 0.00 - 0.04 K/UL    DF AUTOMATED     METABOLIC PANEL, COMPREHENSIVE    Collection Time: 04/21/21  8:00 AM   Result Value Ref Range    Sodium 135 (L) 136 - 145 mmol/L    Potassium 3.3 (L) 3.5 - 5.1 mmol/L    Chloride 105 97 - 108 mmol/L    CO2 20 (L) 21 - 32 mmol/L    Anion gap 10 5 - 15 mmol/L    Glucose 79 65 - 100 mg/dL    BUN 11 6 - 20 mg/dL    Creatinine 0.94 0.55 - 1.02 mg/dL    BUN/Creatinine ratio 12 12 - 20      GFR est AA >60 >60 ml/min/1.73m2    GFR est non-AA 59 (L) >60 ml/min/1.73m2    Calcium 8.4 (L) 8.5 - 10.1 mg/dL    Bilirubin, total 0.6 0.2 - 1.0 mg/dL    AST (SGOT) 29 15 - 37 U/L    ALT (SGPT) 17 12 - 78 U/L    Alk. phosphatase 132 (H) 45 - 117 U/L    Protein, total 7.1 6.4 - 8.2 g/dL    Albumin 2.8 (L) 3.5 - 5.0 g/dL    Globulin 4.3 (H) 2.0 - 4.0 g/dL    A-G Ratio 0.7 (L) 1.1 - 2.2     MAGNESIUM    Collection Time: 04/21/21 11:59 AM   Result Value Ref Range    Magnesium 1.9 1.6 - 2.4 mg/dL   BNP    Collection Time: 04/21/21 11:59 AM   Result Value Ref Range    NT pro- (H) <125 pg/mL     [unfilled]      Review of Systems    Constitutional: Negative for chills and fever. HENT: Negative. Eyes: Negative. Respiratory: Negative. Cardiovascular: Negative. Gastrointestinal: Negative for abdominal pain and nausea. Skin: Negative. Neurological: Negative.         Physical Exam:      Constitutional: pt is oriented to person, place, and time. HENT:   Head: Normocephalic and atraumatic. Eyes: Pupils are equal, round, and reactive to light. EOM are normal.   Cardiovascular: Normal rate, regular rhythm and normal heart sounds. Pulmonary/Chest: Breath sounds normal. No wheezes. No rales. Exhibits no tenderness. Abdominal: Soft. Bowel sounds are normal. There is no abdominal tenderness. There is no rebound and no guarding. Musculoskeletal: Normal range of motion. Neurological: pt is alert and oriented to person, place, and time. CT HEAD WO CONT   Final Result   Similar findings compared to CT head October 21, 2020. XR CHEST SNGL V   Final Result   No acute findings. Recent Results (from the past 24 hour(s))   GLUCOSE, POC    Collection Time: 04/20/21  3:46 PM   Result Value Ref Range    Glucose (POC) 102 (H) 65 - 100 mg/dL    Performed by Sally Leo (PCT)    GLUCOSE, POC    Collection Time: 04/20/21  8:23 PM   Result Value Ref Range    Glucose (POC) 147 (H) 65 - 100 mg/dL    Performed by Chary Hahn    CBC W/O DIFF    Collection Time: 04/20/21  9:04 PM   Result Value Ref Range    WBC 5.3 3.6 - 11.0 K/uL    RBC 5.22 (H) 3.80 - 5.20 M/uL    HGB 15.8 11.5 - 16.0 g/dL    HCT 47.3 (H) 35.0 - 47.0 %    MCV 90.6 80.0 - 99.0 FL    MCH 30.3 26.0 - 34.0 PG    MCHC 33.4 30.0 - 36.5 g/dL    RDW 13.0 11.5 - 14.5 %    PLATELET 821 (L) 582 - 400 K/uL    MPV 11.9 8.9 - 12.9 FL    NRBC 1.1 (H) 0  WBC    ABSOLUTE NRBC 0.06 (H) 0.00 - 0.01 K/uL   DIFFERENTIAL, AUTO    Collection Time: 04/20/21  9:04 PM   Result Value Ref Range    NEUTROPHILS 54 32 - 75 %    LYMPHOCYTES 36 12 - 49 %    MONOCYTES 10 5 - 13 %    EOSINOPHILS 0 0 - 7 %    BASOPHILS 0 0 - 1 %    IMMATURE GRANULOCYTES 0 %    ABS. NEUTROPHILS 2.9 1.8 - 8.0 K/UL    ABS. LYMPHOCYTES 1.9 0.8 - 3.5 K/UL    ABS. MONOCYTES 0.5 0.0 - 1.0 K/UL    ABS. EOSINOPHILS 0.0 0.0 - 0.4 K/UL    ABS. BASOPHILS 0.0 0.0 - 0.1 K/UL    ABS. IMM.  GRANS. 0.0 K/UL    DF Manual      PLATELET COMMENTS Large Platelets      RBC COMMENTS Normocytic, Normochromic     CBC WITH AUTOMATED DIFF    Collection Time: 04/21/21  8:00 AM   Result Value Ref Range    WBC 4.4 3.6 - 11.0 K/uL    RBC 5.35 (H) 3.80 - 5.20 M/uL    HGB 16.3 (H) 11.5 - 16.0 g/dL    HCT 49.7 (H) 35.0 - 47.0 %    MCV 92.9 80.0 - 99.0 FL    MCH 30.5 26.0 - 34.0 PG    MCHC 32.8 30.0 - 36.5 g/dL    RDW 13.2 11.5 - 14.5 %    PLATELET 489 (L) 876 - 400 K/uL    MPV 11.2 8.9 - 12.9 FL    NRBC 0.0 0  WBC    ABSOLUTE NRBC 0.00 0.00 - 0.01 K/uL    NEUTROPHILS 50 32 - 75 %    LYMPHOCYTES 34 12 - 49 %    MONOCYTES 13 5 - 13 %    EOSINOPHILS 2 0 - 7 %    BASOPHILS 0 0 - 1 %    IMMATURE GRANULOCYTES 1 (H) 0.0 - 0.5 %    ABS. NEUTROPHILS 2.2 1.8 - 8.0 K/UL    ABS. LYMPHOCYTES 1.5 0.8 - 3.5 K/UL    ABS. MONOCYTES 0.6 0.0 - 1.0 K/UL    ABS. EOSINOPHILS 0.1 0.0 - 0.4 K/UL    ABS. BASOPHILS 0.0 0.0 - 0.1 K/UL    ABS. IMM. GRANS. 0.0 0.00 - 0.04 K/UL    DF AUTOMATED     METABOLIC PANEL, COMPREHENSIVE    Collection Time: 04/21/21  8:00 AM   Result Value Ref Range    Sodium 135 (L) 136 - 145 mmol/L    Potassium 3.3 (L) 3.5 - 5.1 mmol/L    Chloride 105 97 - 108 mmol/L    CO2 20 (L) 21 - 32 mmol/L    Anion gap 10 5 - 15 mmol/L    Glucose 79 65 - 100 mg/dL    BUN 11 6 - 20 mg/dL    Creatinine 0.94 0.55 - 1.02 mg/dL    BUN/Creatinine ratio 12 12 - 20      GFR est AA >60 >60 ml/min/1.73m2    GFR est non-AA 59 (L) >60 ml/min/1.73m2    Calcium 8.4 (L) 8.5 - 10.1 mg/dL    Bilirubin, total 0.6 0.2 - 1.0 mg/dL    AST (SGOT) 29 15 - 37 U/L    ALT (SGPT) 17 12 - 78 U/L    Alk.  phosphatase 132 (H) 45 - 117 U/L    Protein, total 7.1 6.4 - 8.2 g/dL    Albumin 2.8 (L) 3.5 - 5.0 g/dL    Globulin 4.3 (H) 2.0 - 4.0 g/dL    A-G Ratio 0.7 (L) 1.1 - 2.2     MAGNESIUM    Collection Time: 04/21/21 11:59 AM   Result Value Ref Range    Magnesium 1.9 1.6 - 2.4 mg/dL   BNP    Collection Time: 04/21/21 11:59 AM   Result Value Ref Range    NT pro- (H) <125 pg/mL       Results     Procedure Component Value Units Date/Time    MRSA SCREEN - PCR (NASAL) [[id number]] Collected: 04/20/21 1400    Order Status: Completed Specimen: Swab Updated: 04/20/21 1628     MRSA by PCR, Nasal Not Detected       CULTURE, URINE [[id number]]     Order Status: Sent Specimen: Urine from Clean catch     COVID-19 RAPID TEST [[id number]] Collected: 04/19/21 1910    Order Status: Completed Specimen: Nasopharyngeal Updated: 04/19/21 2028     Specimen source Nasopharyngeal        COVID-19 rapid test Not Detected        Comment: Rapid Abbott ID Now   Rapid NAAT:  The specimen is NEGATIVE for SARS-CoV-2, the novel coronavirus associated with COVID-19. Negative results should be treated as presumptive and, if inconsistent with clinical signs and symptoms or necessary for patient management, should be tested with an alternative molecular assay. Negative results do not preclude SARS-CoV-2 infection and should not be used as the sole basis for patient management decisions. This test has been authorized by the FDA under   an Emergency Use Authorization (EUA) for use by authorized laboratories.  Fact sheet for Healthcare Providers: ConventionUpdate.co.nz Fact sheet for Patients: ConventionUpdate.co.nz   Methodology: Isothermal Nucleic Acid Amplification                Labs:     Recent Labs     04/21/21  0800 04/20/21  2104   WBC 4.4 5.3   HGB 16.3* 15.8   HCT 49.7* 47.3*   * 129*     Recent Labs     04/21/21  1159 04/21/21  0800 04/20/21  1335 04/19/21  2045 04/19/21  1350   NA  --  135* 134*  --  138   K  --  3.3* 3.0*  --  3.1*   CL  --  105 102  --  105   CO2  --  20* 22  --  27   BUN  --  11 11  --  11   CREA  --  0.94 1.06*  --  1.34*   GLU  --  79 87  --  88   CA  --  8.4* 8.3*  --  8.1*   MG 1.9  --   --  1.8  --    PHOS  --   --   --  2.6  --      Recent Labs     04/21/21  0800 04/20/21  1335 04/19/21  1350   ALT 17 14 11*   * 148* 131* TBILI 0.6 0.7 0.5   TP 7.1 7.6 6.9   ALB 2.8* 3.2* 3.0*   GLOB 4.3* 4.4* 3.9     Recent Labs     04/20/21  1335   INR 1.1   PTP 14.2      No results for input(s): FE, TIBC, PSAT, FERR in the last 72 hours. No results found for: FOL, RBCF   No results for input(s): PH, PCO2, PO2 in the last 72 hours. No results for input(s): CPK, CKNDX, TROIQ in the last 72 hours.     No lab exists for component: CPKMB  No results found for: CHOL, CHOLX, CHLST, CHOLV, HDL, HDLP, LDL, LDLC, DLDLP, TGLX, TRIGL, TRIGP, CHHD, CHHDX  Lab Results   Component Value Date/Time    Glucose (POC) 147 (H) 04/20/2021 08:23 PM    Glucose (POC) 102 (H) 04/20/2021 03:46 PM    Glucose (POC) 224 (H) 10/28/2020 11:42 AM    Glucose (POC) 106 (H) 10/28/2020 08:23 AM    Glucose (POC) 141 (H) 10/23/2020 07:23 AM     Lab Results   Component Value Date/Time    Color Yellow/Straw 04/19/2021 02:53 PM    Appearance Turbid (A) 04/19/2021 02:53 PM    Specific gravity 1.009 04/19/2021 02:53 PM    Specific gravity 1.012 11/30/2013 04:50 AM    pH (UA) 7.0 04/19/2021 02:53 PM    Protein Negative 04/19/2021 02:53 PM    Glucose Negative 04/19/2021 02:53 PM    Ketone Negative 04/19/2021 02:53 PM    Bilirubin Negative 04/19/2021 02:53 PM    Urobilinogen 0.1 04/19/2021 02:53 PM    Nitrites Negative 04/19/2021 02:53 PM    Leukocyte Esterase Negative 04/19/2021 02:53 PM    Epithelial cells FEW 11/30/2013 04:50 AM    Bacteria 2+ (A) 04/19/2021 02:53 PM    WBC 5-10 04/19/2021 02:53 PM    RBC 5-10 04/19/2021 02:53 PM         Assessment:     Seizures  Hypokalemia  Hypocalcemia  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 stage II  History of craniotomy  Bacteria on urineawaiting culture  History of hypertension  History of hyperlipidemia  History of dementia      Plan:     Replace K   Continue amlodipine Lamictal Keppra Xarelto  Possible discharge to nursing home tomorrow        Current Facility-Administered Medications:     calcium carbonate (TUMS) chewable tablet 400 mg [elemental], 400 mg, Oral, TID PRN, Colton Daniel NP    rivaroxaban [facility] tablet 20 mg, 20 mg, Oral, DAILY WITH DINNER, Colton Daniel NP, 20 mg at 04/20/21 1705    ferrous sulfate 300 mg (60 mg iron)/5 mL oral syrup 300 mg, 300 mg, Oral, BID WITH MEALS, Colton Daniel NP, 300 mg at 04/21/21 0800    amLODIPine (NORVASC) tablet 10 mg, 10 mg, Oral, DAILY, Ehsan Dailey MD, 10 mg at 04/21/21 2859    levETIRAcetam (KEPPRA) tablet 1,000 mg, 1,000 mg, Oral, BID, Ehsan Dailey MD, 1,000 mg at 04/21/21 0807    lamoTRIgine (LaMICtal) tablet 100 mg, 100 mg, Oral, BID, Ehsan Dailey MD, 100 mg at 04/21/21 0807    Saccharomyces boulardii (FLORASTOR) capsule 250 mg, 250 mg, Oral, BID, Ehsan Dailey MD, 250 mg at 04/21/21 0807    0.9% sodium chloride infusion, 75 mL/hr, IntraVENous, CONTINUOUS, Sowmya Mario DO, Last Rate: 75 mL/hr at 04/19/21 2131, 75 mL/hr at 04/19/21 2131    sodium chloride (NS) flush 5-40 mL, 5-40 mL, IntraVENous, Q8H, Colton Daniel NP, 10 mL at 04/21/21 0640    sodium chloride (NS) flush 5-40 mL, 5-40 mL, IntraVENous, PRN, Colton Daniel NP    acetaminophen (TYLENOL) tablet 650 mg, 650 mg, Oral, Q6H PRN **OR** acetaminophen (TYLENOL) suppository 650 mg, 650 mg, Rectal, Q6H PRN, Colton Daniel NP    polyethylene glycol (MIRALAX) packet 17 g, 17 g, Oral, DAILY PRN, Colton Daniel NP    promethazine (PHENERGAN) tablet 12.5 mg, 12.5 mg, Oral, Q6H PRN **OR** ondansetron (ZOFRAN) injection 4 mg, 4 mg, IntraVENous, Q6H PRN, Bristollaine [place], NP    LORazepam (ATIVAN) injection 2 mg, 2 mg, IntraVENous, Q4H PRN, [facility], NP

## 2021-04-21 NOTE — PROGRESS NOTES
Reason for Admission:   Seizures                    RUR Score:     20%             PCP: First and Last name:   Carolina Alegre MD     Name of Practice:    Are you a current patient: Yes/No:    Approximate date of last visit:    Can you participate in a virtual visit if needed:     Do you (patient/family) have any concerns for transition/discharge? Patient is LTC resident at [facility]. Plan for utilizing home health:   Patient resides at SNF    Current Advanced Directive/Advance Care Plan:  Full Code      Healthcare Decision Maker:   Click here to complete [address] including selection of the Healthcare Decision Maker Relationship (ie \"Primary\")              Transition of Care Plan:          CM met with patient at bedside to discuss discharge plan. Patient is a long term care resident of [address] [place] [address] and has been there for over 1 year. Patient agrees to send referral back to [facility].  CM sent referral.

## 2021-04-22 LAB
ALBUMIN SERPL-MCNC: 3.2 G/DL (ref 3.5–5)
ALBUMIN/GLOB SERPL: 0.7 {RATIO} (ref 1.1–2.2)
ALP SERPL-CCNC: 141 U/L (ref 45–117)
ALT SERPL-CCNC: 17 U/L (ref 12–78)
ANION GAP SERPL CALC-SCNC: 8 MMOL/L (ref 5–15)
AST SERPL W P-5'-P-CCNC: 17 U/L (ref 15–37)
BASOPHILS # BLD: 0 K/UL (ref 0–0.1)
BASOPHILS NFR BLD: 0 % (ref 0–1)
BILIRUB SERPL-MCNC: 0.4 MG/DL (ref 0.2–1)
BUN SERPL-MCNC: 11 MG/DL (ref 6–20)
BUN/CREAT SERPL: 11 (ref 12–20)
CA-I BLD-MCNC: 8.7 MG/DL (ref 8.5–10.1)
CHLORIDE SERPL-SCNC: 108 MMOL/L (ref 97–108)
CO2 SERPL-SCNC: 22 MMOL/L (ref 21–32)
CREAT SERPL-MCNC: 0.99 MG/DL (ref 0.55–1.02)
DIFFERENTIAL METHOD BLD: ABNORMAL
EOSINOPHIL # BLD: 0.1 K/UL (ref 0–0.4)
EOSINOPHIL NFR BLD: 2 % (ref 0–7)
ERYTHROCYTE [DISTWIDTH] IN BLOOD BY AUTOMATED COUNT: 13.3 % (ref 11.5–14.5)
GLOBULIN SER CALC-MCNC: 4.5 G/DL (ref 2–4)
GLUCOSE SERPL-MCNC: 96 MG/DL (ref 65–100)
HCT VFR BLD AUTO: 48.9 % (ref 35–47)
HGB BLD-MCNC: 16 G/DL (ref 11.5–16)
IMM GRANULOCYTES # BLD AUTO: 0 K/UL (ref 0–0.04)
IMM GRANULOCYTES NFR BLD AUTO: 0 % (ref 0–0.5)
LAMOTRIGINE SERPL-MCNC: 16.1 UG/ML (ref 2–20)
LEVETIRACETAM SERPL-MCNC: 38.6 UG/ML (ref 10–40)
LYMPHOCYTES # BLD: 1.1 K/UL (ref 0.8–3.5)
LYMPHOCYTES NFR BLD: 25 % (ref 12–49)
MCH RBC QN AUTO: 30.3 PG (ref 26–34)
MCHC RBC AUTO-ENTMCNC: 32.7 G/DL (ref 30–36.5)
MCV RBC AUTO: 92.6 FL (ref 80–99)
MONOCYTES # BLD: 0.6 K/UL (ref 0–1)
MONOCYTES NFR BLD: 14 % (ref 5–13)
NEUTS SEG # BLD: 2.6 K/UL (ref 1.8–8)
NEUTS SEG NFR BLD: 59 % (ref 32–75)
NRBC # BLD: 0 K/UL (ref 0–0.01)
NRBC BLD-RTO: 0 PER 100 WBC
PLATELET # BLD AUTO: 181 K/UL (ref 150–400)
PMV BLD AUTO: 11 FL (ref 8.9–12.9)
POTASSIUM SERPL-SCNC: 3.3 MMOL/L (ref 3.5–5.1)
PROT SERPL-MCNC: 7.7 G/DL (ref 6.4–8.2)
RBC # BLD AUTO: 5.28 M/UL (ref 3.8–5.2)
SODIUM SERPL-SCNC: 138 MMOL/L (ref 136–145)
WBC # BLD AUTO: 4.5 K/UL (ref 3.6–11)

## 2021-04-22 PROCEDURE — 65270000029 HC RM PRIVATE

## 2021-04-22 PROCEDURE — 36415 COLL VENOUS BLD VENIPUNCTURE: CPT

## 2021-04-22 PROCEDURE — 97530 THERAPEUTIC ACTIVITIES: CPT

## 2021-04-22 PROCEDURE — 74011250636 HC RX REV CODE- 250/636: Performed by: NURSE PRACTITIONER

## 2021-04-22 PROCEDURE — 74011250637 HC RX REV CODE- 250/637: Performed by: NURSE PRACTITIONER

## 2021-04-22 PROCEDURE — 74011250637 HC RX REV CODE- 250/637: Performed by: FAMILY MEDICINE

## 2021-04-22 PROCEDURE — 80053 COMPREHEN METABOLIC PANEL: CPT

## 2021-04-22 PROCEDURE — 85025 COMPLETE CBC W/AUTO DIFF WBC: CPT

## 2021-04-22 RX ORDER — AMLODIPINE BESYLATE 10 MG/1
10 TABLET ORAL DAILY
Qty: 30 TAB | Refills: 0 | Status: SHIPPED | OUTPATIENT
Start: 2021-04-23

## 2021-04-22 RX ORDER — LEVETIRACETAM 1000 MG/1
1000 TABLET ORAL 2 TIMES DAILY
Qty: 60 TAB | Refills: 0 | Status: SHIPPED | OUTPATIENT
Start: 2021-04-22 | End: 2021-08-31

## 2021-04-22 RX ADMIN — LAMOTRIGINE 100 MG: 100 TABLET ORAL at 21:29

## 2021-04-22 RX ADMIN — LAMOTRIGINE 100 MG: 100 TABLET ORAL at 09:52

## 2021-04-22 RX ADMIN — AMLODIPINE BESYLATE 10 MG: 5 TABLET ORAL at 09:52

## 2021-04-22 RX ADMIN — Medication 10 ML: at 14:44

## 2021-04-22 RX ADMIN — LEVETIRACETAM 1000 MG: 500 TABLET ORAL at 09:52

## 2021-04-22 RX ADMIN — Medication 250 MG: at 21:29

## 2021-04-22 RX ADMIN — RIVAROXABAN 20 MG: 20 TABLET, FILM COATED ORAL at 17:00

## 2021-04-22 RX ADMIN — LORAZEPAM 2 MG: 2 INJECTION, SOLUTION INTRAMUSCULAR; INTRAVENOUS at 16:48

## 2021-04-22 RX ADMIN — MINERAL SUPPLEMENT IRON 300 MG / 5 ML STRENGTH LIQUID 100 PER BOX UNFLAVORED 300 MG: at 09:52

## 2021-04-22 RX ADMIN — LEVETIRACETAM 1000 MG: 500 TABLET ORAL at 21:29

## 2021-04-22 RX ADMIN — Medication 10 ML: at 06:46

## 2021-04-22 RX ADMIN — MINERAL SUPPLEMENT IRON 300 MG / 5 ML STRENGTH LIQUID 100 PER BOX UNFLAVORED 300 MG: at 18:39

## 2021-04-22 RX ADMIN — Medication 250 MG: at 09:52

## 2021-04-22 NOTE — PROGRESS NOTES
Problem: Mobility Impaired (Adult and Pediatric)  Goal: *Acute Goals and Plan of Care (Insert Text)  Description: Pt will be able to participate with LE exercises B LE when given min verbal cues x 7 days  Min A for supine to sit EOB x 7 days  Min A for sit to stand x 7 days  Pt will be able to amb approx 15-25ft with min Ax2 x 7 days and use of RW  Outcome: Progressing Towards Goal   PHYSICAL THERAPY TREATMENT  Patient: Miya Ramirez (12 y.o. female)  Date: 4/22/2021  Diagnosis: Seizures ([address] [R56.9] <principal problem not specified>       Precautions:    Chart, physical therapy assessment, plan of care and goals were reviewed. ASSESSMENT  Patient continues with skilled PT services and is progressing towards goals. Pt. Supine upon arrival and agreed to work with PT. Pt. Required increased time to complete activities today. Pt. Needed mod assist for supine to sit and scooting to EOB. Pt. Sat EOB approx 8 min with CGA X1/SBA. Pt. Performed a few LE exerises sitting on side of bed. Pt. Had difficulty standing at bedside today. Pt. Unable to clear the bed with buttocks. Pt. Declined to try standing  any further. Pt. Demonstrated weakness in R LE with all mobility and exercises. Pt. May benefit from A X 2 for standing and pre gt. Activities. Current Level of Function Impacting Discharge (mobility/balance): assist level    Other factors to consider for discharge: decrease in strength and mobility         PLAN :  Patient continues to benefit from skilled intervention to address the above impairments. Continue treatment per established plan of care. to address goals.     Recommendation for discharge: (in order for the patient to meet his/her long term goals)  Therapy up to 5 days/week in SNF setting    This discharge recommendation:  Has been made in collaboration with the attending provider and/or case management    IF patient discharges home will need the following DME: SNF       SUBJECTIVE:   Patient stated I want to try to get up. \" Pt. Was able to state name and birthdate. OBJECTIVE DATA SUMMARY:   Critical Behavior:  Neurologic State: Alert, Confused  Orientation Level: Oriented to person, Oriented to time, Disoriented to place, Disoriented to situation  Cognition: Impaired decision making, Memory loss, Follows commands     Functional Mobility Training:  Bed Mobility:  Rolling: Minimum assistance  Supine to Sit: Moderate assistance  Sit to Supine: Moderate assistance  Scooting: Moderate assistance;Maximum assistance     Sat EOB 8 min. With CGA X 1/SBA     Transfers:  Sit to Stand: Moderate assistance;Maximum assistance  Stand to Sit: Moderate assistance;Maximum assistance                             Balance:  Sitting: Impaired; With support  Sitting - Static: Fair (occasional)  Sitting - Dynamic: Fair (occasional)  Standing: Impaired;Pull to stand; With support  Standing - Static: Poor;Constant support  Standing - Dynamic : Poor;Constant support  Ambulation/Gait Training:                   Therapeutic Exercises: Therapeutic Exercises:       EXERCISE   Sets   Reps   Active Active Assist   Passive Self ROM   Comments   Ankle Pumps  12 [x] [] [] [] seated   Quad Sets/Glut Sets  12 [x] [] [] []    Hamstring Sets   [] [] [] []    Short Arc Quads   [] [] [] []    Heel Slides  12 [] [x] [] [] R LE   Straight Leg Raises   [] [] [] []    Hip abd/add  12 [] [x] [] [] R LE   Long Arc Quads   [] [x] [] [] R LE   Marching   [] [] [] []       [] [] [] []       Pain Ratin/10      Activity Tolerance:   Fair and requires frequent rest breaks  Please refer to the flowsheet for vital signs taken during this treatment. After treatment patient left in no apparent distress:   Supine in bed, Call bell within reach, Bed / chair alarm activated, and Side rails x 3    COMMUNICATION/COLLABORATION:   The patients plan of care was discussed with: Registered nurseMary Chu   Time Calculation: 25 mins

## 2021-04-22 NOTE — PROGRESS NOTES
Called Dr. Ana Craig to let him know about pts seizure this evening. He ordered MRI brain wo contrast, TORB. Order placed.

## 2021-04-22 NOTE — PROGRESS NOTES
CM reviewed patients information. Patient has been accepted to return to [facility] when medically stable for discharge. CM inquired about bed availability for return today. Awaiting response.

## 2021-04-22 NOTE — PROGRESS NOTES
NEURO PROGRESS NOTE        SUBJECTIVE:   Seizures  Beclouded dementia      EXAM:  Awake, responds verbally but confabulates confusion  No seizures reported  Moves all 4 extremities      ASSESSMENT/PLAN:  Current disposition for this patient is unknown to me  Continue Keppra and Lamictal    ALLERGIES:    No Known Allergies    MEDS:      Current Facility-Administered Medications:     calcium carbonate (TUMS) chewable tablet 400 mg [elemental], 400 mg, Oral, TID PRN, Suzette Arizmendi NP    rivaroxaban (XARELTO) tablet 20 mg, 20 mg, Oral, DAILY WITH DINNER, Suzette Arizmendi NP, 20 mg at 04/21/21 5237    ferrous sulfate 300 mg (60 mg iron)/5 mL oral syrup 300 mg, 300 mg, Oral, BID WITH MEALS, Suzette Arizmendi NP, 300 mg at 04/21/21 1828    amLODIPine (NORVASC) tablet 10 mg, 10 mg, Oral, DAILY, Ehsan Dailey MD, 10 mg at 04/21/21 4406    levETIRAcetam (KEPPRA) tablet 1,000 mg, 1,000 mg, Oral, BID, Ehsan Dailey MD, 1,000 mg at 04/21/21 2028    lamoTRIgine (LaMICtal) tablet 100 mg, 100 mg, Oral, BID, Ehsan Dailey MD, 100 mg at 04/21/21 2028    Saccharomyces boulardii (FLORASTOR) capsule 250 mg, 250 mg, Oral, BID, Ehsan Dailey MD, 250 mg at 04/21/21 2028    0.9% sodium chloride infusion, 75 mL/hr, IntraVENous, CONTINUOUS, Jayson Mtz DO, Last Rate: 75 mL/hr at 04/21/21 1832, 75 mL/hr at 04/21/21 1832    sodium chloride (NS) flush 5-40 mL, 5-40 mL, IntraVENous, Q8H, Suzette Arizmendi NP, 10 mL at 04/22/21 0646    sodium chloride (NS) flush 5-40 mL, 5-40 mL, IntraVENous, PRN, Suzette Arizmendi NP    acetaminophen (TYLENOL) tablet 650 mg, 650 mg, Oral, Q6H PRN **OR** acetaminophen (TYLENOL) suppository 650 mg, 650 mg, Rectal, Q6H PRN, Suzette Arizmendi NP    polyethylene glycol (MIRALAX) packet 17 g, 17 g, Oral, DAILY PRN, Suzette Arizmendi NP    promethazine (PHENERGAN) tablet 12.5 mg, 12.5 mg, Oral, Q6H PRN **OR** ondansetron (ZOFRAN) injection 4 mg, 4 mg, IntraVENous, Q6H PRN, Radha Hernandez, Donta Cisse, NP    LORazepam (ATIVAN) injection 2 mg, 2 mg, IntraVENous, Q4H PRN, Russell Paez NP    LABS:  Recent Results (from the past 24 hour(s))   MAGNESIUM    Collection Time: 04/21/21 11:59 AM   Result Value Ref Range    Magnesium 1.9 1.6 - 2.4 mg/dL   BNP    Collection Time: 04/21/21 11:59 AM   Result Value Ref Range    NT pro- (H) <125 pg/mL       Visit Vitals  BP (!) 149/89 (BP 1 Location: Left upper arm, BP Patient Position: At rest)   Pulse 77   Temp 97.6 °F (36.4 °C)   Resp 15   Ht 5' 7\" (1.702 m)   Wt 59 kg (130 lb)   SpO2 94%   BMI 20.36 kg/m²       Imaging:  CT HEAD WO CONT   Final Result   Similar findings compared to CT head October 21, 2020. XR CHEST SNGL V   Final Result   No acute findings.

## 2021-04-22 NOTE — PROGRESS NOTES
Witnessed approx a 20 second seizure, ativan was administered, informed attending physician, will continue to monitor closely.

## 2021-04-22 NOTE — PROGRESS NOTES
Pt noticed to have increased amount of twitching to the right eye upon my initial assessment. Pt also would appear to stare off and disconnect from conversation until you said her name again. Dr. Iris Romo was notified and ordered a one time dose of ativan 1mg. After pt received ativan she has been resting comfortably and there is less twitching to the right eye. Pt is A&O x2-3 at times and stated that her eye always twitching. Will continue to monitor. VSS, bed alarm on, hourly rounding completed.

## 2021-04-22 NOTE — PROGRESS NOTES
Two RN skin check completed with Dorian Meier. Pt has bilat scattered bruising to both arms but no other skin issues at this time. Pt self turns.

## 2021-04-23 VITALS
HEART RATE: 65 BPM | WEIGHT: 130 LBS | HEIGHT: 67 IN | RESPIRATION RATE: 18 BRPM | BODY MASS INDEX: 20.4 KG/M2 | DIASTOLIC BLOOD PRESSURE: 75 MMHG | TEMPERATURE: 97.2 F | SYSTOLIC BLOOD PRESSURE: 133 MMHG | OXYGEN SATURATION: 98 %

## 2021-04-23 PROCEDURE — 74011250637 HC RX REV CODE- 250/637: Performed by: FAMILY MEDICINE

## 2021-04-23 PROCEDURE — 74011250637 HC RX REV CODE- 250/637: Performed by: NURSE PRACTITIONER

## 2021-04-23 RX ORDER — POTASSIUM CHLORIDE 750 MG/1
40 TABLET, FILM COATED, EXTENDED RELEASE ORAL DAILY
Status: DISCONTINUED | OUTPATIENT
Start: 2021-04-23 | End: 2021-04-24 | Stop reason: HOSPADM

## 2021-04-23 RX ADMIN — MINERAL SUPPLEMENT IRON 300 MG / 5 ML STRENGTH LIQUID 100 PER BOX UNFLAVORED 300 MG: at 09:22

## 2021-04-23 RX ADMIN — Medication 250 MG: at 09:22

## 2021-04-23 RX ADMIN — LEVETIRACETAM 1000 MG: 500 TABLET ORAL at 09:22

## 2021-04-23 RX ADMIN — AMLODIPINE BESYLATE 10 MG: 5 TABLET ORAL at 09:22

## 2021-04-23 RX ADMIN — RIVAROXABAN 20 MG: 20 TABLET, FILM COATED ORAL at 17:17

## 2021-04-23 RX ADMIN — LAMOTRIGINE 100 MG: 100 TABLET ORAL at 09:22

## 2021-04-23 RX ADMIN — MINERAL SUPPLEMENT IRON 300 MG / 5 ML STRENGTH LIQUID 100 PER BOX UNFLAVORED 300 MG: at 17:17

## 2021-04-23 NOTE — DISCHARGE SUMMARY
Discharge Summary       PATIENT ID: Acacia Villatoro  MRN: [id number]   YOB: 1950    DATE OF ADMISSION: 4/19/2021 11:44 AM    DATE OF DISCHARGE:   PRIMARY CARE PROVIDER: Xu Doss MD     ATTENDING PHYSICIAN: Hilda Meehan  DISCHARGING PROVIDER: Kim Hough      CONSULTATIONS: IP CONSULT TO NEUROLOGY    PROCEDURES/SURGERIES: * No surgery found *    ADMITTING DIAGNOSES:    Patient Active Problem List    Diagnosis Date Noted    Seizures ([address] 04/19/2021    Hypernatremia 10/17/2020    UTI (urinary tract infection) 10/17/2020       DISCHARGE DIAGNOSES / PLAN:         Active Hospital Problems    Diagnosis Date Noted    Seizures ([facility] [address] 04/19/2021 1. ADDITIONAL CARE RECOMMENDATIONS:         DISCHARGE MEDICATIONS:  Current Discharge Medication List      START taking these medications    Details   amLODIPine (NORVASC) 10 mg tablet Take 1 Tab by mouth daily. Qty: 30 Tab, Refills: 0         CONTINUE these medications which have CHANGED    Details   levETIRAcetam 1,000 mg tablet Take 1 Tab by mouth two (2) times a day. Qty: 60 Tab, Refills: 0      rivaroxaban (Xarelto) 20 mg tab tablet Take 1 Tab by mouth daily (with dinner). Indications: treatment to prevent recurrence of a clot in a deep vein  Qty: 30 Tab, Refills: 0         CONTINUE these medications which have NOT CHANGED    Details   desmopressin (DDAVP) 0.2 mg tablet Take 0.2 mg by mouth three (3) times daily. docusate sodium (Colace) 100 mg capsule Take 100 mg by mouth two (2) times a day. calcium carbonate (OS-GIN) 500 mg calcium (1,250 mg) tablet Take 2 Tabs by mouth every six (6) hours as needed for Indigestion. ferrous sulfate 300 mg (60 mg iron)/5 mL syrup Take  by mouth three (3) times daily. lamoTRIgine (LaMICtal) 100 mg tablet Take 100 mg by mouth two (2) times a day.  Indications: convulsive seizures         STOP taking these medications       hydrocortisone (Anusol-HC) 2.5 % rectal cream Comments: Reason for Stopping:         senna (Senna) 8.6 mg tablet Comments:   Reason for Stopping:         Saccharomyces boulardii (Florastor) 250 mg capsule Comments:   Reason for Stopping:                 NOTIFY YOUR PHYSICIAN FOR ANY OF THE FOLLOWING:   Fever over 101 degrees for 24 hours. Chest pain, shortness of breath, fever, chills, nausea, vomiting, diarrhea, change in mentation, falling, weakness, bleeding. Severe pain or pain not relieved by medications. Or, any other signs or symptoms that you may have questions about. DISPOSITION:  x  Home With:   OT  PT  HH  RN      X Long term SNF/Inpatient Rehab    Independent/assisted living    Hospice    Other:       PATIENT CONDITION AT DISCHARGE: Stable      PHYSICAL EXAMINATION AT DISCHARGE:  General:          Alert, cooperative, no distress, appears older than stated age. HEENT:           Atraumatic, anicteric sclerae, pink conjunctivae                          No oral ulcers, mucosa moist, throat clear, dentition fair  Neck:               Supple, symmetrical  Lungs:             Clear to auscultation bilaterally. No Wheezing or Rhonchi. No rales. Chest wall:      No tenderness  No Accessory muscle use. Heart:              Regular  rhythm,  No  murmur   No edema  Abdomen:        Soft, non-tender. Not distended. Bowel sounds normal  Extremities:     No cyanosis. No clubbing,                            Skin turgor normal, Capillary refill normal  Skin:                Not pale. Not Jaundiced  No rashes   Psych:             Not anxious or agitated. Neurologic:      Alert, moves all extremities, orientation at baseline. No seizure activity noted. CT HEAD WO CONT   Final Result   Similar findings compared to CT head October 21, 2020. XR CHEST SNGL V   Final Result   No acute findings. No results found for this or any previous visit (from the past 24 hour(s)).        HOSPITAL COURSE:    69-year-old female past medical history significant for seizures, craniotomy post aneurysm, CVA, dementia, CKD, and hypertension presented to the emergency department for increased seizure activity from her skilled nursing facility. Per report she had had multiple seizures over the last 24 hours. She takes lamotrigine and Keppra which is administered by the skilled nursing facility staff. She is a poor historian due to her dementia and is not cooperative with exam questions. Upon admission to the ED she received a bolus of Keppra, lab abnormalities consisted of hypokalemia on chemistry which was corrected. CT of her head was negative for any acute process with results consistent with prior CTs. Renal function was slightly worse than her previous values but was consistent with her CKD. Neurology was consulted on the patient throughout admission. She was continued on her Keppra and Lamictal.  She was unable to receive MRI due to aneurysm clips. She had 2 seizures throughout her admission but was cleared by neurology for discharge this morning. Her Keppra was increased and Lamictal was kept the same dose. She will follow up with her neurologist as an outpatient. Physical therapy and occupational therapy worked with patient throughout admission. At the time of discharge all labs, imaging, and cultures were reviewed. Her hypokalemia of 3.3 was corrected orally. Magnesium level was normal.  Renal function had returned to normalcy. Keppra and Lamictal levels were therapeutic. Urinalysis on admission had 2+ bacteria but culture was unremarkable. Likely urinalysis was not clean-catch. He remained afebrile without leukocytosis or evidence of active infection throughout admission. Case management assisted with disposition and discharge planning. The patient was deemed appropriate for discharge by neurology and Dr. Gabby Sorenson. She will return to her skilled nursing facility today.     35 minutes were spent on discharging patient with over 50% on collaboration and coordination of care.     Signed:   Sheryl Dowell NP  4/23/2021  10:37 AM

## 2021-04-23 NOTE — PROGRESS NOTES
NEURO PROGRESS NOTE        SUBJECTIVE:   Seizures  Beclouded dementia      EXAM:  Awake, slightly lethargic but responds verbally.   Does not confabulate that much this morning  No seizures reported  Follows one-step commands      ASSESSMENT/PLAN:  Patient being [facility]    ALLERGIES:    No Known Allergies    MEDS:      Current Facility-Administered Medications:     potassium chloride SR (KLOR-CON 10) tablet 40 mEq, 40 mEq, Oral, DAILY, Rose Mary Kyle NP    calcium carbonate (TUMS) chewable tablet 400 mg [elemental], 400 mg, Oral, TID PRN, Rose Mary Kyle NP    rivaroxaban (XARELTO) tablet 20 mg, 20 mg, Oral, DAILY WITH DINNER, Rose Mary Kyle NP, 20 mg at 04/22/21 1700    ferrous sulfate 300 mg (60 mg iron)/5 mL oral syrup 300 mg, 300 mg, Oral, BID WITH MEALS, Rose Mary Kyle NP, 300 mg at 04/23/21 5928    amLODIPine (NORVASC) tablet 10 mg, 10 mg, Oral, DAILY, Ehsan Dailey MD, 10 mg at 04/23/21 3130    levETIRAcetam (KEPPRA) tablet 1,000 mg, 1,000 mg, Oral, BID, Killian Dailey MD, 1,000 mg at 04/23/21 0427    lamoTRIgine (LaMICtal) tablet 100 mg, 100 mg, Oral, BID, Killian Dailey MD, 100 mg at 04/23/21 8949    Saccharomyces boulardii (FLORASTOR) capsule 250 mg, 250 mg, Oral, BID, Ehsan Dailey MD, 250 mg at 04/23/21 0803    sodium chloride (NS) flush 5-40 mL, 5-40 mL, IntraVENous, PRN, Rose Mary Kyle NP    acetaminophen (TYLENOL) tablet 650 mg, 650 mg, Oral, Q6H PRN **OR** acetaminophen (TYLENOL) suppository 650 mg, 650 mg, Rectal, Q6H PRN, Rose Mary Kyle NP    polyethylene glycol (MIRALAX) packet 17 g, 17 g, Oral, DAILY PRN, Rose Mary Kyle NP    promethazine (PHENERGAN) tablet 12.5 mg, 12.5 mg, Oral, Q6H PRN **OR** ondansetron (ZOFRAN) injection 4 mg, 4 mg, IntraVENous, Q6H PRN, Clarine Saroj, NP    LORazepam (ATIVAN) injection 2 mg, 2 mg, IntraVENous, Q4H PRN, Clarine Saroj, NP, 2 mg at 04/22/21 1644    LABS:  No results found for this or any previous visit (from the past 24 hour(s)). Visit Vitals  BP (!) 147/82   Pulse 74   Temp 97.8 °F (36.6 °C)   Resp 18   Ht 5' 7\" (1.702 m)   Wt 59 kg (130 lb)   SpO2 97%   BMI 20.36 kg/m²       Imaging:  CT HEAD WO CONT   Final Result   Similar findings compared to CT head October 21, 2020. XR CHEST SNGL V   Final Result   No acute findings.

## 2021-04-23 NOTE — PROGRESS NOTES
CM called [facility] [phone]. Patient can return to their facility today. Patient will be going into room 224A, Nurse to call report to [phone], Address: [address] [place] [address], [address] [place] [address]. Discharge plan of care/case management plan validated with provider discharge order.

## 2021-04-23 NOTE — DISCHARGE INSTRUCTIONS
Patient Education        Seizure: Care Instructions  Your Care Instructions     Seizures are caused by abnormal patterns of electrical signals in the brain. They are different for each person. Seizures can affect movement, speech, vision, or awareness. Some people have only slight shaking of a hand and do not pass out. Other people may pass out and have violent shaking of the whole body. Some people appear to stare into space. They are awake, but they can't respond normally. Later, they may not remember what happened. You may need tests to identify the type and cause of the seizures. A seizure may occur only once, or you may have them more than one time. Taking medicines as directed and following up with your doctor may help keep you from having more seizures. The doctor has checked you carefully, but problems can develop later. If you notice any problems or new symptoms, get medical treatment right away. Follow-up care is a key part of your treatment and safety. Be sure to make and go to all appointments, and call your doctor if you are having problems. It's also a good idea to know your test results and keep a list of the medicines you take. How can you care for yourself at home? · Be safe with medicines. Take your medicines exactly as prescribed. Call your doctor if you think you are having a problem with your medicine. · Do not do any activity that could be dangerous to you or others until your doctor says it is safe to do so. For example, do not drive a car, operate machinery, swim, or climb ladders. · Be sure that anyone treating you for any health problem knows that you have had a seizure and what medicines you are taking for it. · Identify and avoid things that may make you more likely to have a seizure. These may include lack of sleep, alcohol or drug use, stress, or not eating. · Make sure you go to your follow-up appointment. When should you call for help?    Call 911 anytime you think you may need emergency care. For example, call if:    · You have another seizure.     · You have new symptoms, such as trouble walking, speaking, or thinking clearly. Call your doctor now or seek immediate medical care if:    · You are not acting normally. Watch closely for changes in your health, and be sure to contact your doctor if you have any problems. Where can you learn more? Go to http://www.gray.com/  Enter M769 in the search box to learn more about \"Seizure: Care Instructions. \"  Current as of: August 4, 2020               Content Version: 12.8  © 3660-0972 [employer]. Care instructions adapted under license by [employer] (which disclaims liability or warranty for this information). If you have questions about a medical condition or this instruction, always ask your healthcare professional. [address] any warranty or liability for your use of this information.

## 2021-04-23 NOTE — PROGRESS NOTES
CM spoke with Dr. Kvng Morrow. Patient is cleared for discharge from Neurology standpoint. CM inquired about an available bed at [facility].

## 2021-04-23 NOTE — PROGRESS NOTES
Pt still awaiting transport to [facility], report was called earlier to Renata Blair.  Report given to oncoming nurse Sai Moore NP.

## 2021-04-23 NOTE — PROGRESS NOTES
Completed MRI checklist per  Jermain Newberry, was told she has two aneurysm clips in head that were placed in 1993. I called Parish Dueñaswu in MRI and he strongly recommended that she not get the MRI as these clips can be dangerous.  Informed Dr. Rashmi Rivas, he agreed to cancel test.

## 2021-04-23 NOTE — PROGRESS NOTES
Spoke with pt and called her  Pily Santillan to verify that he was aware of the dc to [facility] today, he agreed with the dc plan. Discharge plan of care/case management plan validated with provider discharge order.

## 2021-04-24 NOTE — PROCEDURES
[facility]  EEG    Name:  Nithya Childress  MR#:  [id number]  :  1950  ACCOUNT #:  [de-identified]  DATE OF SERVICE:  2021    DIAGNOSES:  Seizure, dementia. DESCRIPTION:  Recording is done digitally on the computer. Electrodes are placed in a 10-20 international system. Initial recording is equipment calibration, followed by biocalibration. Initial montages are bipolar montages. Tracing started with the patient drowsy. As the recording continues, the patient is hooked up on an EKG machine that shows a heart rate of 102. There is occasional muscle artifact. The patient is exposed to photic stimulation without any abnormality. Hyperventilation is not done. IMPRESSION:  This is an EEG showing the patient drowsy. There is no seizure activity.         MD EMMA [email or web address]  D:  2021 11:35  T:  2021 23:03  JOB #:  [id number]

## 2021-08-05 ENCOUNTER — APPOINTMENT (OUTPATIENT)
Dept: ULTRASOUND IMAGING | Age: 71
DRG: 644 | End: 2021-08-05
Attending: FAMILY MEDICINE
Payer: MEDICARE

## 2021-08-05 ENCOUNTER — HOSPITAL ENCOUNTER (INPATIENT)
Age: 71
LOS: 4 days | Discharge: NURSING, REHAB OR LONG-TERM CARE | DRG: 644 | End: 2021-08-09
Attending: FAMILY MEDICINE | Admitting: FAMILY MEDICINE
Payer: MEDICARE

## 2021-08-05 DIAGNOSIS — E87.0 HYPERNATREMIA: Primary | ICD-10-CM

## 2021-08-05 DIAGNOSIS — R56.9 SEIZURE (HCC): ICD-10-CM

## 2021-08-05 LAB
ALBUMIN SERPL-MCNC: 3.1 G/DL (ref 3.5–5)
ALBUMIN/GLOB SERPL: 0.7 {RATIO} (ref 1.1–2.2)
ALP SERPL-CCNC: 153 U/L (ref 45–117)
ALT SERPL-CCNC: 16 U/L (ref 12–78)
ANION GAP SERPL CALC-SCNC: 4 MMOL/L (ref 5–15)
APPEARANCE UR: CLEAR
AST SERPL W P-5'-P-CCNC: ABNORMAL U/L (ref 15–37)
BACTERIA URNS QL MICRO: NEGATIVE /HPF
BILIRUB SERPL-MCNC: 0.4 MG/DL (ref 0.2–1)
BILIRUB UR QL: NEGATIVE
BUN SERPL-MCNC: 16 MG/DL (ref 6–20)
BUN/CREAT SERPL: 11 (ref 12–20)
CA-I BLD-MCNC: 9.1 MG/DL (ref 8.5–10.1)
CHLORIDE SERPL-SCNC: 120 MMOL/L (ref 97–108)
CO2 SERPL-SCNC: 28 MMOL/L (ref 21–32)
COLOR UR: ABNORMAL
CREAT SERPL-MCNC: 1.42 MG/DL (ref 0.55–1.02)
GLOBULIN SER CALC-MCNC: 4.5 G/DL (ref 2–4)
GLUCOSE SERPL-MCNC: 81 MG/DL (ref 65–100)
GLUCOSE UR STRIP.AUTO-MCNC: NEGATIVE MG/DL
HGB UR QL STRIP: ABNORMAL
KETONES UR QL STRIP.AUTO: NEGATIVE MG/DL
LEUKOCYTE ESTERASE UR QL STRIP.AUTO: NEGATIVE
NITRITE UR QL STRIP.AUTO: NEGATIVE
PH UR STRIP: 7 [PH] (ref 5–8)
POTASSIUM SERPL-SCNC: ABNORMAL MMOL/L (ref 3.5–5.1)
PROT SERPL-MCNC: 7.6 G/DL (ref 6.4–8.2)
PROT UR STRIP-MCNC: NEGATIVE MG/DL
RBC #/AREA URNS HPF: ABNORMAL /HPF (ref 0–5)
SODIUM SERPL-SCNC: 152 MMOL/L (ref 136–145)
SP GR UR REFRACTOMETRY: 1.01 (ref 1–1.03)
UA: UC IF INDICATED,UAUC: ABNORMAL
UROBILINOGEN UR QL STRIP.AUTO: 0.1 EU/DL (ref 0.1–1)
WBC URNS QL MICRO: ABNORMAL /HPF (ref 0–4)

## 2021-08-05 PROCEDURE — 74011250636 HC RX REV CODE- 250/636: Performed by: FAMILY MEDICINE

## 2021-08-05 PROCEDURE — 99284 EMERGENCY DEPT VISIT MOD MDM: CPT

## 2021-08-05 PROCEDURE — 97161 PT EVAL LOW COMPLEX 20 MIN: CPT

## 2021-08-05 PROCEDURE — 36415 COLL VENOUS BLD VENIPUNCTURE: CPT

## 2021-08-05 PROCEDURE — 97530 THERAPEUTIC ACTIVITIES: CPT

## 2021-08-05 PROCEDURE — 76770 US EXAM ABDO BACK WALL COMP: CPT

## 2021-08-05 PROCEDURE — 74011250637 HC RX REV CODE- 250/637: Performed by: FAMILY MEDICINE

## 2021-08-05 PROCEDURE — 80053 COMPREHEN METABOLIC PANEL: CPT

## 2021-08-05 PROCEDURE — 65660000001 HC RM ICU INTERMED STEPDOWN

## 2021-08-05 PROCEDURE — 81001 URINALYSIS AUTO W/SCOPE: CPT

## 2021-08-05 RX ORDER — FERROUS SULFATE 300 MG/5ML
300 LIQUID (ML) ORAL 3 TIMES DAILY
Status: DISCONTINUED | OUTPATIENT
Start: 2021-08-05 | End: 2021-08-05

## 2021-08-05 RX ORDER — CALCIUM CARBONATE 200(500)MG
1000 TABLET,CHEWABLE ORAL
Status: DISCONTINUED | OUTPATIENT
Start: 2021-08-05 | End: 2021-08-09 | Stop reason: HOSPADM

## 2021-08-05 RX ORDER — LEVETIRACETAM 500 MG/1
1000 TABLET ORAL 2 TIMES DAILY
Status: DISCONTINUED | OUTPATIENT
Start: 2021-08-05 | End: 2021-08-08

## 2021-08-05 RX ORDER — ACETAMINOPHEN 325 MG/1
650 TABLET ORAL
Status: DISCONTINUED | OUTPATIENT
Start: 2021-08-05 | End: 2021-08-09 | Stop reason: HOSPADM

## 2021-08-05 RX ORDER — DESMOPRESSIN ACETATE 0.2 MG/1
0.2 TABLET ORAL 3 TIMES DAILY
Status: DISCONTINUED | OUTPATIENT
Start: 2021-08-05 | End: 2021-08-09 | Stop reason: HOSPADM

## 2021-08-05 RX ORDER — DEXTROSE MONOHYDRATE 50 MG/ML
75 INJECTION, SOLUTION INTRAVENOUS CONTINUOUS
Status: DISCONTINUED | OUTPATIENT
Start: 2021-08-05 | End: 2021-08-08

## 2021-08-05 RX ORDER — SODIUM CHLORIDE 450 MG/100ML
50 INJECTION, SOLUTION INTRAVENOUS CONTINUOUS
Status: DISCONTINUED | OUTPATIENT
Start: 2021-08-05 | End: 2021-08-05

## 2021-08-05 RX ORDER — ONDANSETRON 4 MG/1
4 TABLET, ORALLY DISINTEGRATING ORAL
Status: DISCONTINUED | OUTPATIENT
Start: 2021-08-05 | End: 2021-08-09 | Stop reason: HOSPADM

## 2021-08-05 RX ORDER — POLYETHYLENE GLYCOL 3350 17 G/17G
17 POWDER, FOR SOLUTION ORAL DAILY PRN
Status: DISCONTINUED | OUTPATIENT
Start: 2021-08-05 | End: 2021-08-09 | Stop reason: HOSPADM

## 2021-08-05 RX ORDER — ONDANSETRON 2 MG/ML
4 INJECTION INTRAMUSCULAR; INTRAVENOUS
Status: DISCONTINUED | OUTPATIENT
Start: 2021-08-05 | End: 2021-08-09 | Stop reason: HOSPADM

## 2021-08-05 RX ORDER — AMLODIPINE BESYLATE 5 MG/1
10 TABLET ORAL DAILY
Status: DISCONTINUED | OUTPATIENT
Start: 2021-08-05 | End: 2021-08-09 | Stop reason: HOSPADM

## 2021-08-05 RX ORDER — LAMOTRIGINE 100 MG/1
100 TABLET ORAL 2 TIMES DAILY
Status: DISCONTINUED | OUTPATIENT
Start: 2021-08-05 | End: 2021-08-09 | Stop reason: HOSPADM

## 2021-08-05 RX ORDER — FERROUS SULFATE 300 MG/5ML
300 LIQUID (ML) ORAL 2 TIMES DAILY WITH MEALS
Status: DISCONTINUED | OUTPATIENT
Start: 2021-08-05 | End: 2021-08-09 | Stop reason: HOSPADM

## 2021-08-05 RX ORDER — ACETAMINOPHEN 650 MG/1
650 SUPPOSITORY RECTAL
Status: DISCONTINUED | OUTPATIENT
Start: 2021-08-05 | End: 2021-08-09 | Stop reason: HOSPADM

## 2021-08-05 RX ADMIN — RIVAROXABAN 20 MG: 20 TABLET, FILM COATED ORAL at 18:02

## 2021-08-05 RX ADMIN — LAMOTRIGINE 100 MG: 100 TABLET ORAL at 09:50

## 2021-08-05 RX ADMIN — LEVETIRACETAM 1000 MG: 500 TABLET ORAL at 09:50

## 2021-08-05 RX ADMIN — LAMOTRIGINE 100 MG: 100 TABLET ORAL at 23:30

## 2021-08-05 RX ADMIN — LEVETIRACETAM 1000 MG: 500 TABLET ORAL at 23:31

## 2021-08-05 RX ADMIN — DEXTROSE MONOHYDRATE 75 ML/HR: 50 INJECTION, SOLUTION INTRAVENOUS at 11:17

## 2021-08-05 RX ADMIN — ACETAMINOPHEN 650 MG: 325 TABLET ORAL at 23:30

## 2021-08-05 RX ADMIN — ACETAMINOPHEN 650 MG: 325 TABLET ORAL at 23:29

## 2021-08-05 RX ADMIN — MINERAL SUPPLEMENT IRON 300 MG / 5 ML STRENGTH LIQUID 100 PER BOX UNFLAVORED 300 MG: at 18:03

## 2021-08-05 RX ADMIN — MINERAL SUPPLEMENT IRON 300 MG / 5 ML STRENGTH LIQUID 100 PER BOX UNFLAVORED 300 MG: at 09:48

## 2021-08-05 RX ADMIN — AMLODIPINE BESYLATE 10 MG: 5 TABLET ORAL at 09:49

## 2021-08-05 RX ADMIN — DESMOPRESSIN ACETATE 200 MCG: 0.2 TABLET ORAL at 18:03

## 2021-08-05 RX ADMIN — DESMOPRESSIN ACETATE 200 MCG: 0.2 TABLET ORAL at 09:49

## 2021-08-05 RX ADMIN — DESMOPRESSIN ACETATE 200 MCG: 0.2 TABLET ORAL at 23:30

## 2021-08-05 NOTE — PROGRESS NOTES
Patient arrived to unit via stretcher and ED Tech; dual skin assessment completed with Deepti Pisano RN noted very dry skin, no open areas noted very hard callous to bilateral heels and; peg tube noted.

## 2021-08-05 NOTE — PROGRESS NOTES
During skin assessment patient was noted having three separate occurrences of seizure activity last 15 seconds; RN Triston Cotter at bedside during this activity. Dr. Michael Smith called and notified that patient's bladder was holding 782 cc of urine in bladder; upon arrive to room with supplies patient had voided 500cc clear yellow urine via pure wick; bladder remains slightly distended; While atrying to place Novoa cath per Dr. Solo Corral verbal order patient became very upset and refused; pure wick replaced.

## 2021-08-05 NOTE — ED PROVIDER NOTES
EMERGENCY DEPARTMENT HISTORY AND PHYSICAL EXAM      Date: 8/5/2021  Patient Name: Alejandro Randolph    History of Presenting Illness     Chief Complaint   Patient presents with    Abnormal Lab Results    Other       History Provided By: Nursing Home/SNF/Rehab Center    HPI: Alejandro Randolph, 66 y.o. female with a past medical history significant seizure and Hyponatremia presents to the ED with cc of Seizure. Patient has a history of seizures. Patient was alert when EMS EMS arrived. Patient has a history of hypernatremia. Patient also informed EMS of abnormal labs. Moderate severity, no known exacerbating or relieving factors, no other associated signs and symptoms    There are no other complaints, changes, or physical findings at this time. PCP: Fabiano Crouch MD    No current facility-administered medications on file prior to encounter. Current Outpatient Medications on File Prior to Encounter   Medication Sig Dispense Refill    levETIRAcetam 1,000 mg tablet Take 1 Tab by mouth two (2) times a day. 60 Tab 0    rivaroxaban (Xarelto) 20 mg tab tablet Take 1 Tab by mouth daily (with dinner). Indications: treatment to prevent recurrence of a clot in a deep vein 30 Tab 0    amLODIPine (NORVASC) 10 mg tablet Take 1 Tab by mouth daily. 30 Tab 0    desmopressin (DDAVP) 0.2 mg tablet Take 0.2 mg by mouth three (3) times daily.  calcium carbonate (OS-GIN) 500 mg calcium (1,250 mg) tablet Take 2 Tabs by mouth every six (6) hours as needed for Indigestion.  ferrous sulfate 300 mg (60 mg iron)/5 mL syrup Take  by mouth three (3) times daily.  lamoTRIgine (LaMICtal) 100 mg tablet Take 100 mg by mouth two (2) times a day.  Indications: convulsive seizures         Past History     Past Medical History:  Past Medical History:   Diagnosis Date    Anemia     Cerebral aneurysm     with repair    Chronic kidney disease     [facility] [address]     GI bleed     Hyperlipidemia     Hypertension     Partial diabetes insipidus ([facility] [address]     Rectal bleeding     Seizure ([facility] [address]     Stroke [facility])        Past Surgical History:  Past Surgical History:   Procedure Laterality Date    HX GI  07/08/2021    PEG replacement    HX OTHER SURGICAL      PEG TUBE       Family History:  History reviewed. No pertinent family history. Social History:  Social History     Tobacco Use    Smoking status: Never Smoker    Smokeless tobacco: Never Used   Substance Use Topics    Alcohol use: Not on file    Drug use: Not on file       Allergies:  No Known Allergies      Review of Systems     Review of Systems   Constitutional: Negative for chills, fatigue and fever. HENT: Negative for congestion, sinus pressure and trouble swallowing. Eyes: Negative for photophobia and pain. Respiratory: Negative for cough and shortness of breath. Cardiovascular: Negative for chest pain and leg swelling. Gastrointestinal: Negative for abdominal pain, diarrhea, nausea and vomiting. Endocrine: Negative for polydipsia, polyphagia and polyuria. Genitourinary: Negative for decreased urine volume, difficulty urinating, dysuria, hematuria and urgency. Musculoskeletal: Negative for back pain, gait problem, myalgias and neck pain. Skin: Negative for pallor and rash. Allergic/Immunologic: Negative for environmental allergies and food allergies. Neurological: Positive for seizures. Negative for dizziness, facial asymmetry, speech difficulty, numbness and headaches. Hematological: Negative for adenopathy. Does not bruise/bleed easily. Psychiatric/Behavioral: Negative for agitation, self-injury and suicidal ideas. The patient is not nervous/anxious. Physical Exam     Physical Exam  Vitals and nursing note reviewed. Constitutional:       Appearance: Normal appearance. HENT:      Head: Atraumatic.       Right Ear: Tympanic membrane and external ear normal.      Left Ear: Tympanic membrane and external ear normal.      Nose: Nose normal. Mouth/Throat:      Mouth: Mucous membranes are moist.   Eyes:      Extraocular Movements: Extraocular movements intact. Pupils: Pupils are equal, round, and reactive to light. Cardiovascular:      Rate and Rhythm: Normal rate and regular rhythm. Pulses: Normal pulses. Heart sounds: Normal heart sounds. Pulmonary:      Breath sounds: Normal breath sounds. Abdominal:      General: Abdomen is flat. Palpations: Abdomen is soft. Musculoskeletal:         General: Normal range of motion. Cervical back: Normal range of motion and neck supple. Skin:     General: Skin is warm and dry. Capillary Refill: Capillary refill takes less than 2 seconds. Neurological:      Mental Status: She is alert. Mental status is at baseline. She is disoriented. Psychiatric:         Mood and Affect: Mood normal.         Behavior: Behavior normal.         Lab and Diagnostic Study Results     Labs -   No results found for this or any previous visit (from the past 12 hour(s)). Radiologic Studies -   @lastxrresult@  CT Results  (Last 48 hours)    None        CXR Results  (Last 48 hours)    None            Medical Decision Making   - I am the first provider for this patient. - I reviewed the vital signs, available nursing notes, past medical history, past surgical history, family history and social history. - Initial assessment performed. The patients presenting problems have been discussed, and they are in agreement with the care plan formulated and outlined with them. I have encouraged them to ask questions as they arise throughout their visit. Vital Signs-Reviewed the patient's vital signs. No data found. Records Reviewed: Nursing Notes and Old Medical Records          ED Course:          Provider Notes (Medical Decision Making):   Patient presenting with altered mental status.  Pt has stable vitals and POC glucose was checked immediately upon arrival. DDx: medication toxicity, infection, anemia, electrolyte/metabolic anomoly, hypercapnea, stroke/bleed/mass, dehydration, illicit drug intoxication. Will obtain labwork, UA, EKG and CT imaging of the head, chest xray. Will consider adding toxicologic workup if history unclear or warrants further investigation of toxic source. Will continue to monitor and reassess for admission. [facility]       Procedures   Medical Decision Makingedical Decision Making  Performed by: Svetlana Elliott NP  PROCEDURES:  Procedures       Disposition   Disposition: Admitted to Floor Medical Floor the case was discussed with the admitting physician     Admitted    DISCHARGE PLAN:  1. Current Discharge Medication List      CONTINUE these medications which have NOT CHANGED    Details   levETIRAcetam 1,000 mg tablet Take 1 Tab by mouth two (2) times a day. Qty: 60 Tab, Refills: 0      rivaroxaban (Xarelto) 20 mg tab tablet Take 1 Tab by mouth daily (with dinner). Indications: treatment to prevent recurrence of a clot in a deep vein  Qty: 30 Tab, Refills: 0      amLODIPine (NORVASC) 10 mg tablet Take 1 Tab by mouth daily. Qty: 30 Tab, Refills: 0      desmopressin (DDAVP) 0.2 mg tablet Take 0.2 mg by mouth three (3) times daily. docusate sodium (Colace) 100 mg capsule Take 100 mg by mouth two (2) times a day. calcium carbonate (OS-GIN) 500 mg calcium (1,250 mg) tablet Take 2 Tabs by mouth every six (6) hours as needed for Indigestion. ferrous sulfate 300 mg (60 mg iron)/5 mL syrup Take  by mouth three (3) times daily. lamoTRIgine (LaMICtal) 100 mg tablet Take 100 mg by mouth two (2) times a day. Indications: convulsive seizures           2. Follow-up Information     Follow up With Specialties Details Why Alexander Rodas MD Family Medicine In 1 week  [address]  [phone]          3. Return to ED if worse   4.    Discharge Medication List as of 8/9/2021  4:29 PM      START taking these medications Details   !! levETIRAcetam (Keppra) 250 mg tablet Take 1 Tablet by mouth two (2) times a day., Normal, Disp-60 Tablet, R-0       !! - Potential duplicate medications found. Please discuss with provider. CONTINUE these medications which have NOT CHANGED    Details   !! levETIRAcetam 1,000 mg tablet Take 1 Tab by mouth two (2) times a day., Normal, Disp-60 Tab, R-0      rivaroxaban (Xarelto) 20 mg tab tablet Take 1 Tab by mouth daily (with dinner). Indications: treatment to prevent recurrence of a clot in a deep vein, Normal, Disp-30 Tab, R-0      amLODIPine (NORVASC) 10 mg tablet Take 1 Tab by mouth daily. , Normal, Disp-30 Tab, R-0      desmopressin (DDAVP) 0.2 mg tablet Take 0.2 mg by mouth three (3) times daily. , Historical Med      calcium carbonate (OS-GIN) 500 mg calcium (1,250 mg) tablet Take 2 Tabs by mouth every six (6) hours as needed for Indigestion. , Historical Med      ferrous sulfate 300 mg (60 mg iron)/5 mL syrup Take  by mouth three (3) times daily. , Historical Med      lamoTRIgine (LaMICtal) 100 mg tablet Take 100 mg by mouth two (2) times a day. Indications: convulsive seizures, Historical Med       !! - Potential duplicate medications found. Please discuss with provider. STOP taking these medications       docusate sodium (Colace) 100 mg capsule Comments:   Reason for Stopping:                 Diagnosis     Clinical Impression:   1. Hypernatremia    2. Seizure [facility])        Attestations:    Elodia Ball NP    Please note that this dictation was completed with [employer] voice recognition software. Quite often unanticipated grammatical, syntax, homophones, and other interpretive errors are inadvertently transcribed by the computer software. Please disregard these errors. Please excuse any errors that have escaped final proofreading. Thank you.

## 2021-08-05 NOTE — ED TRIAGE NOTES
Ems called for seizure, pt has h/o seizure on ems arrival pt was alert and oriented x 4. It was then reported to ems that pt has abnormal labs. Pt in no distress at this time.

## 2021-08-05 NOTE — CONSULTS
[address] Renal Consult Note      NAME:  Shadi Browning   :   1950   MRN:  [id number]     Requesting Physician Emil Echeverria MD   Reason for Consult:  hypernatremia     PCP:  Emil Echeverria MD     Date/Time:  2021 7:18 PM          Subjective:     CHIEF COMPLAINT: hypernatremia     HISTORY OF PRESENT ILLNESS:     Ms. Jeferson Moreno is a 66 y.o.  female who is admitted to the [facility] with hypernatremia. We are asked to evaluate for hypernatremia and acute kidney injury. Patient on previous admissions. 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 At one time she had multiple admissions to the hospital for hypernatremia. However, we were able to reduce those admissions after placement of a PEG tube to allow for free water administration. She is able to but she was not water to keep her sodium stable. Current admission was prompted by what appears to be a seizure. However, she's been seizure-free so far. Patient was seen on lunchtime. She had for lunch. She is a bit confused and thought that her PEG tube was placed this morning. However, she appeared comfortable and in no distress. She denies shortness of breath or chest pain. She does have hyperesthesia affecting the lower extremities. There was slight lower extremity edema. Past Medical History:   Diagnosis Date    Anemia     Cerebral aneurysm     with repair    Chronic kidney disease     Dementia ([address]     GI bleed     Hyperlipidemia     Hypertension     Partial diabetes insipidus [facility]     Rectal bleeding     Seizure ([facility]     Stroke [facility])         Past Surgical History:   Procedure Laterality Date    HX GI  2021    PEG replacement    HX OTHER SURGICAL      PEG TUBE       Social History     Tobacco Use    Smoking status: Never Smoker    Smokeless tobacco: Never Used   Substance Use Topics    Alcohol use: Not on file        History reviewed. No pertinent family history. No Known Allergies     Prior to Admission medications    Medication Sig Start Date End Date Taking? Authorizing Provider   levETIRAcetam 1,000 mg tablet Take 1 Tab by mouth two (2) times a day. 4/22/21  Yes Rimma Chao MD   rivaroxaban (Xarelto) 20 mg tab tablet Take 1 Tab by mouth daily (with dinner). Indications: treatment to prevent recurrence of a clot in a deep vein 4/22/21  Yes Saleem Dailey MD   amLODIPine (NORVASC) 10 mg tablet Take 1 Tab by mouth daily. 4/23/21  Yes Rimma Chao MD   desmopressin (DDAVP) 0.2 mg tablet Take 0.2 mg by mouth three (3) times daily. Yes Provider, Historical   docusate sodium (Colace) 100 mg capsule Take 100 mg by mouth two (2) times a day. Yes Provider, Historical   calcium carbonate (OS-GIN) 500 mg calcium (1,250 mg) tablet Take 2 Tabs by mouth every six (6) hours as needed for Indigestion. Yes Provider, Historical   ferrous sulfate 300 mg (60 mg iron)/5 mL syrup Take  by mouth three (3) times daily. Yes Provider, Historical   lamoTRIgine (LaMICtal) 100 mg tablet Take 100 mg by mouth two (2) times a day.  Indications: convulsive seizures   Yes Provider, Historical         Current Facility-Administered Medications:     amLODIPine (NORVASC) tablet 10 mg, 10 mg, Oral, DAILY, Ehsan Dailey MD, 10 mg at 08/05/21 0949    levETIRAcetam (KEPPRA) tablet 1,000 mg, 1,000 mg, Oral, BID, Ehsan Dailey MD, 1,000 mg at 08/05/21 0950    rivaroxaban (XARELTO) tablet 20 mg, 20 mg, Oral, DAILY WITH DINNER, Ehsan Dailey MD, 20 mg at 08/05/21 1802    calcium carbonate (TUMS) chewable tablet 1,000 mg [elemental], 1,000 mg, Oral, Q6H PRN, Ehsan Dailey MD    desmopressin (DDAVP) tablet 200 mcg, 0.2 mg, Oral, TID, Ehsan Dailey MD, 200 mcg at 08/05/21 0949    lamoTRIgine (LaMICtal) tablet 100 mg, 100 mg, Oral, BID, Ehsan Dailey MD, 100 mg at 08/05/21 0950    acetaminophen (TYLENOL) tablet 650 mg, 650 mg, Oral, Q6H PRN **OR** acetaminophen (TYLENOL) suppository 650 mg, 650 mg, Rectal, Q6H PRN, Jen Dailey MD    polyethylene glycol (MIRALAX) packet 17 g, 17 g, Oral, DAILY PRN, Jen Dailey MD    ondansetron (ZOFRAN ODT) tablet 4 mg, 4 mg, Oral, Q8H PRN **OR** ondansetron (ZOFRAN) injection 4 mg, 4 mg, IntraVENous, Q6H PRN, Ehsan Dailey MD    dextrose 5% infusion, 75 mL/hr, IntraVENous, CONTINUOUS, Ehsan Dailey MD, Last Rate: 75 mL/hr at 08/05/21 1117, 75 mL/hr at 08/05/21 1117    ferrous sulfate 300 mg (60 mg iron)/5 mL oral syrup 300 mg, 300 mg, Oral, BID WITH MEALS, Ehsan Dailey MD, 300 mg at 08/05/21 1803      Review of Systems:  A comprehensive review of systems was negative except for that written in the HPI. Objective:      VITALS:    Vital signs reviewed; most recent are:    Visit Vitals  BP (!) 180/80 (BP 1 Location: Right arm, BP Patient Position: At rest)   Pulse 70   Temp 97.7 °F (36.5 °C)   Resp 18   Ht 5' 6\" (1.676 m)   Wt 65.8 kg (145 lb)   SpO2 96%   BMI 23.40 kg/m²     SpO2 Readings from Last 6 Encounters:   08/05/21 96%   04/23/21 98%   01/13/21 97%   10/28/20 96%        No intake or output data in the 24 hours ending 08/05/21 1918         Exam:   Physical Exam:General appearance: alert, cooperative, no distress, appears stated age, disoriented  Eyes: negative findings: anicteric sclera and conjunctivae and sclerae normal  Neck: supple, symmetrical, trachea midline, no adenopathy, thyroid: not enlarged, symmetric, no tenderness/mass/nodules, no carotid bruit and no JVD  Lungs: clear to auscultation bilaterally  Heart: regular rate and rhythm, no S3 or S4, no rub  Abdomen: soft, non-tender.  Bowel sounds normal. No masses,  no organomegaly, PEG tube in place  Extremities: edema - trace pretibial edema  Neurologic: Grossly normal      LABS:  Recent Labs     08/05/21  0045   *   K Hemolyzed, recollect requested   *   CO2 28   BUN 16   CREA 1.42*   CA 9.1   ALB 3.1* No results for input(s): WBC, HGB, HCT, PLT, HGBEXT, HCTEXT, PLTEXT in the last 72 hours. Lab Results   Component Value Date/Time    Glucose (POC) 147 (H) 04/20/2021 08:23 PM    Glucose (POC) 102 (H) 04/20/2021 03:46 PM     No results for input(s): PH, PCO2, PO2, HCO3, FIO2 in the last 72 hours. No results for input(s): INR, INREXT in the last 72 hours. No results for input(s): JOSE RAUL, KU, CLU, CREAU in the last 72 hours. No lab exists for component: PROU    Assessment:   Renal Specific Problems  Hypernatremia  Acute azotemia, likely mild volume depletion with neurogenic bladder with obstruction  Partial central DI  Hypokalemia  Mild hypomagnesemia  Hypoalbuminemia        Plan:     Obtain/ Order: labs/cultures/radiology/procedures:  urine lytes and urine creatinine and spot urine protein and creatinine ratio and renal panel , recheck postvoid bladder scan      Therapeutic:    Discuss with her RN, since PEG tube is in place we will use water flushes at least 250 cc q.6 hours to reduce serum sodium. She will continue with DDAVP tablets.   Patient is eating and this should suffice to replace solute  Replete potassium if needed  If serum creatinine remains elevated and she has persistent increased postvoid bladder volume then Novoa catheter may be indicated      Risk of deterioration: medium      Total time spent with patient: 30 Minutes                  Discussed:  RN           ___________________________________________________    Attending Physician: Daniele Hall MD

## 2021-08-05 NOTE — H&P
History and Physical    NAME: Bahman Quiñones   :  1950   MRN:  [id number]     Date/Time:  2021 9:56 AM    Patient PCP: Thai Ashford MD  ______________________________________________________________________             Subjective:     CHIEF COMPLAINT: Seizure        HISTORY OF PRESENT ILLNESS:       Patient is a 66y.o. year old female with past medical history of a seizure disorder diabetes insipidus history of DVT presented to the ED this morning at [facility] via EMS after possible seizure. Upon EMS arrival to [address] [place] [address] the patient was alert and oriented x4, at this time the EMS team was alerted that the patient had abnormal labs. Patient was unable to provide any further history today. Labs drawn in the ED noted the patient to be hypernatremic. Upon arrival to the unit from the ED, the patient's bladder was noticeably distended per nursing, and 500cc of urine was obtained from the patient with a pure wick at this time. Per Dr. Nikki Mosher, a hutchins catheter was attempted, and the patient became upset at this time and refused the catheter. This morning, she does not complain of abdominal pain, dysuria or an inability to urinate. She does note swelling in her legs, which has taken place over the last 2-3 weeks and a fluttering contraction of her right eyelid. She also denies fever, shortness of breath, chest pain, nausea, vomiting, diarrhea or constipation.     Past Medical History:   Diagnosis Date    Anemia     Cerebral aneurysm     with repair    Chronic kidney disease     D[facility]     GI bleed     Hyperlipidemia     Hypertension     Partial diabetes insipidus ([facility]     Rectal bleeding     Seizure ([facility] [facility]     Stroke [facility])         Past Surgical History:   Procedure Laterality Date    HX GI  2021    PEG replacement    HX OTHER SURGICAL      PEG TUBE       Social History     Tobacco Use    Smoking status: Never Smoker    Smokeless tobacco: Never Used   Substance Use Topics    Alcohol use: Not on file        History reviewed. No pertinent family history. No Known Allergies     Prior to Admission medications    Medication Sig Start Date End Date Taking? Authorizing Provider   levETIRAcetam 1,000 mg tablet Take 1 Tab by mouth two (2) times a day. 4/22/21  Yes Simona Hobbs MD   rivaroxaban (Xarelto) 20 mg tab tablet Take 1 Tab by mouth daily (with dinner). Indications: treatment to prevent recurrence of a clot in a deep vein 4/22/21  Yes Donavan Dailey MD   amLODIPine (NORVASC) 10 mg tablet Take 1 Tab by mouth daily. 4/23/21  Yes Simona Hobbs MD   desmopressin (DDAVP) 0.2 mg tablet Take 0.2 mg by mouth three (3) times daily. Yes Provider, Historical   docusate sodium (Colace) 100 mg capsule Take 100 mg by mouth two (2) times a day. Yes Provider, Historical   calcium carbonate (OS-GIN) 500 mg calcium (1,250 mg) tablet Take 2 Tabs by mouth every six (6) hours as needed for Indigestion. Yes Provider, Historical   ferrous sulfate 300 mg (60 mg iron)/5 mL syrup Take  by mouth three (3) times daily. Yes Provider, Historical   lamoTRIgine (LaMICtal) 100 mg tablet Take 100 mg by mouth two (2) times a day.  Indications: convulsive seizures   Yes Provider, Historical         Current Facility-Administered Medications:     amLODIPine (NORVASC) tablet 10 mg, 10 mg, Oral, DAILY, Ehsan Dailey MD, 10 mg at 08/05/21 0949    levETIRAcetam (KEPPRA) tablet 1,000 mg, 1,000 mg, Oral, BID, Ehsan Dailey MD, 1,000 mg at 08/05/21 0950    rivaroxaban (XARELTO) tablet 20 mg, 20 mg, Oral, DAILY WITH DINNER, Donavan Dailey MD    calcium carbonate (TUMS) chewable tablet 1,000 mg [elemental], 1,000 mg, Oral, Q6H PRN, Ehsan Dailey MD    desmopressin (DDAVP) tablet 200 mcg, 0.2 mg, Oral, TID, Ehsan Dailey MD, 200 mcg at 08/05/21 0949    lamoTRIgine (LaMICtal) tablet 100 mg, 100 mg, Oral, BID, Ehsan Dailey MD, 100 mg at 08/05/21 0950    acetaminophen (TYLENOL) tablet 650 mg, 650 mg, Oral, Q6H PRN **OR** acetaminophen (TYLENOL) suppository 650 mg, 650 mg, Rectal, Q6H PRN, Colin Dailey MD    polyethylene glycol (MIRALAX) packet 17 g, 17 g, Oral, DAILY PRN, Colin Dailey MD    ondansetron (ZOFRAN ODT) tablet 4 mg, 4 mg, Oral, Q8H PRN **OR** ondansetron (ZOFRAN) injection 4 mg, 4 mg, IntraVENous, Q6H PRN, Ehsan Dailey MD    dextrose 5% infusion, 75 mL/hr, IntraVENous, CONTINUOUS, Ehsan Dailey MD, Last Rate: 75 mL/hr at 08/05/21 1117, 75 mL/hr at 08/05/21 1117    ferrous sulfate 300 mg (60 mg iron)/5 mL oral syrup 300 mg, 300 mg, Oral, BID WITH MEALS, Ehsan Dailey MD, 300 mg at 08/05/21 0948    LAB DATA REVIEWED:    Recent Results (from the past 24 hour(s))   METABOLIC PANEL, COMPREHENSIVE    Collection Time: 08/05/21 12:45 AM   Result Value Ref Range    Sodium 152 (H) 136 - 145 mmol/L    Potassium Hemolyzed, recollect requested 3.5 - 5.1 mmol/L    Chloride 120 (H) 97 - 108 mmol/L    CO2 28 21 - 32 mmol/L    Anion gap 4 (L) 5 - 15 mmol/L    Glucose 81 65 - 100 mg/dL    BUN 16 6 - 20 mg/dL    Creatinine 1.42 (H) 0.55 - 1.02 mg/dL    BUN/Creatinine ratio 11 (L) 12 - 20      GFR est AA 44 (L) >60 ml/min/1.73m2    GFR est non-AA 36 (L) >60 ml/min/1.73m2    Calcium 9.1 8.5 - 10.1 mg/dL    Bilirubin, total 0.4 0.2 - 1.0 mg/dL    AST (SGOT) Hemolyzed, recollect requested 15 - 37 U/L    ALT (SGPT) 16 12 - 78 U/L    Alk.  phosphatase 153 (H) 45 - 117 U/L    Protein, total 7.6 6.4 - 8.2 g/dL    Albumin 3.1 (L) 3.5 - 5.0 g/dL    Globulin 4.5 (H) 2.0 - 4.0 g/dL    A-G Ratio 0.7 (L) 1.1 - 2.2     URINALYSIS W/ REFLEX CULTURE    Collection Time: 08/05/21  5:20 AM    Specimen: Urine   Result Value Ref Range    Color Yellow/Straw      Appearance Clear Clear      Specific gravity 1.008 1.003 - 1.030      pH (UA) 7.0 5.0 - 8.0      Protein Negative Negative mg/dL    Glucose Negative Negative mg/dL    Ketone Negative Negative mg/dL    Bilirubin Negative Negative      Blood Small (A) Negative      Urobilinogen 0.1 0.1 - 1.0 EU/dL    Nitrites Negative Negative      Leukocyte Esterase Negative Negative      UA:UC IF INDICATED Culture not indicated by UA result Culture not indicated by UA result      WBC 0-4 0 - 4 /hpf    RBC 0-5 0 - 5 /hpf    Bacteria Negative Negative /hpf       XR Results (most recent):  Results from Hospital Encounter encounter on 04/19/21    XR CHEST SNGL V    Narrative  Multiple seizures. Comparison chest x-ray 4/24/2020. FINDINGS: Frontal single view of the chest. Normal heart size. Calcified aorta. No vascular congestion or pulmonary edema. The lungs are similarly inflated. Left lower lung scarring. No infiltrate, effusion, pneumothorax. No free air  under diaphragm. Bony structures intact. Impression  No acute findings. No orders to display        Review of Systems:  Constitutional: Negative for chills and fever. HENT: Negative. Eyes: Negative. Respiratory: Negative. Cardiovascular: Negative. Gastrointestinal: Negative for abdominal pain and nausea. Skin: Negative. Neurological: Negative. Objective:   VITALS:    Visit Vitals  BP (!) 180/80 (BP 1 Location: Right arm, BP Patient Position: At rest)   Pulse 70   Temp 97.7 °F (36.5 °C)   Resp 18   Ht 5' 6\" (1.676 m)   Wt 65.8 kg (145 lb)   SpO2 96%   BMI 23.40 kg/m²       Physical Exam:   Constitutional: pt is oriented to person, place, and time. HENT:   Head: Normocephalic and atraumatic. Eyes: Pupils are equal, round, and reactive to light. EOM are normal. Patient is having right sided \"blinking\" like extraocular contractions at the bedside. Cardiovascular: Normal rate, regular rhythm and normal heart sounds. 1+ edema and mild tenderness over the bilateral lower extremities. Pulmonary/Chest: Breath sounds normal. No wheezes. No rales. Exhibits no tenderness. Abdominal: Soft. Bowel sounds are normal. There is no abdominal tenderness.  There is no rebound and no guarding. Musculoskeletal: Normal range of motion. Neurological: pt is alert and oriented to person, place, and time. Alert. Normal strength. No cranial nerve deficit or sensory deficit     ASSESSMENT & PLAN:  1. Hypernatremia  2. Seizure disorder  3. Leg edema  4. Diabetes insipidus  5. History of DVT  6. Acute kidney injury  7.   Dehydration    Plan:    Start on D5 water at 75 cc/h  Monitor sodium level and renal function  Nephrology consult    Continue Keppra for seizures  Seizures precaution  Fall precaution  If further seizures will do neurology consult  We will do bladder ultrasound for urinary retention      ________________________________________________________________________    Signed: Eddie Nolasco MD

## 2021-08-05 NOTE — PROGRESS NOTES
PHYSICAL THERAPY EVALUATION  Patient: Eduard Mejia (34 y.o. female)  Date: 8/5/2021  Primary Diagnosis: Hypernatremia [E87.0]  Seizure ([facility] [address] [R56.9]        Precautions: falls    ASSESSMENT  This is a 65 y/o female who  presented to the ED  via EMS from [address] [place] [address] after possible seizure . EMS team was alerted by staff at SNF  that the patient had abnormal labs. Pt admitted on 8/5/21 for hypernatremia and seizure . Pt has history of a seizure disorder ,  diabetes insipidus and  history of DVT . Pt received semi-supine in bed , agreeable for PT eval/ tx. Pt is A& O x 4 , as per pt report , she is a LTC resident of SNF ,  was IND with ADL's and mod I for functional transfers/mobility with RW / SPC  . Pt reports that she can walk but mostly used w/c prior to admission . Based on the objective data described below, the patient presents with confusion ,  decreased safety awareness, decreased strength , 3+/5 grossly in  b/l LE , balance deficits , generalized weakness , decreased activity tolerance and increased need for  assist with functional mobility ( needs SBA for rolling from side to side  , SBA for  for supine <>sit transfers , intact static sitting balance ,  CGA for sit <>stand and SPT , intact static  standing balance , is able to ambulate - 39' with RW, CGA with slow frank and decreased step length b/l Le ) . Pt would benefit from skilled PT services while at [facility] in order to increase safety and independence with functional transfers/mobility. .Recommend d/c  back to SNF when medically appropriate . Current Level of Function Impacting Discharge (mobility/balance): Requires CGA for  transfers/mobility    Functional Outcome Measure: The patient scored 18 on the AM PAC basic mobility outcome measure which is indicative of medium complexity.       Other factors to consider for discharge:  PLOF           PLAN :  Recommendations and Planned Interventions: bed mobility training, transfer training, gait training, therapeutic exercises, neuromuscular re-education, patient and family training/education, and therapeutic activities      Frequency/Duration: Patient will be followed by physical therapy:  3 times a week to address goals. Recommendation for discharge: (in order for the patient to meet his/her long term goals)  SNF    This discharge recommendation:  Has been made in collaboration with the attending provider and/or case management    IF patient discharges home will need the following DME: none         SUBJECTIVE:   Patient stated I don't know if I can walk now     OBJECTIVE DATA SUMMARY:   HISTORY:    Past Medical History:   Diagnosis Date    Anemia     Cerebral aneurysm     with repair    Chronic kidney disease     Dementia ([facility] [address]     GI bleed     Hyperlipidemia     Hypertension     Partial diabetes insipidus ([facility] [address]     Rectal bleeding     [facility]     Stroke [facility])      Past Surgical History:   Procedure Laterality Date    HX GI  07/08/2021    PEG replacement    HX OTHER SURGICAL      PEG TUBE       Personal factors and/or comorbidities impacting plan of care:     Home Situation  Home Environment: Skilled nursing facility  # Steps to Enter: 0  One/Two Story Residence: Other (Comment)  Living Alone: No  Support Systems: Skilled nursing facility  Patient Expects to be Discharged to[de-identified] Long-term care  Current DME Used/Available at Home: Cane, straight    PLOF: Pt IND for ADLS/IADLS, mod I with mobility with SPC/RW as needed prior to admission. EXAMINATION/PRESENTATION/DECISION MAKING:   Critical Behavior:  Neurologic State: Alert, Confused  Orientation Level: Oriented X4  Cognition: Decreased attention/concentration  Safety/Judgement: Decreased awareness of need for safety  Hearing:   Auditory  Auditory Impairment: None  Skin:  intact where exposed    Range Of Motion:  AROM: Within functional limits     Strength:    Strength: Generally decreased, functional (3+/5 grossly for b/l LE)    Tone & Sensation:   Tone: Normal    Sensation: Intact    Functional Mobility:  Bed Mobility:  Rolling: Stand-by assistance  Supine to Sit: Stand-by assistance  Sit to Supine: Stand-by assistance  Scooting: Stand-by assistance  Transfers:  Sit to Stand: Contact guard assistance  Stand to Sit: Contact guard assistance  Stand Pivot Transfers: Contact guard assistance       Balance:   Sitting: Intact  Standing: Impaired; Without support  Standing - Static: Fair;Constant support  Standing - Dynamic : Fair;Constant support  Ambulation/Gait Training:  Distance (ft): 45 Feet (ft)  Assistive Device: Walker, rolling  Ambulation - Level of Assistance: Contact guard assistance    Speed/Desiree: Slow  Step Length: Right shortened;Left shortened      Functional Measure:    [address] [facility] [address] [address] AM-PAC 6 Clicks         Basic Mobility Inpatient Short Form  How much difficulty does the patient currently have. .. Unable A Lot A Little None   1. Turning over in bed (including adjusting bedclothes, sheets and blankets)? [] 1   [] 2   [x] 3   [] 4   2. Sitting down on and standing up from a chair with arms ( e.g., wheelchair, bedside commode, etc.)   [] 1   [] 2   [x] 3   [] 4   3. Moving from lying on back to sitting on the side of the bed? [] 1   [] 2   [x] 3   [] 4          How much help from another person does the patient currently need. .. Total A Lot A Little None   4. Moving to and from a bed to a chair (including a wheelchair)? [] 1   [] 2   [x] 3   [] 4   5. Need to walk in hospital room? [] 1   [] 2   [x] 3   [] 4   6. Climbing 3-5 steps with a railing? [] 1   [] 2   [x] 3   [] 4   © 2007, Trustees of [address] [facility] [address] [address], under license to [employer]. All rights reserved     Score:  Initial: 18 Most Recent: X (Date: 8/5/21-- )   Interpretation of Tool:  Represents activities that are increasingly more difficult (i.e. Bed mobility, Transfers, Gait).   Score 24 23 22-20 19-15 14-10 9-7 6   Modifier CH CI CJ CK CL CM CN          Physical Therapy Evaluation Charge Determination   History Examination Presentation Decision-Making   LOW Complexity : Zero comorbidities / personal factors that will impact the outcome / POC MEDIUM Complexity : 3 Standardized tests and measures addressing body structure, function, activity limitation and / or participation in recreation  LOW Complexity : Stable, uncomplicated  Other Functional Measure [facility] 6 medium complexity      Based on the above components, the patient evaluation is determined to be of the following complexity level: LOW     Pain Ratin/10    Activity Tolerance:   Fair  Please refer to the flowsheet for vital signs taken during this treatment. After treatment patient left in no apparent distress:   Supine in bed, Call bell within reach, and Bed / chair alarm activated    COMMUNICATION/EDUCATION:   The patients plan of care was discussed with: Registered nurse. Fall prevention education was provided and the patient/caregiver indicated understanding. and Patient/family agree to work toward stated goals and plan of care. Problem: Mobility Impaired (Adult and Pediatric)  Goal: *Acute Goals and Plan of Care (Insert Text)  Description: Pt will be I with LE HEP in 7 days. Pt will perform bed mobility with mod I in 7 days. Pt will perform transfers with mod I in 7 days. Pt will amb >200 feet with LRAD safely with mod I in 7 days.    Outcome: Not Met       Thank you for this referral.  Radha Sandhu   Time Calculation: 23 mins

## 2021-08-05 NOTE — ED NOTES
TRANSFER - OUT REPORT:    Verbal report given to Ramon on Shadi Browning  being transferred to [facility] for routine progression of care       Report consisted of patients Situation, Background, Assessment and   Recommendations(SBAR). Information from the following report(s) SBAR, ED Summary and MAR was reviewed with the receiving nurse. Lines:       Opportunity for questions and clarification was provided.       Patient transported with:   Monitor  Registered Nurse

## 2021-08-05 NOTE — PROGRESS NOTES
Reason for Admission:  Seizure                     RUR Score:    15%                 Plan for utilizing home health:        None  PCP: First and Last name:  Rimma Chao MD     Name of Practice:    Are you a current patient: Yes/No:    Approximate date of last visit:    Can you participate in a virtual visit with your PCP:                     Current Advanced Directive/Advance Care Plan: Full Code      Healthcare Decision Maker:   Click here to complete [address] including selection of the Healthcare Decision Maker Relationship (ie \"Primary\")             Primary Decision Maker: Luana Barfield - [phone]                  Transition of Care Plan:       * Disposition- LTC resident with [facility]  * BLS transport    Benny Shepherd presented to hospital from [facility] with chief complaint of seizure. PMH significant for seizure disorder , dementia and stoke. [email or web address] verified demographics. The plan is for patient to return to [facility] when cleared for discharge.     Care Management Interventions  Transition of Care Consult (CM Consult): LTAC ([facility])  The Plan for Transition of Care is Related to the Following Treatment Goals : [facility]  The Patient and/or Patient Representative was Provided with a Choice of Provider and Agrees with the Discharge Plan?: Yes  Name of the Patient Representative Who was Provided with a Choice of Provider and Agrees with the Discharge Plan: spouse  Freedom of Choice List was Provided with Basic Dialogue that Supports the Patient's Individualized Plan of Care/Goals, Treatment Preferences and Shares the Quality Data Associated with the Providers?: Yes  [place] Resource Information Provided?: No  Discharge Location  Discharge Placement: [address] [place] [address]

## 2021-08-05 NOTE — PROGRESS NOTES
[facility] called to notify that spouse has been admitted but no answer at the number provided by [facility].

## 2021-08-05 NOTE — MED STUDENT NOTES
History and Physical    NAME: Rosana Vance   :  1950   MRN:  [id number]     Date/Time:  2021 9:56 AM    Patient PCP: Valery Redmond MD  ______________________________________________________________________             Subjective:     CHIEF COMPLAINT: Seizure        HISTORY OF PRESENT ILLNESS:       Patient is a 66y.o. year old female with past medical history of a seizure disorder [place] [facility] [place] possible seizure. Upon EMS arrival to [address] [place] [address] the patient was alert and oriented x4, at this time the EMS team was alerted that the patient had abnormal labs. Patient was unable to provide any further history today. Labs drawn in the ED noted the patient to be hypernatremic. Upon arrival to the unit from the ED, the patient's bladder was noticeably distended per nursing, and 500cc of urine was obtained from the patient with a pure wick at this time. Per Dr. Sandra Kumar, a hutchins catheter was attempted, and the patient became upset at this time and refused the catheter. This morning, she does not complain of abdominal pain, dysuria or an inability to urinate. She does note swelling in her legs, which has taken place over the last 2-3 weeks and a fluttering contraction of her right eyelid. She also denies fever, shortness of breath, chest pain, nausea, vomiting, diarrhea or constipation.     Past Medical History:   Diagnosis Date    Anemia     Cerebral aneurysm     with repair    Chronic kidney disease     [facility]     GI bleed     Hyperlipidemia     Hypertension     Partial diabetes insipidus ([facility]     Rectal bleeding     Seizure ([facility]     Stroke [facility])         Past Surgical History:   Procedure Laterality Date    HX GI  2021    PEG replacement    HX OTHER SURGICAL      PEG TUBE       Social History     Tobacco Use    Smoking status: Never Smoker    Smokeless tobacco: Never Used   Substance Use Topics    Alcohol use: Not on file History reviewed. No pertinent family history. No Known Allergies     Prior to Admission medications    Medication Sig Start Date End Date Taking? Authorizing Provider   levETIRAcetam 1,000 mg tablet Take 1 Tab by mouth two (2) times a day. 4/22/21  Yes Sawyer Garcia MD   rivaroxaban (Xarelto) 20 mg tab tablet Take 1 Tab by mouth daily (with dinner). Indications: treatment to prevent recurrence of a clot in a deep vein 4/22/21  Yes Mohiuddin, Curtis Halsted, MD   amLODIPine (NORVASC) 10 mg tablet Take 1 Tab by mouth daily. 4/23/21  Yes Sawyer Garcia MD   desmopressin (DDAVP) 0.2 mg tablet Take 0.2 mg by mouth three (3) times daily. Yes Provider, Historical   docusate sodium (Colace) 100 mg capsule Take 100 mg by mouth two (2) times a day. Yes Provider, Historical   calcium carbonate (OS-GIN) 500 mg calcium (1,250 mg) tablet Take 2 Tabs by mouth every six (6) hours as needed for Indigestion. Yes Provider, Historical   ferrous sulfate 300 mg (60 mg iron)/5 mL syrup Take  by mouth three (3) times daily. Yes Provider, Historical   lamoTRIgine (LaMICtal) 100 mg tablet Take 100 mg by mouth two (2) times a day.  Indications: convulsive seizures   Yes Provider, Historical         Current Facility-Administered Medications:     amLODIPine (NORVASC) tablet 10 mg, 10 mg, Oral, DAILY, Ehsan Dailey MD, 10 mg at 08/05/21 0949    levETIRAcetam (KEPPRA) tablet 1,000 mg, 1,000 mg, Oral, BID, Ehsan Dailey MD, 1,000 mg at 08/05/21 0950    rivaroxaban (XARELTO) tablet 20 mg, 20 mg, Oral, DAILY WITH DINNER, Mohiuddin, Curtis Halsted, MD    calcium carbonate (TUMS) chewable tablet 1,000 mg [elemental], 1,000 mg, Oral, Q6H PRN, Ehsan Dailey MD    desmopressin (DDAVP) tablet 200 mcg, 0.2 mg, Oral, TID, Mohiuddin, Curtis Halsted, MD    lamoTRIgine (LaMICtal) tablet 100 mg, 100 mg, Oral, BID, Ehsan Dailey MD, 100 mg at 08/05/21 0950    acetaminophen (TYLENOL) tablet 650 mg, 650 mg, Oral, Q6H PRN **OR** acetaminophen (TYLENOL) suppository 650 mg, 650 mg, Rectal, Q6H PRN, Colin Dailey MD    polyethylene glycol (MIRALAX) packet 17 g, 17 g, Oral, DAILY PRN, Colin Dailey MD    ondansetron (ZOFRAN ODT) tablet 4 mg, 4 mg, Oral, Q8H PRN **OR** ondansetron (ZOFRAN) injection 4 mg, 4 mg, IntraVENous, Q6H PRN, Ehsan Dailey MD    dextrose 5% infusion, 75 mL/hr, IntraVENous, CONTINUOUS, Colin Dailey MD    ferrous sulfate 300 mg (60 mg iron)/5 mL oral syrup 300 mg, 300 mg, Oral, BID WITH MEALS, Ehsan Dailey MD, 300 mg at 08/05/21 0948    LAB DATA REVIEWED:    Recent Results (from the past 24 hour(s))   METABOLIC PANEL, COMPREHENSIVE    Collection Time: 08/05/21 12:45 AM   Result Value Ref Range    Sodium 152 (H) 136 - 145 mmol/L    Potassium Hemolyzed, recollect requested 3.5 - 5.1 mmol/L    Chloride 120 (H) 97 - 108 mmol/L    CO2 28 21 - 32 mmol/L    Anion gap 4 (L) 5 - 15 mmol/L    Glucose 81 65 - 100 mg/dL    BUN 16 6 - 20 mg/dL    Creatinine 1.42 (H) 0.55 - 1.02 mg/dL    BUN/Creatinine ratio 11 (L) 12 - 20      GFR est AA 44 (L) >60 ml/min/1.73m2    GFR est non-AA 36 (L) >60 ml/min/1.73m2    Calcium 9.1 8.5 - 10.1 mg/dL    Bilirubin, total 0.4 0.2 - 1.0 mg/dL    AST (SGOT) Hemolyzed, recollect requested 15 - 37 U/L    ALT (SGPT) 16 12 - 78 U/L    Alk.  phosphatase 153 (H) 45 - 117 U/L    Protein, total 7.6 6.4 - 8.2 g/dL    Albumin 3.1 (L) 3.5 - 5.0 g/dL    Globulin 4.5 (H) 2.0 - 4.0 g/dL    A-G Ratio 0.7 (L) 1.1 - 2.2     URINALYSIS W/ REFLEX CULTURE    Collection Time: 08/05/21  5:20 AM    Specimen: Urine   Result Value Ref Range    Color Yellow/Straw      Appearance Clear Clear      Specific gravity 1.008 1.003 - 1.030      pH (UA) 7.0 5.0 - 8.0      Protein Negative Negative mg/dL    Glucose Negative Negative mg/dL    Ketone Negative Negative mg/dL    Bilirubin Negative Negative      Blood Small (A) Negative      Urobilinogen 0.1 0.1 - 1.0 EU/dL    Nitrites Negative Negative      Leukocyte Esterase Negative Negative      UA:UC IF INDICATED Culture not indicated by UA result Culture not indicated by UA result      WBC 0-4 0 - 4 /hpf    RBC 0-5 0 - 5 /hpf    Bacteria Negative Negative /hpf       XR Results (most recent):  Results from Hospital Encounter encounter on 04/19/21    XR CHEST SNGL V    Narrative  Multiple seizures. Comparison chest x-ray 4/24/2020. FINDINGS: Frontal single view of the chest. Normal heart size. Calcified aorta. No vascular congestion or pulmonary edema. The lungs are similarly inflated. Left lower lung scarring. No infiltrate, effusion, pneumothorax. No free air  under diaphragm. Bony structures intact. Impression  No acute findings. No orders to display        Review of Systems:  Constitutional: Negative for chills and fever. HENT: Negative. Eyes: Negative. Respiratory: Negative. Cardiovascular: Negative. Gastrointestinal: Negative for abdominal pain and nausea. Skin: Negative. Neurological: Negative. Objective:   VITALS:    Visit Vitals  BP (!) 180/80 (BP 1 Location: Right arm, BP Patient Position: At rest)   Pulse 70   Temp 97.7 °F (36.5 °C)   Resp 18   Ht 5' 6\" (1.676 m)   Wt 145 lb (65.8 kg)   SpO2 96%   BMI 23.40 kg/m²       Physical Exam:   Constitutional: pt is oriented to person, place, and time. HENT:   Head: Normocephalic and atraumatic. Eyes: Pupils are equal, round, and reactive to light. EOM are normal. Patient is having right sided \"blinking\" like extraocular contractions at the bedside. Cardiovascular: Normal rate, regular rhythm and normal heart sounds. 1+ edema and mild tenderness over the bilateral lower extremities. Pulmonary/Chest: Breath sounds normal. No wheezes. No rales. Exhibits no tenderness. Abdominal: Soft. Bowel sounds are normal. There is no abdominal tenderness. There is no rebound and no guarding. Musculoskeletal: Normal range of motion.    Neurological: pt is alert and oriented to person, place, and time. Alert. Normal strength. No cranial nerve deficit or sensory deficit. Displays a negative Romberg sign. ASSESSMENT & PLAN:  1. Hypernatremia  2. Seizure disorder  3. Leg edema  4. Muscle spasm  5. Possible mild heart failure    Plan:  Continue dextrose 5% infusion at current rate  Order metabolic panel  Order BNP  Consider switching to ACE-inhibitor or ARB for blood pressure control for nephro-protective effect due to mild rise in Creatinine since previous record    ________________________________________________________________________    Signed: Sanjuana Wood  *ATTENTION:  This note has been created by a medical student for educational purposes only. Please do not refer to the content of this note for clinical decision-making, billing, or other purposes. Please see attending physicians note to obtain clinical information on this patient. *

## 2021-08-06 LAB
ALBUMIN SERPL-MCNC: 3.5 G/DL (ref 3.5–5)
ALBUMIN/GLOB SERPL: 0.8 {RATIO} (ref 1.1–2.2)
ALP SERPL-CCNC: 158 U/L (ref 45–117)
ALT SERPL-CCNC: 13 U/L (ref 12–78)
ANION GAP SERPL CALC-SCNC: 6 MMOL/L (ref 5–15)
AST SERPL W P-5'-P-CCNC: 9 U/L (ref 15–37)
BASOPHILS # BLD: 0 K/UL (ref 0–0.1)
BASOPHILS NFR BLD: 0 % (ref 0–1)
BILIRUB SERPL-MCNC: 0.5 MG/DL (ref 0.2–1)
BUN SERPL-MCNC: 18 MG/DL (ref 6–20)
BUN/CREAT SERPL: 12 (ref 12–20)
CA-I BLD-MCNC: 9.1 MG/DL (ref 8.5–10.1)
CHLORIDE SERPL-SCNC: 113 MMOL/L (ref 97–108)
CO2 SERPL-SCNC: 30 MMOL/L (ref 21–32)
CREAT SERPL-MCNC: 1.56 MG/DL (ref 0.55–1.02)
DIFFERENTIAL METHOD BLD: ABNORMAL
EOSINOPHIL # BLD: 0.2 K/UL (ref 0–0.4)
EOSINOPHIL NFR BLD: 4 % (ref 0–7)
ERYTHROCYTE [DISTWIDTH] IN BLOOD BY AUTOMATED COUNT: 15.1 % (ref 11.5–14.5)
GLOBULIN SER CALC-MCNC: 4.5 G/DL (ref 2–4)
GLUCOSE SERPL-MCNC: 165 MG/DL (ref 65–100)
HCT VFR BLD AUTO: 49.6 % (ref 35–47)
HGB BLD-MCNC: 15.6 G/DL (ref 11.5–16)
IMM GRANULOCYTES # BLD AUTO: 0 K/UL (ref 0–0.04)
IMM GRANULOCYTES NFR BLD AUTO: 0 % (ref 0–0.5)
LYMPHOCYTES # BLD: 2.3 K/UL (ref 0.8–3.5)
LYMPHOCYTES NFR BLD: 49 % (ref 12–49)
MCH RBC QN AUTO: 30.2 PG (ref 26–34)
MCHC RBC AUTO-ENTMCNC: 31.5 G/DL (ref 30–36.5)
MCV RBC AUTO: 96.1 FL (ref 80–99)
MONOCYTES # BLD: 0.3 K/UL (ref 0–1)
MONOCYTES NFR BLD: 6 % (ref 5–13)
NEUTS SEG # BLD: 1.9 K/UL (ref 1.8–8)
NEUTS SEG NFR BLD: 41 % (ref 32–75)
NRBC # BLD: 0 K/UL (ref 0–0.01)
NRBC BLD-RTO: 0 PER 100 WBC
PLATELET # BLD AUTO: 200 K/UL (ref 150–400)
PMV BLD AUTO: 9.8 FL (ref 8.9–12.9)
POTASSIUM SERPL-SCNC: 3.2 MMOL/L (ref 3.5–5.1)
PROT SERPL-MCNC: 8 G/DL (ref 6.4–8.2)
RBC # BLD AUTO: 5.16 M/UL (ref 3.8–5.2)
SODIUM SERPL-SCNC: 149 MMOL/L (ref 136–145)
WBC # BLD AUTO: 4.8 K/UL (ref 3.6–11)

## 2021-08-06 PROCEDURE — 74011250637 HC RX REV CODE- 250/637: Performed by: FAMILY MEDICINE

## 2021-08-06 PROCEDURE — 74011250636 HC RX REV CODE- 250/636: Performed by: FAMILY MEDICINE

## 2021-08-06 PROCEDURE — 85025 COMPLETE CBC W/AUTO DIFF WBC: CPT

## 2021-08-06 PROCEDURE — 65660000001 HC RM ICU INTERMED STEPDOWN

## 2021-08-06 PROCEDURE — 36415 COLL VENOUS BLD VENIPUNCTURE: CPT

## 2021-08-06 PROCEDURE — 80053 COMPREHEN METABOLIC PANEL: CPT

## 2021-08-06 RX ORDER — POTASSIUM CHLORIDE 1.5 G/1.77G
40 POWDER, FOR SOLUTION ORAL DAILY
Status: DISCONTINUED | OUTPATIENT
Start: 2021-08-07 | End: 2021-08-09 | Stop reason: HOSPADM

## 2021-08-06 RX ADMIN — LEVETIRACETAM 1000 MG: 500 TABLET ORAL at 21:49

## 2021-08-06 RX ADMIN — LEVETIRACETAM 1000 MG: 500 TABLET ORAL at 09:51

## 2021-08-06 RX ADMIN — DESMOPRESSIN ACETATE 200 MCG: 0.2 TABLET ORAL at 21:50

## 2021-08-06 RX ADMIN — LAMOTRIGINE 100 MG: 100 TABLET ORAL at 21:50

## 2021-08-06 RX ADMIN — RIVAROXABAN 20 MG: 20 TABLET, FILM COATED ORAL at 18:24

## 2021-08-06 RX ADMIN — DEXTROSE MONOHYDRATE 75 ML/HR: 50 INJECTION, SOLUTION INTRAVENOUS at 21:53

## 2021-08-06 RX ADMIN — AMLODIPINE BESYLATE 10 MG: 5 TABLET ORAL at 09:50

## 2021-08-06 RX ADMIN — MINERAL SUPPLEMENT IRON 300 MG / 5 ML STRENGTH LIQUID 100 PER BOX UNFLAVORED 300 MG: at 18:24

## 2021-08-06 RX ADMIN — DEXTROSE MONOHYDRATE 75 ML/HR: 50 INJECTION, SOLUTION INTRAVENOUS at 09:55

## 2021-08-06 RX ADMIN — LAMOTRIGINE 100 MG: 100 TABLET ORAL at 09:50

## 2021-08-06 RX ADMIN — DESMOPRESSIN ACETATE 200 MCG: 0.2 TABLET ORAL at 18:24

## 2021-08-06 RX ADMIN — DESMOPRESSIN ACETATE 200 MCG: 0.2 TABLET ORAL at 09:51

## 2021-08-06 RX ADMIN — MINERAL SUPPLEMENT IRON 300 MG / 5 ML STRENGTH LIQUID 100 PER BOX UNFLAVORED 300 MG: at 09:51

## 2021-08-06 NOTE — PROGRESS NOTES
General Daily Progress Note          Patient Name:   Bahman Quiñones       YOB: 1950       Age:  66 y.o. Admit Date: 8/5/2021      Subjective:     Patient is a 66y.o. year old female with past medical history of a seizure disorder, diabetes insipidus, history of DVT presented to the ED yesterday morning at [facility] EMS after possible seizure due to suspected hypernatremia. Patient is alert and oriented in her bed this morning. She is mildly confused as she believes she has been in the hospital for 12 months. She notes improvement in the twitching of her right eye since yesterday. She otherwise denies chest pain, shortness of breath, fever, chills, dysuria, trouble urinating, nausea, vomiting or constipation. Objective:     Visit Vitals  BP (!) 148/93   Pulse 86   Temp 98 °F (36.7 °C)   Resp 18   Ht 5' 6\" (1.676 m)   Wt 65.8 kg (145 lb)   SpO2 98%   BMI 23.40 kg/m²        Recent Results (from the past 24 hour(s))   METABOLIC PANEL, COMPREHENSIVE    Collection Time: 08/06/21  9:28 AM   Result Value Ref Range    Sodium 149 (H) 136 - 145 mmol/L    Potassium 3.2 (L) 3.5 - 5.1 mmol/L    Chloride 113 (H) 97 - 108 mmol/L    CO2 30 21 - 32 mmol/L    Anion gap 6 5 - 15 mmol/L    Glucose 165 (H) 65 - 100 mg/dL    BUN 18 6 - 20 mg/dL    Creatinine 1.56 (H) 0.55 - 1.02 mg/dL    BUN/Creatinine ratio 12 12 - 20      GFR est AA 40 (L) >60 ml/min/1.73m2    GFR est non-AA 33 (L) >60 ml/min/1.73m2    Calcium 9.1 8.5 - 10.1 mg/dL    Bilirubin, total 0.5 0.2 - 1.0 mg/dL    AST (SGOT) 9 (L) 15 - 37 U/L    ALT (SGPT) 13 12 - 78 U/L    Alk.  phosphatase 158 (H) 45 - 117 U/L    Protein, total 8.0 6.4 - 8.2 g/dL    Albumin 3.5 3.5 - 5.0 g/dL    Globulin 4.5 (H) 2.0 - 4.0 g/dL    A-G Ratio 0.8 (L) 1.1 - 2.2     CBC WITH AUTOMATED DIFF    Collection Time: 08/06/21  9:28 AM   Result Value Ref Range    WBC 4.8 3.6 - 11.0 K/uL    RBC 5.16 3.80 - 5.20 M/uL    HGB 15.6 11.5 - 16.0 g/dL    HCT 49.6 (H) 35.0 - 47.0 %    MCV 96.1 80.0 - 99.0 FL    MCH 30.2 26.0 - 34.0 PG    MCHC 31.5 30.0 - 36.5 g/dL    RDW 15.1 (H) 11.5 - 14.5 %    PLATELET 453 568 - 244 K/uL    MPV 9.8 8.9 - 12.9 FL    NRBC 0.0 0.0  WBC    ABSOLUTE NRBC 0.00 0.00 - 0.01 K/uL    NEUTROPHILS 41 32 - 75 %    LYMPHOCYTES 49 12 - 49 %    MONOCYTES 6 5 - 13 %    EOSINOPHILS 4 0 - 7 %    BASOPHILS 0 0 - 1 %    IMMATURE GRANULOCYTES 0 0 - 0.5 %    ABS. NEUTROPHILS 1.9 1.8 - 8.0 K/UL    ABS. LYMPHOCYTES 2.3 0.8 - 3.5 K/UL    ABS. MONOCYTES 0.3 0.0 - 1.0 K/UL    ABS. EOSINOPHILS 0.2 0.0 - 0.4 K/UL    ABS. BASOPHILS 0.0 0.0 - 0.1 K/UL    ABS. IMM. GRANS. 0.0 0.00 - 0.04 K/UL    DF AUTOMATED       [unfilled]      Review of Systems    Constitutional: Negative for chills and fever. HENT: Negative. Eyes: Negative. Respiratory: Negative. Cardiovascular: Negative. Gastrointestinal: Negative for abdominal pain and nausea. Skin: Negative. Neurological: Negative. Physical Exam:      Constitutional: pt is oriented to person, place. She is confused at the time and her duration of stay in the hospital.  HENT:   Head: Normocephalic and atraumatic. Eyes: Pupils are equal, round, and reactive to light. EOM are normal.   Cardiovascular: Normal rate, regular rhythm and normal heart sounds. Pulmonary/Chest: Breath sounds normal. No wheezes. No rales. Exhibits no tenderness. Abdominal: Soft. Bowel sounds are normal. There is no abdominal tenderness. There is no rebound and no guarding. Musculoskeletal: Normal range of motion. Neurological: pt is alert and oriented to person, place, and time.      US RETROPERITONEUM COMP   Final Result   Normal exam              Recent Results (from the past 24 hour(s))   METABOLIC PANEL, COMPREHENSIVE    Collection Time: 08/06/21  9:28 AM   Result Value Ref Range    Sodium 149 (H) 136 - 145 mmol/L    Potassium 3.2 (L) 3.5 - 5.1 mmol/L    Chloride 113 (H) 97 - 108 mmol/L    CO2 30 21 - 32 mmol/L    Anion gap 6 5 - 15 mmol/L    Glucose 165 (H) 65 - 100 mg/dL    BUN 18 6 - 20 mg/dL    Creatinine 1.56 (H) 0.55 - 1.02 mg/dL    BUN/Creatinine ratio 12 12 - 20      GFR est AA 40 (L) >60 ml/min/1.73m2    GFR est non-AA 33 (L) >60 ml/min/1.73m2    Calcium 9.1 8.5 - 10.1 mg/dL    Bilirubin, total 0.5 0.2 - 1.0 mg/dL    AST (SGOT) 9 (L) 15 - 37 U/L    ALT (SGPT) 13 12 - 78 U/L    Alk. phosphatase 158 (H) 45 - 117 U/L    Protein, total 8.0 6.4 - 8.2 g/dL    Albumin 3.5 3.5 - 5.0 g/dL    Globulin 4.5 (H) 2.0 - 4.0 g/dL    A-G Ratio 0.8 (L) 1.1 - 2.2     CBC WITH AUTOMATED DIFF    Collection Time: 08/06/21  9:28 AM   Result Value Ref Range    WBC 4.8 3.6 - 11.0 K/uL    RBC 5.16 3.80 - 5.20 M/uL    HGB 15.6 11.5 - 16.0 g/dL    HCT 49.6 (H) 35.0 - 47.0 %    MCV 96.1 80.0 - 99.0 FL    MCH 30.2 26.0 - 34.0 PG    MCHC 31.5 30.0 - 36.5 g/dL    RDW 15.1 (H) 11.5 - 14.5 %    PLATELET 859 501 - 619 K/uL    MPV 9.8 8.9 - 12.9 FL    NRBC 0.0 0.0  WBC    ABSOLUTE NRBC 0.00 0.00 - 0.01 K/uL    NEUTROPHILS 41 32 - 75 %    LYMPHOCYTES 49 12 - 49 %    MONOCYTES 6 5 - 13 %    EOSINOPHILS 4 0 - 7 %    BASOPHILS 0 0 - 1 %    IMMATURE GRANULOCYTES 0 0 - 0.5 %    ABS. NEUTROPHILS 1.9 1.8 - 8.0 K/UL    ABS. LYMPHOCYTES 2.3 0.8 - 3.5 K/UL    ABS. MONOCYTES 0.3 0.0 - 1.0 K/UL    ABS. EOSINOPHILS 0.2 0.0 - 0.4 K/UL    ABS. BASOPHILS 0.0 0.0 - 0.1 K/UL    ABS. IMM. GRANS. 0.0 0.00 - 0.04 K/UL    DF AUTOMATED         Results     ** No results found for the last 336 hours.  **           Labs:     Recent Labs     08/06/21 0928   WBC 4.8   HGB 15.6   HCT 49.6*        Recent Labs     08/06/21  0928 08/05/21  0045   * 152*   K 3.2* Hemolyzed, recollect requested   * 120*   CO2 30 28   BUN 18 16   CREA 1.56* 1.42*   * 81   CA 9.1 9.1     Recent Labs     08/06/21 0928 08/05/21  0045   ALT 13 16   * 153*   TBILI 0.5 0.4   TP 8.0 7.6   ALB 3.5 3.1*   GLOB 4.5* 4.5*     No results for input(s): INR, PTP, APTT, INREXT, INREXT in the last 72 hours. No results for input(s): FE, TIBC, PSAT, FERR in the last 72 hours. No results found for: FOL, RBCF   No results for input(s): PH, PCO2, PO2 in the last 72 hours. No results for input(s): CPK, CKNDX, TROIQ in the last 72 hours. No lab exists for component: CPKMB  No results found for: CHOL, CHOLX, CHLST, CHOLV, HDL, HDLP, LDL, LDLC, DLDLP, TGLX, TRIGL, TRIGP, CHHD, CHHDX  Lab Results   Component Value Date/Time    Glucose (POC) 147 (H) 04/20/2021 08:23 PM    Glucose (POC) 102 (H) 04/20/2021 03:46 PM    Glucose (POC) 224 (H) 10/28/2020 11:42 AM    Glucose (POC) 106 (H) 10/28/2020 08:23 AM    Glucose (POC) 141 (H) 10/23/2020 07:23 AM     Lab Results   Component Value Date/Time    Color Yellow/Straw 08/05/2021 05:20 AM    Appearance Clear 08/05/2021 05:20 AM    Specific gravity 1.008 08/05/2021 05:20 AM    Specific gravity 1.012 11/30/2013 04:50 AM    pH (UA) 7.0 08/05/2021 05:20 AM    Protein Negative 08/05/2021 05:20 AM    Glucose Negative 08/05/2021 05:20 AM    Ketone Negative 08/05/2021 05:20 AM    Bilirubin Negative 08/05/2021 05:20 AM    Urobilinogen 0.1 08/05/2021 05:20 AM    Nitrites Negative 08/05/2021 05:20 AM    Leukocyte Esterase Negative 08/05/2021 05:20 AM    Epithelial cells FEW 11/30/2013 04:50 AM    Bacteria Negative 08/05/2021 05:20 AM    WBC 0-4 08/05/2021 05:20 AM    RBC 0-5 08/05/2021 05:20 AM         Assessment:     1. Hypernatremia  2. Seizure disorder  3. Leg edema  4. Diabetes insipidus  5. History of DVT  6. Acute kidney injury  7.   Dehydration      Ultrasound was normal    Plan:   Continue on D5 water at 75 cc/h  Seen by the nephrologist ordered to 250 cc/every 6 hours  Monitor sodium level and renal function  Neurology consult if further seizures occur     Continue Keppra for seizures  Seizures precaution  Fall precaution    Monitor renal function          Current Facility-Administered Medications:     amLODIPine (NORVASC) tablet 10 mg, 10 mg, Oral, DAILY, Asim, Laban Mcardle, MD, 10 mg at 08/06/21 0950    levETIRAcetam (KEPPRA) tablet 1,000 mg, 1,000 mg, Oral, BID, Mohiuddin, Laban Mcardle, MD, 1,000 mg at 08/06/21 0161    rivaroxaban (XARELTO) tablet 20 mg, 20 mg, Oral, DAILY WITH DINNER, Mohiuddin, Laban Mcardle, MD, 20 mg at 08/05/21 1802    calcium carbonate (TUMS) chewable tablet 1,000 mg [elemental], 1,000 mg, Oral, Q6H PRN, Mohiuddin, Laban Mcardle, MD    desmopressin (DDAVP) tablet 200 mcg, 0.2 mg, Oral, TID, Melecio Maciel MD, 200 mcg at 08/06/21 2313    lamoTRIgine (LaMICtal) tablet 100 mg, 100 mg, Oral, BID, Mohiuddin, Laban Mcardle, MD, 100 mg at 08/06/21 0950    acetaminophen (TYLENOL) tablet 650 mg, 650 mg, Oral, Q6H PRN, 650 mg at 08/05/21 2330 **OR** acetaminophen (TYLENOL) suppository 650 mg, 650 mg, Rectal, Q6H PRN, Mohiuddin, Laban Mcardle, MD    polyethylene glycol (MIRALAX) packet 17 g, 17 g, Oral, DAILY PRN, Mohiuddin, Laban Mcardle, MD    ondansetron (ZOFRAN ODT) tablet 4 mg, 4 mg, Oral, Q8H PRN **OR** ondansetron (ZOFRAN) injection 4 mg, 4 mg, IntraVENous, Q6H PRN, Ehsan Dailey MD    dextrose 5% infusion, 75 mL/hr, IntraVENous, CONTINUOUS, Ehsan Dailey MD, Last Rate: 75 mL/hr at 08/06/21 0955, 75 mL/hr at 08/06/21 0955    ferrous sulfate 300 mg (60 mg iron)/5 mL oral syrup 300 mg, 300 mg, Oral, BID WITH MEALS, Ehsan Dailey MD, 300 mg at 08/06/21 8973                                                                                              *ATTENTION:  This note has been created by a medical student for educational purposes only. Please do not refer to the content of this note for clinical decision-making, billing, or other purposes. Please see attending physicians note to obtain clinical information on this patient. *

## 2021-08-06 NOTE — PROGRESS NOTES
DC Plan: [address] ([facility] [address])    Cm will continue to follow.

## 2021-08-06 NOTE — MED STUDENT NOTES
General Daily Progress Note          Patient Name:   Leroy Nolan       YOB: 1950       Age:  66 y.o. Admit Date: 8/5/2021      Subjective:     Patient is a 66y.o. year old female with past medical history of a seizure disorder, diabetes insipidus, history of DVT presented to the ED yesterday morning at [facility] via EMS after possible seizure due to suspected hypernatremia. Patient is alert and oriented in her bed this morning. She is mildly confused as she believes she has been in the hospital for 12 months. She notes improvement in the twitching of her right eye since yesterday. She otherwise denies chest pain, shortness of breath, fever, chills, dysuria, trouble urinating, nausea, vomiting or constipation. Objective:     Visit Vitals  BP (!) 168/66 (BP 1 Location: Left arm, BP Patient Position: At rest)   Pulse 77   Temp 97.8 °F (36.6 °C)   Resp 18   Ht 5' 6\" (1.676 m)   Wt 145 lb (65.8 kg)   SpO2 97%   BMI 23.40 kg/m²        No results found for this or any previous visit (from the past 24 hour(s)). [unfilled]      Review of Systems    Constitutional: Negative for chills and fever. HENT: Negative. Eyes: Negative. Respiratory: Negative. Cardiovascular: Negative. Gastrointestinal: Negative for abdominal pain and nausea. Skin: Negative. Neurological: Negative. Physical Exam:      Constitutional: pt is oriented to person, place. She is confused at the time and her duration of stay in the hospital.  HENT:   Head: Normocephalic and atraumatic. Eyes: Pupils are equal, round, and reactive to light. EOM are normal.   Cardiovascular: Normal rate, regular rhythm and normal heart sounds. Pulmonary/Chest: Breath sounds normal. No wheezes. No rales. Exhibits no tenderness. Abdominal: Soft. Bowel sounds are normal. There is no abdominal tenderness. There is no rebound and no guarding. Musculoskeletal: Normal range of motion.    Neurological: pt is alert and oriented to person, place, and time. US RETROPERITONEUM COMP   Final Result   Normal exam              No results found for this or any previous visit (from the past 24 hour(s)). Results     ** No results found for the last 336 hours. **           Labs:   No results for input(s): WBC, HGB, HCT, PLT, HGBEXT, HCTEXT, PLTEXT in the last 72 hours. Recent Labs     08/05/21  0045   *   K Hemolyzed, recollect requested   *   CO2 28   BUN 16   CREA 1.42*   GLU 81   CA 9.1     Recent Labs     08/05/21  0045   ALT 16   *   TBILI 0.4   TP 7.6   ALB 3.1*   GLOB 4.5*     No results for input(s): INR, PTP, APTT, INREXT in the last 72 hours. No results for input(s): FE, TIBC, PSAT, FERR in the last 72 hours. No results found for: FOL, RBCF   No results for input(s): PH, PCO2, PO2 in the last 72 hours. No results for input(s): CPK, CKNDX, TROIQ in the last 72 hours.     No lab exists for component: CPKMB  No results found for: CHOL, CHOLX, CHLST, CHOLV, HDL, HDLP, LDL, LDLC, DLDLP, TGLX, TRIGL, TRIGP, CHHD, CHHDX  Lab Results   Component Value Date/Time    Glucose (POC) 147 (H) 04/20/2021 08:23 PM    Glucose (POC) 102 (H) 04/20/2021 03:46 PM    Glucose (POC) 224 (H) 10/28/2020 11:42 AM    Glucose (POC) 106 (H) 10/28/2020 08:23 AM    Glucose (POC) 141 (H) 10/23/2020 07:23 AM     Lab Results   Component Value Date/Time    Color Yellow/Straw 08/05/2021 05:20 AM    Appearance Clear 08/05/2021 05:20 AM    Specific gravity 1.008 08/05/2021 05:20 AM    Specific gravity 1.012 11/30/2013 04:50 AM    pH (UA) 7.0 08/05/2021 05:20 AM    Protein Negative 08/05/2021 05:20 AM    Glucose Negative 08/05/2021 05:20 AM    Ketone Negative 08/05/2021 05:20 AM    Bilirubin Negative 08/05/2021 05:20 AM    Urobilinogen 0.1 08/05/2021 05:20 AM    Nitrites Negative 08/05/2021 05:20 AM    Leukocyte Esterase Negative 08/05/2021 05:20 AM    Epithelial cells FEW 11/30/2013 04:50 AM    Bacteria Negative 08/05/2021 05:20 AM    WBC 0-4 08/05/2021 05:20 AM    RBC 0-5 08/05/2021 05:20 AM         Assessment:     1. Hypernatremia  2. Seizure disorder  3. Leg edema  4. Diabetes insipidus  5. History of DVT  6. Acute kidney injury  7.   Dehydration      Plan:   Continue on D5 water at 75 cc/h  Monitor sodium level and renal function  Nephrology consult if further seizures occur     Continue Keppra for seizures  Seizures precaution  Fall precaution  We will do bladder ultrasound for urinary retention          Current Facility-Administered Medications:     amLODIPine (NORVASC) tablet 10 mg, 10 mg, Oral, DAILY, Ehsan Dailey MD, 10 mg at 08/05/21 9012    levETIRAcetam (KEPPRA) tablet 1,000 mg, 1,000 mg, Oral, BID, Ehsan Dailey MD, 1,000 mg at 08/05/21 2331    rivaroxaban (XARELTO) tablet 20 mg, 20 mg, Oral, DAILY WITH DINNER, Ehsan Dailey MD, 20 mg at 08/05/21 1802    calcium carbonate (TUMS) chewable tablet 1,000 mg [elemental], 1,000 mg, Oral, Q6H PRN, Kong Dailey MD    desmopressin (DDAVP) tablet 200 mcg, 0.2 mg, Oral, TID, Kong Dailey MD, 200 mcg at 08/05/21 2330    lamoTRIgine (LaMICtal) tablet 100 mg, 100 mg, Oral, BID, Ehsan Dailey MD, 100 mg at 08/05/21 2330    acetaminophen (TYLENOL) tablet 650 mg, 650 mg, Oral, Q6H PRN, 650 mg at 08/05/21 2330 **OR** acetaminophen (TYLENOL) suppository 650 mg, 650 mg, Rectal, Q6H PRN, Kong Dailey MD    polyethylene glycol (MIRALAX) packet 17 g, 17 g, Oral, DAILY PRN, Kong Dailey MD    ondansetron (ZOFRAN ODT) tablet 4 mg, 4 mg, Oral, Q8H PRN **OR** ondansetron (ZOFRAN) injection 4 mg, 4 mg, IntraVENous, Q6H PRN, Ehsan Dailey MD    dextrose 5% infusion, 75 mL/hr, IntraVENous, CONTINUOUS, Ehsan Dailey MD, Last Rate: 75 mL/hr at 08/05/21 1117, 75 mL/hr at 08/05/21 1117    ferrous sulfate 300 mg (60 mg iron)/5 mL oral syrup 300 mg, 300 mg, Oral, BID WITH MEALS, Ehsan Dailey MD, 300 mg at 08/05/21 1662 *ATTENTION:  This note has been created by a medical student for educational purposes only. Please do not refer to the content of this note for clinical decision-making, billing, or other purposes. Please see attending physicians note to obtain clinical information on this patient. *

## 2021-08-06 NOTE — MED STUDENT NOTES
General Daily Progress Note          Patient Name:   Anne Garcia       YOB: 1950       Age:  66 y.o. Admit Date: 8/5/2021      Subjective:     Patient is a 66y.o. year old female with past medical history of a seizure disorder, diabetes insipidus, history of DVT presented to the ED yesterday morning at [address] [facility] via EMS after possible seizure due to suspected hypernatremia. Patient is alert and oriented in her bed this morning. She is mildly confused as she believes she has been in the hospital for 12 months. She notes improvement in the twitching of her right eye since yesterday. She otherwise denies chest pain, shortness of breath, fever, chills, dysuria, trouble urinating, nausea, vomiting or constipation. Objective:     Visit Vitals  BP (!) 148/93   Pulse 86   Temp 98 °F (36.7 °C)   Resp 18   Ht 5' 6\" (1.676 m)   Wt 65.8 kg (145 lb)   SpO2 98%   BMI 23.40 kg/m²        No results found for this or any previous visit (from the past 24 hour(s)). [unfilled]      Review of Systems    Constitutional: Negative for chills and fever. HENT: Negative. Eyes: Negative. Respiratory: Negative. Cardiovascular: Negative. Gastrointestinal: Negative for abdominal pain and nausea. Skin: Negative. Neurological: Negative. Physical Exam:      Constitutional: pt is oriented to person, place. She is confused at the time and her duration of stay in the hospital.  HENT:   Head: Normocephalic and atraumatic. Eyes: Pupils are equal, round, and reactive to light. EOM are normal.   Cardiovascular: Normal rate, regular rhythm and normal heart sounds. Pulmonary/Chest: Breath sounds normal. No wheezes. No rales. Exhibits no tenderness. Abdominal: Soft. Bowel sounds are normal. There is no abdominal tenderness. There is no rebound and no guarding. Musculoskeletal: Normal range of motion. Neurological: pt is alert and oriented to person, place, and time.      [place] 6 COMP   Final Result   Normal exam              No results found for this or any previous visit (from the past 24 hour(s)). Results     ** No results found for the last 336 hours. **           Labs:   No results for input(s): WBC, HGB, HCT, PLT, HGBEXT, HCTEXT, PLTEXT, HGBEXT, HCTEXT, PLTEXT in the last 72 hours. Recent Labs     08/05/21 0045   *   K Hemolyzed, recollect requested   *   CO2 28   BUN 16   CREA 1.42*   GLU 81   CA 9.1     Recent Labs     08/05/21  0045   ALT 16   *   TBILI 0.4   TP 7.6   ALB 3.1*   GLOB 4.5*     No results for input(s): INR, PTP, APTT, INREXT, INREXT in the last 72 hours. No results for input(s): FE, TIBC, PSAT, FERR in the last 72 hours. No results found for: FOL, RBCF   No results for input(s): PH, PCO2, PO2 in the last 72 hours. No results for input(s): CPK, CKNDX, TROIQ in the last 72 hours.     No lab exists for component: CPKMB  No results found for: CHOL, CHOLX, CHLST, CHOLV, HDL, HDLP, LDL, LDLC, DLDLP, TGLX, TRIGL, TRIGP, CHHD, CHHDX  Lab Results   Component Value Date/Time    Glucose (POC) 147 (H) 04/20/2021 08:23 PM    Glucose (POC) 102 (H) 04/20/2021 03:46 PM    Glucose (POC) 224 (H) 10/28/2020 11:42 AM    Glucose (POC) 106 (H) 10/28/2020 08:23 AM    Glucose (POC) 141 (H) 10/23/2020 07:23 AM     Lab Results   Component Value Date/Time    Color Yellow/Straw 08/05/2021 05:20 AM    Appearance Clear 08/05/2021 05:20 AM    Specific gravity 1.008 08/05/2021 05:20 AM    Specific gravity 1.012 11/30/2013 04:50 AM    pH (UA) 7.0 08/05/2021 05:20 AM    Protein Negative 08/05/2021 05:20 AM    Glucose Negative 08/05/2021 05:20 AM    Ketone Negative 08/05/2021 05:20 AM    Bilirubin Negative 08/05/2021 05:20 AM    Urobilinogen 0.1 08/05/2021 05:20 AM    Nitrites Negative 08/05/2021 05:20 AM    Leukocyte Esterase Negative 08/05/2021 05:20 AM    Epithelial cells FEW 11/30/2013 04:50 AM    Bacteria Negative 08/05/2021 05:20 AM    WBC 0-4 08/05/2021 05:20 AM    RBC 0-5 08/05/2021 05:20 AM         Assessment:     1. Hypernatremia  2. Seizure disorder  3. Leg edema  4. Diabetes insipidus  5. History of DVT  6. Acute kidney injury  7.   Dehydration      Ultrasound of the abdomen and bladder normal    Plan:   Continue on D5 water at 75 cc/h  Monitor sodium level and renal function  Nephrology consult if further seizures occur     Continue Keppra for seizures  Seizures precaution  Fall precaution    Today's labs are pending          Current Facility-Administered Medications:     amLODIPine (NORVASC) tablet 10 mg, 10 mg, Oral, DAILY, Ehsan Dailey MD, 10 mg at 08/05/21 3237    levETIRAcetam (KEPPRA) tablet 1,000 mg, 1,000 mg, Oral, BID, Ehsan Dailey MD, 1,000 mg at 08/05/21 2331    rivaroxaban (XARELTO) tablet 20 mg, 20 mg, Oral, DAILY WITH DINNER, Ehsan Dailey MD, 20 mg at 08/05/21 1802    calcium carbonate (TUMS) chewable tablet 1,000 mg [elemental], 1,000 mg, Oral, Q6H PRN, Colin Dailey MD    desmopressin (DDAVP) tablet 200 mcg, 0.2 mg, Oral, TID, Colin Dailey MD, 200 mcg at 08/05/21 2330    lamoTRIgine (LaMICtal) tablet 100 mg, 100 mg, Oral, BID, Ehsan Dailey MD, 100 mg at 08/05/21 2330    acetaminophen (TYLENOL) tablet 650 mg, 650 mg, Oral, Q6H PRN, 650 mg at 08/05/21 2330 **OR** acetaminophen (TYLENOL) suppository 650 mg, 650 mg, Rectal, Q6H PRN, Colin Dailey MD    polyethylene glycol (MIRALAX) packet 17 g, 17 g, Oral, DAILY PRN, Colin Dailey MD    ondansetron (ZOFRAN ODT) tablet 4 mg, 4 mg, Oral, Q8H PRN **OR** ondansetron (ZOFRAN) injection 4 mg, 4 mg, IntraVENous, Q6H PRN, Ehsan Dailey MD    dextrose 5% infusion, 75 mL/hr, IntraVENous, CONTINUOUS, Ehsan Dailey MD, Last Rate: 75 mL/hr at 08/05/21 1117, 75 mL/hr at 08/05/21 1117    ferrous sulfate 300 mg (60 mg iron)/5 mL oral syrup 300 mg, 300 mg, Oral, BID WITH MEALS, Ehsan Dailey MD, 300 mg at 08/05/21 5779 *ATTENTION:  This note has been created by a medical student for educational purposes only. Please do not refer to the content of this note for clinical decision-making, billing, or other purposes. Please see attending physicians note to obtain clinical information on this patient. *

## 2021-08-06 NOTE — PROGRESS NOTES
Name: Germaine Feldman MRN: [id number]   : 1950 Hospital: [address] [place] [address]   Date: 2021        IMPRESSION:   · Hypernatremia from partial central DI - now on dextrose IV and DDAVP. Na is trending down  · ANTONIA - unclear etiology. Scr is slightly up  · Hypokalemia - urine losses. PLAN:   · Replace K and check the Mg  · Repeat renal panel in AM. May need to increase the rate of IVFs     Subjective/Interval History:   I have reviewed the flowsheet and previous days notes. ROS:A comprehensive review of systems was negative. Objective:   Vital Signs:    Visit Vitals  BP (!) 148/93   Pulse 86   Temp 98 °F (36.7 °C)   Resp 18   Ht 5' 6\" (1.676 m)   Wt 65.8 kg (145 lb)   SpO2 98%   BMI 23.40 kg/m²       O2 Device: None (Room air)       Temp (24hrs), Av.9 °F (36.6 °C), Min:97.8 °F (36.6 °C), Max:98 °F (36.7 °C)       Intake/Output:   Last shift:      No intake/output data recorded. Last 3 shifts: No intake/output data recorded. No intake or output data in the 24 hours ending 21 1440     Physical Exam:  General:    Alert, cooperative, no distress, disoriented. Head:   Normocephalic, without obvious abnormality, atraumatic. Eyes:   Conjunctivae/corneas clear. Nose:  Nares normal. No drainage or sinus tenderness. Throat:    Lips, mucosa, and tongue normal.  No Thrush  Neck:  Supple, symmetrical,  no adenopathy, thyroid: non tender    no carotid bruit and no JVD. Lungs:   Clear to auscultation bilaterally. No Wheezing or Rhonchi. No rales. Chest wall:  No tenderness or deformity. No Accessory muscle use. Heart:   Regular rate and rhythm,  no murmur, rub or gallop. Abdomen:   Soft, non-tender. Not distended. Bowel sounds normal. No masses  Extremities: Extremities normal, atraumatic, No cyanosis. No edema. No clubbing  Skin:     Texture, turgor normal. No rashes or lesions. Not Jaundiced  Psych:  Fair insight. Not depressed.   Not anxious or agitated. DATA:  Labs:  Recent Labs     08/06/21  0928 08/05/21  0045   * 152*   K 3.2* Hemolyzed, recollect requested   * 120*   CO2 30 28   BUN 18 16   CREA 1.56* 1.42*   CA 9.1 9.1   ALB 3.5 3.1*     Recent Labs     08/06/21  0928   WBC 4.8   HGB 15.6   HCT 49.6*        No results for input(s): JOSE RAUL, KU, CLU, CREAU in the last 72 hours.     No lab exists for component: PROU    Total time spent with patient:  35 minutes    [] Critical Care Provided    Care Plan discussed with:   Staff, Medical Team    Zo Boucher MD

## 2021-08-06 NOTE — CONSULTS
Comprehensive Nutrition Assessment    Type and Reason for Visit: Initial, Consult (?poor PO (per consult))    Nutrition Recommendations/Plan:     Continue Reg diet  Maintain flushes via PEG per Nephrology recs    Discontinue supplements per pt request  Add Sherbet daily  Allow additional snacks as desired    Obtain updated measured wt  Consider addition of MVI    Document PO intakes, PEG flushes, BMs in I/Os      Nutrition Assessment:  Admitted for abnormal labs, possible seizure, bladder distention. Nephrology following, initiated bolus water via PEG. Known PEG utilized for meds and flushes, however pt able to tolerate reg texture. Ordered Reg diet, no intakes yet recorded. RD consulted for supplements, added yesterday. Pt remains with intermittent confusion however participated well in interview today. Eating lunch ( salad) however dislikes \"too much lettuce\". Dislikes Ensure, refused. Refused Magic cup or pdg. Only agreeable to sherbet, will add. Denied acute nutritional concerns. Labs: Na 149, K 3.2, Cl 113, Cr 1.56, , AST 9, Alk Phos 158. Meds: D5 75mL/hr, keppra, lamotrigine, FeSu, TUMS, miralax PRN. Malnutrition Assessment:  Malnutrition Status:  No malnutrition    Context:  Acute illness     Findings of the 6 clinical characteristics of malnutrition:   Energy Intake:  No significant decrease in energy intake  Weight Loss:  No significant weight loss     Body Fat Loss:  No significant body fat loss,     Muscle Mass Loss:  No significant muscle mass loss,    Fluid Accumulation:  No significant fluid accumulation,        Nutrition Related Findings:  NFPE unremarkable. Pt denied c/s difficulty. PEG to abd for fluids, meds. Tolerates reg texture diet at baseline. No n/v or c/d. BM this AM per pt. No edema.       Wounds:    None       Current Nutrition Therapies:  ADULT ORAL NUTRITION SUPPLEMENT Breakfast, Lunch, Dinner; Standard High Calorie/High Protein  ADULT ORAL NUTRITION SUPPLEMENT Lunch, Dinner; Fortified Pudding  ADULT DIET Regular    Anthropometric Measures:  · Height:  5' 6\" (167.6 cm)  · Current Body Wt:  65.8 kg (145 lb 1 oz) (estimated)   · Usual Body Wt:  63.5 kg (140 lb) (140-145 per pt)     · Ideal Body Wt:  130 lbs:  111.6 %   · BMI Category:  Normal weight (BMI 22.0-24.9) age over 72     Wt Readings from Last 5 Encounters:   08/05/21 65.8 kg (145 lb)   04/19/21 59 kg (130 lb)   01/13/21 59 kg (130 lb)   11/26/13 51.8 kg (114 lb 3.2 oz)   Pt reports gradual wt gain.     Nutrition Diagnosis:   No nutrition diagnosis at this time     Nutrition Interventions:   Food and/or Nutrient Delivery: Continue current diet, Discontinue oral nutrition supplement, Snacks (specify)  Nutrition Education and Counseling: No recommendations at this time  Coordination of Nutrition Care: Continue to monitor while inpatient    Goals:  Intakes >65% x 7 days, Wt maintenance +/- 1kg, Lytes WNL       Nutrition Monitoring and Evaluation:   Behavioral-Environmental Outcomes: None identified  Food/Nutrient Intake Outcomes: Food and nutrient intake  Physical Signs/Symptoms Outcomes: GI status, Weight, Biochemical data, Chewing or swallowing    Discharge Planning:    No discharge needs at this time     Electronically signed by Ronnie Lara on 8/6/2021 at 1:31 PM    Contact: EXT [phone] or via [employer]

## 2021-08-06 NOTE — PROGRESS NOTES
Patient has been seizure free during the shift; tolerated all medications via peg tube with no residual noted prior to medication administration. Patient has not allowed writer to hook up IV nor has she allowed me to place another one in a different site. PO fluids have been encouraged and administered during the shift a total of 480cc of PO fluids. Several lab techs have been in trying to get her lab work done and she continues to refuse; MD notified.

## 2021-08-06 NOTE — PROGRESS NOTES
Problem: Pressure Injury - Risk of  Goal: *Prevention of pressure injury  Description: Document Lexx Scale and appropriate interventions in the flowsheet. Outcome: Progressing Towards Goal  Note: Pressure Injury Interventions:  Sensory Interventions: Assess changes in LOC    Moisture Interventions: Absorbent underpads    Activity Interventions: Chair cushion    Mobility Interventions: Assess need for specialty bed    Nutrition Interventions: Document food/fluid/supplement intake                     Problem: Patient Education: Go to Patient Education Activity  Goal: Patient/Family Education  Outcome: Progressing Towards Goal     Problem: Falls - Risk of  Goal: *Absence of Falls  Description: Document [facility] Fall Risk and appropriate interventions in the flowsheet.   Outcome: Progressing Towards Goal  Note: Fall Risk Interventions:  Mobility Interventions: Bed/chair exit alarm    Mentation Interventions: Bed/chair exit alarm    Medication Interventions: Bed/chair exit alarm    Elimination Interventions: Bed/chair exit alarm              Problem: Patient Education: Go to Patient Education Activity  Goal: Patient/Family Education  Outcome: Progressing Towards Goal     Problem: Patient Education: Go to Patient Education Activity  Goal: Patient/Family Education  Outcome: Progressing Towards Goal

## 2021-08-07 LAB
ALBUMIN SERPL-MCNC: 2.9 G/DL (ref 3.5–5)
ALBUMIN SERPL-MCNC: 3 G/DL (ref 3.5–5)
ALBUMIN/GLOB SERPL: 0.8 {RATIO} (ref 1.1–2.2)
ALP SERPL-CCNC: 132 U/L (ref 45–117)
ALT SERPL-CCNC: 11 U/L (ref 12–78)
ANION GAP SERPL CALC-SCNC: 5 MMOL/L (ref 5–15)
ANION GAP SERPL CALC-SCNC: 7 MMOL/L (ref 5–15)
AST SERPL W P-5'-P-CCNC: 10 U/L (ref 15–37)
BASOPHILS # BLD: 0 K/UL (ref 0–0.1)
BASOPHILS NFR BLD: 1 % (ref 0–1)
BILIRUB SERPL-MCNC: 0.5 MG/DL (ref 0.2–1)
BUN SERPL-MCNC: 14 MG/DL (ref 6–20)
BUN SERPL-MCNC: 14 MG/DL (ref 6–20)
BUN/CREAT SERPL: 11 (ref 12–20)
BUN/CREAT SERPL: 13 (ref 12–20)
CA-I BLD-MCNC: 8.1 MG/DL (ref 8.5–10.1)
CA-I BLD-MCNC: 8.2 MG/DL (ref 8.5–10.1)
CHLORIDE SERPL-SCNC: 106 MMOL/L (ref 97–108)
CHLORIDE SERPL-SCNC: 108 MMOL/L (ref 97–108)
CO2 SERPL-SCNC: 25 MMOL/L (ref 21–32)
CO2 SERPL-SCNC: 27 MMOL/L (ref 21–32)
CREAT SERPL-MCNC: 1.12 MG/DL (ref 0.55–1.02)
CREAT SERPL-MCNC: 1.23 MG/DL (ref 0.55–1.02)
DIFFERENTIAL METHOD BLD: ABNORMAL
EOSINOPHIL # BLD: 0.2 K/UL (ref 0–0.4)
EOSINOPHIL NFR BLD: 4 % (ref 0–7)
ERYTHROCYTE [DISTWIDTH] IN BLOOD BY AUTOMATED COUNT: 14.7 % (ref 11.5–14.5)
GLOBULIN SER CALC-MCNC: 3.9 G/DL (ref 2–4)
GLUCOSE SERPL-MCNC: 125 MG/DL (ref 65–100)
GLUCOSE SERPL-MCNC: 131 MG/DL (ref 65–100)
HCT VFR BLD AUTO: 43.7 % (ref 35–47)
HGB BLD-MCNC: 14 G/DL (ref 11.5–16)
IMM GRANULOCYTES # BLD AUTO: 0 K/UL (ref 0–0.04)
IMM GRANULOCYTES NFR BLD AUTO: 0 % (ref 0–0.5)
LYMPHOCYTES # BLD: 1.9 K/UL (ref 0.8–3.5)
LYMPHOCYTES NFR BLD: 41 % (ref 12–49)
MAGNESIUM SERPL-MCNC: 1.9 MG/DL (ref 1.6–2.4)
MCH RBC QN AUTO: 29.9 PG (ref 26–34)
MCHC RBC AUTO-ENTMCNC: 32 G/DL (ref 30–36.5)
MCV RBC AUTO: 93.4 FL (ref 80–99)
MONOCYTES # BLD: 0.5 K/UL (ref 0–1)
MONOCYTES NFR BLD: 10 % (ref 5–13)
NEUTS SEG # BLD: 2 K/UL (ref 1.8–8)
NEUTS SEG NFR BLD: 44 % (ref 32–75)
NRBC # BLD: 0 K/UL (ref 0–0.01)
NRBC BLD-RTO: 0 PER 100 WBC
PHOSPHATE SERPL-MCNC: 2.4 MG/DL (ref 2.6–4.7)
PLATELET # BLD AUTO: 181 K/UL (ref 150–400)
PMV BLD AUTO: 10.2 FL (ref 8.9–12.9)
POTASSIUM SERPL-SCNC: 3.5 MMOL/L (ref 3.5–5.1)
POTASSIUM SERPL-SCNC: 3.5 MMOL/L (ref 3.5–5.1)
PROT SERPL-MCNC: 6.9 G/DL (ref 6.4–8.2)
RBC # BLD AUTO: 4.68 M/UL (ref 3.8–5.2)
SODIUM SERPL-SCNC: 138 MMOL/L (ref 136–145)
SODIUM SERPL-SCNC: 140 MMOL/L (ref 136–145)
WBC # BLD AUTO: 4.5 K/UL (ref 3.6–11)

## 2021-08-07 PROCEDURE — 36415 COLL VENOUS BLD VENIPUNCTURE: CPT

## 2021-08-07 PROCEDURE — 83735 ASSAY OF MAGNESIUM: CPT

## 2021-08-07 PROCEDURE — 85025 COMPLETE CBC W/AUTO DIFF WBC: CPT

## 2021-08-07 PROCEDURE — 74011250637 HC RX REV CODE- 250/637: Performed by: INTERNAL MEDICINE

## 2021-08-07 PROCEDURE — 80069 RENAL FUNCTION PANEL: CPT

## 2021-08-07 PROCEDURE — 74011250636 HC RX REV CODE- 250/636: Performed by: FAMILY MEDICINE

## 2021-08-07 PROCEDURE — 80053 COMPREHEN METABOLIC PANEL: CPT

## 2021-08-07 PROCEDURE — 65660000001 HC RM ICU INTERMED STEPDOWN

## 2021-08-07 PROCEDURE — 74011250637 HC RX REV CODE- 250/637: Performed by: FAMILY MEDICINE

## 2021-08-07 RX ADMIN — LEVETIRACETAM 1000 MG: 500 TABLET ORAL at 08:46

## 2021-08-07 RX ADMIN — MINERAL SUPPLEMENT IRON 300 MG / 5 ML STRENGTH LIQUID 100 PER BOX UNFLAVORED 300 MG: at 16:01

## 2021-08-07 RX ADMIN — MINERAL SUPPLEMENT IRON 300 MG / 5 ML STRENGTH LIQUID 100 PER BOX UNFLAVORED 300 MG: at 08:48

## 2021-08-07 RX ADMIN — AMLODIPINE BESYLATE 10 MG: 5 TABLET ORAL at 08:47

## 2021-08-07 RX ADMIN — LAMOTRIGINE 100 MG: 100 TABLET ORAL at 08:47

## 2021-08-07 RX ADMIN — DESMOPRESSIN ACETATE 200 MCG: 0.2 TABLET ORAL at 08:49

## 2021-08-07 RX ADMIN — DEXTROSE MONOHYDRATE 75 ML/HR: 50 INJECTION, SOLUTION INTRAVENOUS at 12:25

## 2021-08-07 RX ADMIN — RIVAROXABAN 20 MG: 20 TABLET, FILM COATED ORAL at 16:01

## 2021-08-07 RX ADMIN — POTASSIUM CHLORIDE 40 MEQ: 1.5 FOR SOLUTION ORAL at 08:46

## 2021-08-07 RX ADMIN — DESMOPRESSIN ACETATE 200 MCG: 0.2 TABLET ORAL at 16:01

## 2021-08-07 NOTE — ROUTINE PROCESS
Bedside and Verbal shift change report given to Adilia Donald (oncoming nurse) by Alfredo Ayon (offgoing nurse). Report included the following information SBAR and MAR.

## 2021-08-07 NOTE — PROGRESS NOTES
Progress Note    Patient: Vida Lucas MRN: [id number]  SSN: [id number]    YOB: 1950  Age: 66 y.o. Sex: female      Admit Date: 8/5/2021    LOS: 2 days     Subjective:     65-year-old patient admitted for diabetes insipidus, hypernatremia. Patient is alert feels better    Objective:     Vitals:    08/06/21 2033 08/07/21 0400 08/07/21 0847 08/07/21 1520   BP: (!) 146/78  (!) 148/86 (!) 149/87   Pulse: 73 73 72 76   Resp: 18  18 18   Temp: 98.1 °F (36.7 °C)  98.4 °F (36.9 °C) 98.2 °F (36.8 °C)   SpO2: 96%  96% 96%   Weight:       Height:            Intake and Output:  Current Shift: No intake/output data recorded. Last three shifts: No intake/output data recorded. Physical Exam:   General:  Alert, cooperative, no distress, appears stated age. Eyes:  Conjunctivae/corneas clear. PERRL, EOMs intact. Fundi benign   Ears:  Normal TMs and external ear canals both ears. Nose: Nares normal. Septum midline. Mucosa normal. No drainage or sinus tenderness. Mouth/Throat: Lips, mucosa, and tongue normal. Teeth and gums normal.   Neck: Supple, symmetrical, trachea midline, no adenopathy, thyroid: no enlargment/tenderness/nodules, no carotid bruit and no JVD. Back:   Symmetric, no curvature. ROM normal. No CVA tenderness. Lungs:   Clear to auscultation bilaterally. Heart:  Regular rate and rhythm, S1, S2 normal, no murmur, click, rub or gallop. Abdomen:   Soft, non-tender. Bowel sounds normal. No masses,  No organomegaly. Extremities: Extremities normal, atraumatic, no cyanosis or edema. Pulses: 2+ and symmetric all extremities. Skin: Skin color, texture, turgor normal. No rashes or lesions   Lymph nodes: Cervical, supraclavicular, and axillary nodes normal.   Neurologic: CNII-XII intact. Normal strength, sensation and reflexes throughout. Lab/Data Review: All lab results for the last 24 hours reviewed.      Recent Results (from the past 24 hour(s))   CBC WITH AUTOMATED DIFF Collection Time: 08/07/21 11:51 AM   Result Value Ref Range    WBC 4.5 3.6 - 11.0 K/uL    RBC 4.68 3.80 - 5.20 M/uL    HGB 14.0 11.5 - 16.0 g/dL    HCT 43.7 35.0 - 47.0 %    MCV 93.4 80.0 - 99.0 FL    MCH 29.9 26.0 - 34.0 PG    MCHC 32.0 30.0 - 36.5 g/dL    RDW 14.7 (H) 11.5 - 14.5 %    PLATELET 414 043 - 912 K/uL    MPV 10.2 8.9 - 12.9 FL    NRBC 0.0 0.0  WBC    ABSOLUTE NRBC 0.00 0.00 - 0.01 K/uL    NEUTROPHILS 44 32 - 75 %    LYMPHOCYTES 41 12 - 49 %    MONOCYTES 10 5 - 13 %    EOSINOPHILS 4 0 - 7 %    BASOPHILS 1 0 - 1 %    IMMATURE GRANULOCYTES 0 0 - 0.5 %    ABS. NEUTROPHILS 2.0 1.8 - 8.0 K/UL    ABS. LYMPHOCYTES 1.9 0.8 - 3.5 K/UL    ABS. MONOCYTES 0.5 0.0 - 1.0 K/UL    ABS. EOSINOPHILS 0.2 0.0 - 0.4 K/UL    ABS. BASOPHILS 0.0 0.0 - 0.1 K/UL    ABS. IMM. GRANS. 0.0 0.00 - 0.04 K/UL    DF AUTOMATED     METABOLIC PANEL, COMPREHENSIVE    Collection Time: 08/07/21 11:51 AM   Result Value Ref Range    Sodium 140 136 - 145 mmol/L    Potassium 3.5 3.5 - 5.1 mmol/L    Chloride 108 97 - 108 mmol/L    CO2 27 21 - 32 mmol/L    Anion gap 5 5 - 15 mmol/L    Glucose 131 (H) 65 - 100 mg/dL    BUN 14 6 - 20 mg/dL    Creatinine 1.23 (H) 0.55 - 1.02 mg/dL    BUN/Creatinine ratio 11 (L) 12 - 20      GFR est AA 52 (L) >60 ml/min/1.73m2    GFR est non-AA 43 (L) >60 ml/min/1.73m2    Calcium 8.2 (L) 8.5 - 10.1 mg/dL    Bilirubin, total 0.5 0.2 - 1.0 mg/dL    AST (SGOT) 10 (L) 15 - 37 U/L    ALT (SGPT) 11 (L) 12 - 78 U/L    Alk.  phosphatase 132 (H) 45 - 117 U/L    Protein, total 6.9 6.4 - 8.2 g/dL    Albumin 3.0 (L) 3.5 - 5.0 g/dL    Globulin 3.9 2.0 - 4.0 g/dL    A-G Ratio 0.8 (L) 1.1 - 2.2     RENAL FUNCTION PANEL    Collection Time: 08/07/21 11:51 AM   Result Value Ref Range    Sodium 138 136 - 145 mmol/L    Potassium 3.5 3.5 - 5.1 mmol/L    Chloride 106 97 - 108 mmol/L    CO2 25 21 - 32 mmol/L    Anion gap 7 5 - 15 mmol/L    Glucose 125 (H) 65 - 100 mg/dL    BUN 14 6 - 20 mg/dL    Creatinine 1.12 (H) 0.55 - 1.02 mg/dL BUN/Creatinine ratio 13 12 - 20      GFR est AA 58 (L) >60 ml/min/1.73m2    GFR est non-AA 48 (L) >60 ml/min/1.73m2    Calcium 8.1 (L) 8.5 - 10.1 mg/dL    Phosphorus 2.4 (L) 2.6 - 4.7 mg/dL    Albumin 2.9 (L) 3.5 - 5.0 g/dL   MAGNESIUM    Collection Time: 08/07/21 11:51 AM   Result Value Ref Range    Magnesium 1.9 1.6 - 2.4 mg/dL         Assessment:     Active Problems:    Hypernatremia (10/17/2020)      Seizure ([address] (8/5/2021)      Diabetes insipidus  Plan:     Continue present treatment    Signed By: Ariadna Lee MD     August 7, 2021

## 2021-08-07 NOTE — PROGRESS NOTES
Bedside shift report given to Corazon Juarez RN oncoming nurse to include sbar.  Mar, kardex, recent orders and changes;

## 2021-08-08 ENCOUNTER — APPOINTMENT (OUTPATIENT)
Dept: MRI IMAGING | Age: 71
DRG: 644 | End: 2021-08-08
Attending: PSYCHIATRY & NEUROLOGY
Payer: MEDICARE

## 2021-08-08 LAB
ABO + RH BLD: NORMAL
ALBUMIN SERPL-MCNC: 3.3 G/DL (ref 3.5–5)
ANION GAP SERPL CALC-SCNC: 6 MMOL/L (ref 5–15)
BASOPHILS # BLD: 0 K/UL (ref 0–0.1)
BASOPHILS NFR BLD: 1 % (ref 0–1)
BLOOD GROUP ANTIBODIES SERPL: NEGATIVE
BUN SERPL-MCNC: 13 MG/DL (ref 6–20)
BUN/CREAT SERPL: 11 (ref 12–20)
CA-I BLD-MCNC: 8.5 MG/DL (ref 8.5–10.1)
CHLORIDE SERPL-SCNC: 99 MMOL/L (ref 97–108)
CO2 SERPL-SCNC: 24 MMOL/L (ref 21–32)
CREAT SERPL-MCNC: 1.19 MG/DL (ref 0.55–1.02)
DIFFERENTIAL METHOD BLD: ABNORMAL
EOSINOPHIL # BLD: 0.1 K/UL (ref 0–0.4)
EOSINOPHIL NFR BLD: 3 % (ref 0–7)
ERYTHROCYTE [DISTWIDTH] IN BLOOD BY AUTOMATED COUNT: 14.2 % (ref 11.5–14.5)
GLUCOSE BLD STRIP.AUTO-MCNC: 104 MG/DL (ref 65–117)
GLUCOSE SERPL-MCNC: 110 MG/DL (ref 65–100)
HCT VFR BLD AUTO: 44 % (ref 35–47)
HCT VFR BLD AUTO: 48.3 % (ref 35–47)
HGB BLD-MCNC: 14.3 G/DL (ref 11.5–16)
HGB BLD-MCNC: 15.4 G/DL (ref 11.5–16)
IMM GRANULOCYTES # BLD AUTO: 0 K/UL (ref 0–0.04)
IMM GRANULOCYTES NFR BLD AUTO: 0 % (ref 0–0.5)
INR PPP: 1.1 (ref 0.9–1.1)
LYMPHOCYTES # BLD: 1.6 K/UL (ref 0.8–3.5)
LYMPHOCYTES NFR BLD: 36 % (ref 12–49)
MCH RBC QN AUTO: 30 PG (ref 26–34)
MCHC RBC AUTO-ENTMCNC: 31.9 G/DL (ref 30–36.5)
MCV RBC AUTO: 94.2 FL (ref 80–99)
MONOCYTES # BLD: 0.5 K/UL (ref 0–1)
MONOCYTES NFR BLD: 11 % (ref 5–13)
NEUTS SEG # BLD: 2.2 K/UL (ref 1.8–8)
NEUTS SEG NFR BLD: 49 % (ref 32–75)
NRBC # BLD: 0 K/UL (ref 0–0.01)
NRBC BLD-RTO: 0 PER 100 WBC
PERFORMED BY, TECHID: NORMAL
PHOSPHATE SERPL-MCNC: 2.5 MG/DL (ref 2.6–4.7)
PLATELET # BLD AUTO: 151 K/UL (ref 150–400)
PMV BLD AUTO: 9.9 FL (ref 8.9–12.9)
POTASSIUM SERPL-SCNC: 4.1 MMOL/L (ref 3.5–5.1)
PROTHROMBIN TIME: 14.4 SEC (ref 11.9–14.7)
RBC # BLD AUTO: 5.13 M/UL (ref 3.8–5.2)
SODIUM SERPL-SCNC: 129 MMOL/L (ref 136–145)
SPECIMEN EXP DATE BLD: NORMAL
WBC # BLD AUTO: 4.5 K/UL (ref 3.6–11)

## 2021-08-08 PROCEDURE — 84146 ASSAY OF PROLACTIN: CPT

## 2021-08-08 PROCEDURE — 86901 BLOOD TYPING SEROLOGIC RH(D): CPT

## 2021-08-08 PROCEDURE — 74011000258 HC RX REV CODE- 258: Performed by: INTERNAL MEDICINE

## 2021-08-08 PROCEDURE — 65660000001 HC RM ICU INTERMED STEPDOWN

## 2021-08-08 PROCEDURE — 97530 THERAPEUTIC ACTIVITIES: CPT

## 2021-08-08 PROCEDURE — 36430 TRANSFUSION BLD/BLD COMPNT: CPT

## 2021-08-08 PROCEDURE — C9113 INJ PANTOPRAZOLE SODIUM, VIA: HCPCS | Performed by: INTERNAL MEDICINE

## 2021-08-08 PROCEDURE — 85025 COMPLETE CBC W/AUTO DIFF WBC: CPT

## 2021-08-08 PROCEDURE — 85610 PROTHROMBIN TIME: CPT

## 2021-08-08 PROCEDURE — 36415 COLL VENOUS BLD VENIPUNCTURE: CPT

## 2021-08-08 PROCEDURE — 74011250637 HC RX REV CODE- 250/637: Performed by: FAMILY MEDICINE

## 2021-08-08 PROCEDURE — 82962 GLUCOSE BLOOD TEST: CPT

## 2021-08-08 PROCEDURE — 74011250636 HC RX REV CODE- 250/636: Performed by: FAMILY MEDICINE

## 2021-08-08 PROCEDURE — 74011250637 HC RX REV CODE- 250/637: Performed by: INTERNAL MEDICINE

## 2021-08-08 PROCEDURE — 97110 THERAPEUTIC EXERCISES: CPT

## 2021-08-08 PROCEDURE — 97165 OT EVAL LOW COMPLEX 30 MIN: CPT

## 2021-08-08 PROCEDURE — P9059 PLASMA, FRZ BETWEEN 8-24HOUR: HCPCS

## 2021-08-08 PROCEDURE — 94760 N-INVAS EAR/PLS OXIMETRY 1: CPT

## 2021-08-08 PROCEDURE — 80177 DRUG SCRN QUAN LEVETIRACETAM: CPT

## 2021-08-08 PROCEDURE — 74011250636 HC RX REV CODE- 250/636

## 2021-08-08 PROCEDURE — 85018 HEMOGLOBIN: CPT

## 2021-08-08 PROCEDURE — 74011250636 HC RX REV CODE- 250/636: Performed by: INTERNAL MEDICINE

## 2021-08-08 PROCEDURE — 97116 GAIT TRAINING THERAPY: CPT

## 2021-08-08 PROCEDURE — 80069 RENAL FUNCTION PANEL: CPT

## 2021-08-08 PROCEDURE — 74011000250 HC RX REV CODE- 250: Performed by: INTERNAL MEDICINE

## 2021-08-08 RX ORDER — LEVETIRACETAM 10 MG/ML
1 INJECTION INTRAVASCULAR EVERY 12 HOURS
Status: DISCONTINUED | OUTPATIENT
Start: 2021-08-08 | End: 2021-08-08

## 2021-08-08 RX ORDER — LORAZEPAM 2 MG/ML
INJECTION INTRAMUSCULAR
Status: COMPLETED
Start: 2021-08-08 | End: 2021-08-08

## 2021-08-08 RX ORDER — LORAZEPAM 2 MG/ML
1 INJECTION INTRAMUSCULAR
Status: DISCONTINUED | OUTPATIENT
Start: 2021-08-08 | End: 2021-08-09 | Stop reason: HOSPADM

## 2021-08-08 RX ORDER — SODIUM CHLORIDE 9 MG/ML
250 INJECTION, SOLUTION INTRAVENOUS AS NEEDED
Status: DISCONTINUED | OUTPATIENT
Start: 2021-08-08 | End: 2021-08-09 | Stop reason: HOSPADM

## 2021-08-08 RX ORDER — LORAZEPAM 2 MG/ML
1 INJECTION INTRAMUSCULAR ONCE
Status: COMPLETED | OUTPATIENT
Start: 2021-08-08 | End: 2021-08-08

## 2021-08-08 RX ORDER — DEXTROSE MONOHYDRATE 50 MG/ML
50 INJECTION, SOLUTION INTRAVENOUS CONTINUOUS
Status: DISCONTINUED | OUTPATIENT
Start: 2021-08-08 | End: 2021-08-08

## 2021-08-08 RX ORDER — LEVETIRACETAM 10 MG/ML
1000 INJECTION INTRAVASCULAR ONCE
Status: COMPLETED | OUTPATIENT
Start: 2021-08-08 | End: 2021-08-08

## 2021-08-08 RX ADMIN — DESMOPRESSIN ACETATE 200 MCG: 0.2 TABLET ORAL at 15:50

## 2021-08-08 RX ADMIN — LEVETIRACETAM 1000 MG: 10 INJECTION INTRAVENOUS at 14:48

## 2021-08-08 RX ADMIN — AMLODIPINE BESYLATE 10 MG: 5 TABLET ORAL at 09:37

## 2021-08-08 RX ADMIN — LAMOTRIGINE 100 MG: 100 TABLET ORAL at 00:29

## 2021-08-08 RX ADMIN — DESMOPRESSIN ACETATE 200 MCG: 0.2 TABLET ORAL at 09:36

## 2021-08-08 RX ADMIN — MINERAL SUPPLEMENT IRON 300 MG / 5 ML STRENGTH LIQUID 100 PER BOX UNFLAVORED 300 MG: at 09:36

## 2021-08-08 RX ADMIN — LEVETIRACETAM 1250 MG: 100 INJECTION, SOLUTION INTRAVENOUS at 23:22

## 2021-08-08 RX ADMIN — DESMOPRESSIN ACETATE 200 MCG: 0.2 TABLET ORAL at 00:29

## 2021-08-08 RX ADMIN — MINERAL SUPPLEMENT IRON 300 MG / 5 ML STRENGTH LIQUID 100 PER BOX UNFLAVORED 300 MG: at 16:18

## 2021-08-08 RX ADMIN — LORAZEPAM 1 MG: 2 INJECTION INTRAMUSCULAR at 14:18

## 2021-08-08 RX ADMIN — SODIUM CHLORIDE 40 MG: 9 INJECTION, SOLUTION INTRAMUSCULAR; INTRAVENOUS; SUBCUTANEOUS at 00:51

## 2021-08-08 RX ADMIN — POTASSIUM CHLORIDE 40 MEQ: 1.5 FOR SOLUTION ORAL at 09:36

## 2021-08-08 RX ADMIN — LEVETIRACETAM 1000 MG: 500 TABLET ORAL at 00:29

## 2021-08-08 RX ADMIN — LORAZEPAM 1 MG: 2 INJECTION INTRAMUSCULAR; INTRAVENOUS at 14:18

## 2021-08-08 RX ADMIN — LEVETIRACETAM 1000 MG: 500 TABLET ORAL at 09:36

## 2021-08-08 RX ADMIN — LAMOTRIGINE 100 MG: 100 TABLET ORAL at 23:22

## 2021-08-08 RX ADMIN — LAMOTRIGINE 100 MG: 100 TABLET ORAL at 09:36

## 2021-08-08 RX ADMIN — DEXTROSE MONOHYDRATE 75 ML/HR: 50 INJECTION, SOLUTION INTRAVENOUS at 05:11

## 2021-08-08 NOTE — PROGRESS NOTES
Progress Note    Patient: Ivanna Briones MRN: [id number]  SSN: [id number]    YOB: 1950  Age: 66 y.o. Sex: female      Admit Date: 8/5/2021    LOS: 3 days     Subjective:     Rapid response was called on the patient for seizure patient cannot recollect event she is on Keppra 1 g twice a day. Overnight she had rectal bleed. GI evaluation is pending hemoglobin is stable    Objective:     Vitals:    08/07/21 1520 08/07/21 2041 08/08/21 0759 08/08/21 0937   BP: (!) 149/87 (!) 143/79 132/85 132/85   Pulse: 76 75 65 65   Resp: 18 17 18    Temp: 98.2 °F (36.8 °C) 97.9 °F (36.6 °C) 98.4 °F (36.9 °C)    SpO2: 96% 93% 91%    Weight:   72.7 kg (160 lb 4.4 oz)    Height:            Intake and Output:  Current Shift: 08/08 0701 - 08/08 1900  In: 120 [P.O.:120]  Out: -   Last three shifts: No intake/output data recorded. Physical Exam:   General:  Alert, cooperative, no distress, appears stated age. Eyes:  Conjunctivae/corneas clear. PERRL, EOMs intact. Fundi benign   Ears:  Normal TMs and external ear canals both ears. Nose: Nares normal. Septum midline. Mucosa normal. No drainage or sinus tenderness. Mouth/Throat: Lips, mucosa, and tongue normal. Teeth and gums normal.   Neck: Supple, symmetrical, trachea midline, no adenopathy, thyroid: no enlargment/tenderness/nodules, no carotid bruit and no JVD. Back:   Symmetric, no curvature. ROM normal. No CVA tenderness. Lungs:   Clear to auscultation bilaterally. Heart:  Regular rate and rhythm, S1, S2 normal, no murmur, click, rub or gallop. Abdomen:   Soft, non-tender. Bowel sounds normal. No masses,  No organomegaly. Extremities: Extremities normal, atraumatic, no cyanosis or edema. Pulses: 2+ and symmetric all extremities. Skin: Skin color, texture, turgor normal. No rashes or lesions   Lymph nodes: Cervical, supraclavicular, and axillary nodes normal.   Neurologic: CNII-XII intact. Normal strength, sensation and reflexes throughout. Lab/Data Review: All lab results for the last 24 hours reviewed. Recent Results (from the past 24 hour(s))   RENAL FUNCTION PANEL    Collection Time: 08/08/21 12:45 PM   Result Value Ref Range    Sodium 129 (L) 136 - 145 mmol/L    Potassium 4.1 3.5 - 5.1 mmol/L    Chloride 99 97 - 108 mmol/L    CO2 24 21 - 32 mmol/L    Anion gap 6 5 - 15 mmol/L    Glucose 110 (H) 65 - 100 mg/dL    BUN 13 6 - 20 mg/dL    Creatinine 1.19 (H) 0.55 - 1.02 mg/dL    BUN/Creatinine ratio 11 (L) 12 - 20      GFR est AA 54 (L) >60 ml/min/1.73m2    GFR est non-AA 45 (L) >60 ml/min/1.73m2    Calcium 8.5 8.5 - 10.1 mg/dL    Phosphorus 2.5 (L) 2.6 - 4.7 mg/dL    Albumin 3.3 (L) 3.5 - 5.0 g/dL   CBC WITH AUTOMATED DIFF    Collection Time: 08/08/21 12:45 PM   Result Value Ref Range    WBC 4.5 3.6 - 11.0 K/uL    RBC 5.13 3.80 - 5.20 M/uL    HGB 15.4 11.5 - 16.0 g/dL    HCT 48.3 (H) 35.0 - 47.0 %    MCV 94.2 80.0 - 99.0 FL    MCH 30.0 26.0 - 34.0 PG    MCHC 31.9 30.0 - 36.5 g/dL    RDW 14.2 11.5 - 14.5 %    PLATELET 320 832 - 088 K/uL    MPV 9.9 8.9 - 12.9 FL    NRBC 0.0 0.0  WBC    ABSOLUTE NRBC 0.00 0.00 - 0.01 K/uL    NEUTROPHILS 49 32 - 75 %    LYMPHOCYTES 36 12 - 49 %    MONOCYTES 11 5 - 13 %    EOSINOPHILS 3 0 - 7 %    BASOPHILS 1 0 - 1 %    IMMATURE GRANULOCYTES 0 0 - 0.5 %    ABS. NEUTROPHILS 2.2 1.8 - 8.0 K/UL    ABS. LYMPHOCYTES 1.6 0.8 - 3.5 K/UL    ABS. MONOCYTES 0.5 0.0 - 1.0 K/UL    ABS. EOSINOPHILS 0.1 0.0 - 0.4 K/UL    ABS. BASOPHILS 0.0 0.0 - 0.1 K/UL    ABS. IMM.  GRANS. 0.0 0.00 - 0.04 K/UL    DF AUTOMATED     PROTHROMBIN TIME + INR    Collection Time: 08/08/21 12:45 PM   Result Value Ref Range    Prothrombin time 14.4 11.9 - 14.7 sec    INR 1.1 0.9 - 1.1     TYPE & SCREEN    Collection Time: 08/08/21 12:45 PM   Result Value Ref Range    Crossmatch Expiration 08/11/2021,2359     ABO/Rh(D) AB Positive     Antibody screen Negative    GLUCOSE, POC    Collection Time: 08/08/21  1:57 PM   Result Value Ref Range Glucose (POC) 104 65 - 117 mg/dL    Performed by Alba Summers          Assessment:     Active Problems:    Hypernatremia (10/17/2020)      Seizure ([address] (8/5/2021)    Breakthrough seizure give 1 g of Keppra obtain Keppra level  Electrolyte abnormality  Diabetes insipidus  Rectal bleed    Plan:     Neurology on the case discussed with nursing staff and patient    Signed By: Juliet Cole MD     August 8, 2021

## 2021-08-08 NOTE — PROGRESS NOTES
Problem: Mobility Impaired (Adult and Pediatric)  Goal: *Acute Goals and Plan of Care (Insert Text)  Description: Pt will be I with LE HEP in 7 days. Pt will perform bed mobility with mod I in 7 days. Pt will perform transfers with mod I in 7 days. Pt will amb >200 feet with LRAD safely with mod I in 7 days. Outcome: Progressing Towards Goal   PHYSICAL THERAPY TREATMENT  Patient: Claude Hobby (77 y.o. female)  Date: 8/8/2021  Diagnosis: Hypernatremia [E87.0]  Seizure ([facility] [address] [R56.9] <principal problem not specified>       Precautions:    Chart, physical therapy assessment, plan of care and goals were reviewed. ASSESSMENT  Patient continues with skilled PT services and is progressing towards goals. Decreased assistance required for bed mobility. Transferred to BR, required cues for safety with toilet transfer. Min A for hygiene. Pt stood at sink ~1min while washing hands, no UE support required to maintain static stand, SBA-CGA. Amb in room short distances with rest breaks taken. Used personal SPC this tx, up to min A occ required. Overall slightly unsteady with amb, however no overt LOB noted. Participated with seated therex, tolerated added exercises and increased reps well. Will require further review of HEP. Pt hesitant with most mobility, verbalizing actions to PTA prior to performing to confirm what she was doing was correct. Current Level of Function Impacting Discharge (mobility/balance): Good-fair standing balance. Other factors to consider for discharge: High fall risk. PLAN :  Patient continues to benefit from skilled intervention to address the above impairments. Continue treatment per established plan of care. to address goals. Recommendation for discharge: (in order for the patient to meet his/her long term goals)  Therapy up to 5 days/week in SNF setting         SUBJECTIVE:   Patient stated Briana Foley just took my blood.     OBJECTIVE DATA SUMMARY:   Critical Behavior:  Neurologic State: Alert, Confused  Orientation Level: Oriented to time, Oriented to place, Oriented to person  Cognition: Follows commands, Appropriate safety awareness  Safety/Judgement: Decreased awareness of need for safety  Functional Mobility Training:  Bed Mobility:     Supine to Sit: Modified independent     Scooting: Stand-by assistance        Transfers:  Sit to Stand: Contact guard assistance  Stand to Sit: Contact guard assistance        Bed to Chair: Contact guard assistance                    Balance:  Sitting: Intact  Sitting - Static: Good (unsupported)  Sitting - Dynamic: Good (unsupported)  Standing: Intact; With support  Standing - Static: Good  Standing - Dynamic : Fair;Constant support  Ambulation/Gait Training:  Distance (ft): 50 Feet (ft) (15+30+5ft, SPC, CGA-min A)  Assistive Device: Gait belt;Cane, straight  Ambulation - Level of Assistance: Contact guard assistance;Minimal assistance                       Speed/Desiree: Slow  Step Length: Left shortened;Right shortened           Therapeutic Exercises:    Toe raises x15  Heel raises x15  LAQ x15  Seated march x15  Hip abd/add x15  Pain Ratin/10    Activity Tolerance:   Fair and requires rest breaks      After treatment patient left in no apparent distress:   Sitting in chair and Call bell within reach      [facility]   Time Calculation: 33 mins

## 2021-08-08 NOTE — PROGRESS NOTES
Problem: Self Care Deficits Care Plan (Adult)  Goal: *Acute Goals and Plan of Care (Insert Text)  Description: Pt will be modified independence  LB dressing EOB level  Pt will be modified independence  sit<-> prep for toilet transfer  Pt will be modified independence  BSC/toilet transfer with LRAD  Pt will be modified independence  toileting/cloth mgmt LRAD  Pt will be modified independence  grooming standing sink  Pt will be supervision/set-up bathing sitting/standing sink LRAD  Pt will be MI kala UE HEP in prep for self care tasks  Outcome: Not Met     Problem: Patient Education: Go to Patient Education Activity  Goal: Patient/Family Education  Outcome: Not Met   OCCUPATIONAL THERAPY EVALUATION  Patient: Josette Landaverde (66 y.o. female)  Date: 8/8/2021  Primary Diagnosis: Hypernatremia [E87.0]  Seizure ([address] [R56.9]        Precautions:   Seizure    ASSESSMENT  Pt is a 65 yo female with a PMH of anemia, cerebral aneurysm, CKD, dementia, daibetes insipidus, DVT, GI bleed, HLD, HTN, stroke, and seizure disorder who presented to [facility] on 8/5/2021 via EMS after possible seizure at [address] [facility]. Pt was A&Ox4 prior to ED arrival, labs drawn and pt tp ne hypernatremic. Pt demined abdominal pain, dysuria, but noted swelling in her legs, which was taken place over the last 2-3 weeks and a fluttering contraction of her R eyelid. Pt's PLOF: Pt is a long term resident of SNF, was IND with her self care and mod I for mobility with RW/SPC. Pt received in room semi-supine, requesting to use the restroom. Pt able to report she uses a cane at baseline, ambulated into bathroom with SPC, CGA, and assistance with managing IV pole. Pt transferred to toilet with supervision and assist with positioning IV pole, pt managed hygiene with IND. Pt ambulated to sink for hand washing supervision for safety, hair brushing with supervision at sink. Pt appropriate during assessment and assisted pt back to bed with CGA.  Once semi-supine, pt began to present with tremors, R side eye and facial twitching, and did not reponse to therapist's voice. Notified nursing who came to room and assessed pt; rapid reponse was called and pt's MD arrived. Pt left in nursing care. Pt presents with deficits in her self care and functional mobility, including decreased activity tolerance, slowed gait, decreased standing balance, and is a fall risk. Pt would benefit from acute OT service to address her safety and IND for her ADLs. Recommend pt discharge to SNF [place] when medically appropriate. PLAN :  Recommendations and Planned Interventions: self care training, functional mobility training, balance training, therapeutic activities, endurance activities, neuromuscular re-education, and patient education    Frequency/Duration: Patient will be followed by occupational therapy 3 times a week to address goals. Recommendation for discharge: (in order for the patient to meet his/her long term goals)  Recommend discharge to SNF when medically appropriate. This discharge recommendation:  Has not yet been discussed the attending provider and/or case management    IF patient discharges home will need the following DME: none       SUBJECTIVE:   Patient stated I need my comb.     OBJECTIVE DATA SUMMARY:   HISTORY:   Past Medical History:   Diagnosis Date    Anemia     Cerebral aneurysm     with repair    Chronic kidney disease     Dementia (HCC)     GI bleed     Hyperlipidemia     Hypertension     Partial diabetes insipidus ([facility] [address]     Rectal bleeding     Seizure ([facility] [address]     Stroke [facility])      Past Surgical History:   Procedure Laterality Date    HX GI  07/08/2021    PEG replacement    HX OTHER SURGICAL      PEG TUBE       Expanded or extensive additional review of patient history:     Home Situation  Home Environment: Skilled nursing facility  # Steps to Enter: 0  One/Two Story Residence: Other (Comment)  Living Alone: No  Support Systems: Skilled nursing facility  Patient Expects to be Discharged to[de-identified] Long-term care  Current DME Used/Available at Home: [place], straight, Walker, rolling    Hand dominance: Right    EXAMINATION OF PERFORMANCE DEFICITS:  Cognitive/Behavioral Status:  Neurologic State: Alert;Confused  Orientation Level: Oriented X4  Cognition: Appropriate decision making; Follows commands        Safety/Judgement: Awareness of environment    Hearing: Auditory  Auditory Impairment: None    Range of Motion:    AROM: Within functional limits    Strength:    Strength: Within functional limits    Coordination:     Fine Motor Skills-Upper: Left Intact; Right Intact      Tone & Sensation:    Tone: Normal    Balance:  Sitting: Intact  Sitting - Static: Good (unsupported)  Sitting - Dynamic: Good (unsupported)  Standing: Intact; With support  Standing - Static: Good  Standing - Dynamic : Constant support; Fair    Functional Mobility and Transfers for ADLs:  Bed Mobility:  Supine to Sit: Modified independent  Sit to Supine: Contact guard assistance  Scooting: Supervision    Transfers:  Sit to Stand: Contact guard assistance  Stand to Sit: Contact guard assistance  Bed to Chair: Contact guard assistance  Bathroom Mobility: Contact guard assistance  Toilet Transfer : Contact guard assistance    ADL Assessment:     Toileting: Independent    ADL Intervention and task modifications:       Grooming  Grooming Assistance: Supervision  Position Performed: Standing  Washing Hands: Supervision    Toileting  Toileting Assistance: Independent  Bladder Hygiene: Independent  Bowel Hygiene: Independent    Cognitive Retraining  Safety/Judgement: Awareness of environment    Functional Measure:    [address] [facility] [address] [address] AM-PACTM \"6 Clicks\"                                                       Daily Activity Inpatient Short Form  How much help from another person does the patient currently need. .. Total; A Lot A Little None   1. Putting on and taking off regular lower body clothing?  [] 1 []  2 [x]  3 []  4   2. Bathing (including washing, rinsing, drying)? []  1 []  2 [x]  3 []  4   3. Toileting, which includes using toilet, bedpan or urinal? [] 1 []  2 [x]  3 []  4   4. Putting on and taking off regular upper body clothing? []  1 []  2 [x]  3 []  4   5. Taking care of personal grooming such as brushing teeth? []  1 []  2 [x]  3 []  4   6. Eating meals? []  1 []  2 []  3 [x]  4   © , Trustees [address] [address], under license to [employer]. All rights reserved     Score:      Interpretation of Tool:  Represents clinically-significant functional categories (i.e. Activities of daily living). Percentage of Impairment CH    0%   CI    1-19% CJ    20-39% CK    40-59% CL    60-79% CM    80-99% CN     100%   [facility]  Score 6-24 24 23 20-22 15-19 10-14 7-9 6        Occupational Therapy Evaluation Charge Determination   History Examination Decision-Making   MEDIUM Complexity : Expanded review of history including physical, cognitive and psychosocial  history  MEDIUM Complexity : 3-5 performance deficits relating to physical, cognitive , or psychosocial skils that result in activity limitations and / or participation restrictions MEDIUM Complexity : Patient may present with comorbidities that affect occupational performnce. Miniml to moderate modification of tasks or assistance (eg, physical or verbal ) with assesment(s) is necessary to enable patient to complete evaluation       Based on the above components, the patient evaluation is determined to be of the following complexity level: MEDIUM  Pain Ratin/10    Activity Tolerance:   Good  Please refer to the flowsheet for vital signs taken during this treatment. After treatment patient left in no apparent distress:    Supine in bed, Call bell within reach, and nursing staff present. COMMUNICATION/EDUCATION:   The patients plan of care was discussed with: Registered nurse.      Patient/family agree to work toward stated goals and plan of care. This patients plan of care is appropriate for delegation to MARGARETTE.     Thank you for this referral.  Enoch Alejo, OT  Time Calculation: 34 mins

## 2021-08-08 NOTE — PROGRESS NOTES
Physical therapist called and stated patient look like she is having a seizure. Went to room . Patient in bed, confused talking to self. Speech garbled. Head tremor noted for a few seconds, then patient responded appropriately to simple questions, paytient exhibited several episodes of  Tremor, slurred speech and tremor of the arms. Blood sugar checked. Vs checked. Oxugen 96% Pulse 70 /81  14:05 rapid response called.  and team at bedside. Ativan and Keppra ordered. 14:10 Rapid Response ended.

## 2021-08-08 NOTE — PROGRESS NOTES
0225 - Attempted to call phlebotomy regarding H&H. No answer. 9305 - Attempted x2 to contact phlebotomist regarding H&H and new stat orders. No success.

## 2021-08-08 NOTE — CONSULTS
NEURO CONSULT      REASON FOR ADMISSION:    Recurrent seizures    HISTORY:    Ms. fallon is 66years old with a history of seizures in the past, diabetes, DVT, who was admitted to the hospital for seizures and has been relatively stable until a few hours ago when apparently she had repeated seizures. Her antiseizure medication was increased and neurology was called. Patient reportedly eventually stopped seizing and is mostly back to baseline at this time. An MRI of the brain has been ordered but it cannot be done secondary to the fact that the patient has aneurysmal clips.       ROS: As per above    General:                     No fever, no chills, no sweats, no generalized weakness, no weight loss/gain,                                       No loss of appetite   Eyes:                           No blurred vision, no eye pain, no loss of vision, no double vision  ENT:                            rhinorrhea, no pharyngitis   Respiratory:               No cough, no sputum production, no SOB, no CANALES, no wheezing, no pleuritic pain   Cardiology:                No chest pain, no palpitations, no orthopnea, no PND, no edema, no syncope   Gastrointestinal:       No abdominal pain , no N/V, no diarrhea, no dysphagia, no constipation, no bleeding   Genitourinary:           frequency, no urgency, no dysuria, no hematuria, no incontinence   Muskuloskeletal :      No arthralgia, no myalgia, no back pain  Hematology:              No easy bruising, no nose or gum bleeding, no lymphadenopathy   Dermatological:         No rash, no ulceration, no pruritis, no color change / jaundice  Endocrine:                 hot flashes or polydipsia   Neurological:             No headache, no dizziness, no confusion, no focal weakness, no paresthesia,                                      No Speech difficulties, no memory loss, no gait difficulty  Psychological:          No neelings of anxiety, no depression, no agitation      NEURO EXAM:    Mental status: Awake soft-spoken, oriented to day, and location. Follows commands    Cranial nerves: Cranial nerve exam intact    Motor exam: Motor exam intact    Sensory exam: Sensory exam intact    Coordination: Coordination fair    Gait and Station: Gait and station failed    ASSESSMENT:  Current seizures. Seizures have stopped. PLAN:  Seizure precautions  Continue current Keppra level  EEG in a.m.   Alert and static      ALLERGIES:    No Known Allergies    MEDS:      Current Facility-Administered Medications:     0.9% sodium chloride infusion 250 mL, 250 mL, IntraVENous, PRN, Randall Granados MD    levETIRAcetam (KEPPRA) 1,250 mg in 0.9% sodium chloride 100 mL IVPB, 1.25 g, IntraVENous, Q12H, Randall Hernandes MD    LORazepam (ATIVAN) injection 1 mg, 1 mg, IntraVENous, Q4H PRN, Randall Granados MD    pantoprazole (PROTONIX) 40 mg in 0.9% sodium chloride 10 mL injection, 40 mg, IntraVENous, DAILY, Randall Hernandes MD, 40 mg at 08/08/21 0051    potassium chloride (KLOR-CON) packet for solution 40 mEq, 40 mEq, Oral, DAILY, Niranjan CAM MD, 40 mEq at 08/08/21 0936    amLODIPine (NORVASC) tablet 10 mg, 10 mg, Oral, DAILY, Ehsan Dailey MD, 10 mg at 08/08/21 8895    [Held by provider] rivaroxaban (XARELTO) tablet 20 mg, 20 mg, Oral, DAILY WITH DINNER, Ehsan Dailey MD, 20 mg at 08/07/21 1601    calcium carbonate (TUMS) chewable tablet 1,000 mg [elemental], 1,000 mg, Oral, Q6H PRN, Ehsan Dailey MD    desmopressin (DDAVP) tablet 200 mcg, 0.2 mg, Oral, TID, Ehsan Dailey MD, 200 mcg at 08/08/21 1550    lamoTRIgine (LaMICtal) tablet 100 mg, 100 mg, Oral, BID, Ehsan Dailey MD, 100 mg at 08/08/21 8001    acetaminophen (TYLENOL) tablet 650 mg, 650 mg, Oral, Q6H PRN, 650 mg at 08/05/21 2330 **OR** acetaminophen (TYLENOL) suppository 650 mg, 650 mg, Rectal, Q6H PRN, Ehsan Dailey MD    polyethylene glycol (MIRALAX) packet 17 g, 17 g, Oral, DAILY PRN, Liliya Hdz MD  [facility] ondansetron (ZOFRAN ODT) tablet 4 mg, 4 mg, Oral, Q8H PRN **OR** ondansetron (ZOFRAN) injection 4 mg, 4 mg, IntraVENous, Q6H PRN, Ehsan Dailey MD    ferrous sulfate 300 mg (60 mg iron)/5 mL oral syrup 300 mg, 300 mg, Oral, BID WITH MEALS, Ehsan Dailey MD, 300 mg at 08/08/21 1618    LABS:  Recent Results (from the past 24 hour(s))   RENAL FUNCTION PANEL    Collection Time: 08/08/21 12:45 PM   Result Value Ref Range    Sodium 129 (L) 136 - 145 mmol/L    Potassium 4.1 3.5 - 5.1 mmol/L    Chloride 99 97 - 108 mmol/L    CO2 24 21 - 32 mmol/L    Anion gap 6 5 - 15 mmol/L    Glucose 110 (H) 65 - 100 mg/dL    BUN 13 6 - 20 mg/dL    Creatinine 1.19 (H) 0.55 - 1.02 mg/dL    BUN/Creatinine ratio 11 (L) 12 - 20      GFR est AA 54 (L) >60 ml/min/1.73m2    GFR est non-AA 45 (L) >60 ml/min/1.73m2    Calcium 8.5 8.5 - 10.1 mg/dL    Phosphorus 2.5 (L) 2.6 - 4.7 mg/dL    Albumin 3.3 (L) 3.5 - 5.0 g/dL   CBC WITH AUTOMATED DIFF    Collection Time: 08/08/21 12:45 PM   Result Value Ref Range    WBC 4.5 3.6 - 11.0 K/uL    RBC 5.13 3.80 - 5.20 M/uL    HGB 15.4 11.5 - 16.0 g/dL    HCT 48.3 (H) 35.0 - 47.0 %    MCV 94.2 80.0 - 99.0 FL    MCH 30.0 26.0 - 34.0 PG    MCHC 31.9 30.0 - 36.5 g/dL    RDW 14.2 11.5 - 14.5 %    PLATELET 521 843 - 743 K/uL    MPV 9.9 8.9 - 12.9 FL    NRBC 0.0 0.0  WBC    ABSOLUTE NRBC 0.00 0.00 - 0.01 K/uL    NEUTROPHILS 49 32 - 75 %    LYMPHOCYTES 36 12 - 49 %    MONOCYTES 11 5 - 13 %    EOSINOPHILS 3 0 - 7 %    BASOPHILS 1 0 - 1 %    IMMATURE GRANULOCYTES 0 0 - 0.5 %    ABS. NEUTROPHILS 2.2 1.8 - 8.0 K/UL    ABS. LYMPHOCYTES 1.6 0.8 - 3.5 K/UL    ABS. MONOCYTES 0.5 0.0 - 1.0 K/UL    ABS. EOSINOPHILS 0.1 0.0 - 0.4 K/UL    ABS. BASOPHILS 0.0 0.0 - 0.1 K/UL    ABS. IMM.  GRANS. 0.0 0.00 - 0.04 K/UL    DF AUTOMATED     PROTHROMBIN TIME + INR    Collection Time: 08/08/21 12:45 PM   Result Value Ref Range    Prothrombin time 14.4 11.9 - 14.7 sec    INR 1.1 0.9 - 1.1     TYPE & SCREEN    Collection Time: 08/08/21 12:45 PM   Result Value Ref Range    Crossmatch Expiration 08/11/2021,2359     ABO/Rh(D) AB Positive     Antibody screen Negative    GLUCOSE, POC    Collection Time: 08/08/21  1:57 PM   Result Value Ref Range    Glucose (POC) 104 65 - 117 mg/dL    Performed by Rod Bills    HGB & HCT    Collection Time: 08/08/21  3:30 PM   Result Value Ref Range    HGB 14.3 11.5 - 16.0 g/dL    HCT 44.0 35.0 - 47.0 %       Visit Vitals  /85   Pulse 65   Temp 98.4 °F (36.9 °C)   Resp 18   Ht 5' 6\" (1.676 m)   Wt 72.7 kg (160 lb 4.4 oz)   SpO2 95%   BMI 25.87 kg/m²       Imaging:  US RETROPERITONEUM COMP   Final Result   Normal exam

## 2021-08-08 NOTE — PROGRESS NOTES
Informed by nursing staff patient has bright red blood per rectum. Patient was on Xarelto. She denies any abdominal pain. She is admitted for seizure due to electrolyte abnormality from diabetes insipidus.   Discontinue Xarelto transfuse with plasma, SCD for DVT prophylaxis and GI consult with IV Protonix  Critical care service provided

## 2021-08-08 NOTE — PROGRESS NOTES
Visit attempted for patient in the [address] [place] [address] med/oncology unit for initial assessment. Medical staff were providing care for the patient during the visit. No family members were present. Provided silent support and prayer. Chaplains will follow up if further referrals are requested. Chaplain Gianna Rose M.Div.    can be reached by calling the  at [facility]  [phone]

## 2021-08-08 NOTE — PROGRESS NOTES
Patient has had 2 soft BM with large amount  aguilar red blood. Notified Dr. Brennon Perez (on call). Order received to hold xarelto, H&H Q8 hours, consult Dr. Jessie Luque for gastro, and add protonix IV 40 mg daily with first dose today.

## 2021-08-08 NOTE — PROGRESS NOTES
Dr. Minerva Benson notified earlier that MRI could not be done due to hx of aneurysm surgery with metal clips;  Report given to Abhi Mari RN oncoming RN to include sbar, mar, karbrittani, recent orders and changes;

## 2021-08-08 NOTE — PROGRESS NOTES
Name: Kelsea Dudley MRN: [id number]   : 1950 Hospital: [facility]   Date: 2021        IMPRESSION:   · Hypernatremia from partial central DI - now on dextrose IV and DDAVP. Hypernatremia is overcorrected. Na is down to 129  · ANTONIA - unclear etiology. Scr is trending down. · Hypokalemia - urine losses. PLAN:   · Replace K and check the Mg  · D/C the Dextrose IVF  · Repet renal panel in AM     Subjective/Interval History:   I have reviewed the flowsheet and previous days notes. ROS:A comprehensive review of systems was negative. Objective:   Vital Signs:    Visit Vitals  /85   Pulse 65   Temp 98.4 °F (36.9 °C)   Resp 18   Ht 5' 6\" (1.676 m)   Wt 72.7 kg (160 lb 4.4 oz)   SpO2 91%   BMI 25.87 kg/m²       O2 Device: None (Room air)       Temp (24hrs), Av.2 °F (36.8 °C), Min:97.9 °F (36.6 °C), Max:98.4 °F (36.9 °C)       Intake/Output:   Last shift:       0701 -  1900  In: 120 [P.O.:120]  Out: -   Last 3 shifts: No intake/output data recorded. Intake/Output Summary (Last 24 hours) at 2021 1542  Last data filed at 2021 9717  Gross per 24 hour   Intake 120 ml   Output    Net 120 ml        Physical Exam:  General:    Alert, cooperative, no distress, disoriented. Head:   Normocephalic, without obvious abnormality, atraumatic. Eyes:   Conjunctivae/corneas clear. Nose:  Nares normal. No drainage or sinus tenderness. Throat:    Lips, mucosa, and tongue normal.  No Thrush  Neck:  Supple, symmetrical,  no adenopathy, thyroid: non tender    no carotid bruit and no JVD. Lungs:   Clear to auscultation bilaterally. No Wheezing or Rhonchi. No rales. Chest wall:  No tenderness or deformity. No Accessory muscle use. Heart:   Regular rate and rhythm,  no murmur, rub or gallop. Abdomen:   Soft, non-tender. Not distended. Bowel sounds normal. No masses  Extremities: Extremities normal, atraumatic, No cyanosis. No edema.  No clubbing  Skin: Texture, turgor normal. No rashes or lesions. Not Jaundiced  Psych:  Fair insight. Not depressed. Not anxious or agitated. DATA:  Labs:  Recent Labs     08/08/21  1245 08/07/21  1151 08/06/21  0928   * 138  140 149*   K 4.1 3.5  3.5 3.2*   CL 99 106  108 113*   CO2 24 25  27 30   BUN 13 14  14 18   CREA 1.19* 1.12*  1.23* 1.56*   CA 8.5 8.1*  8.2* 9.1   ALB 3.3* 2.9*  3.0* 3.5   PHOS 2.5* 2.4*  --    MG  --  1.9  --      Recent Labs     08/08/21  1245 08/07/21  1151 08/06/21  0928   WBC 4.5 4.5 4.8   HGB 15.4 14.0 15.6   HCT 48.3* 43.7 49.6*    181 200     No results for input(s): JOSE RAUL, KU, CLU, CREAU in the last 72 hours.     No lab exists for component: PROU    Total time spent with patient:  35 minutes    [] Critical Care Provided    Care Plan discussed with:   Staff, Medical Team   D/W NIKOLAY Sims MD

## 2021-08-09 VITALS
BODY MASS INDEX: 25.76 KG/M2 | DIASTOLIC BLOOD PRESSURE: 76 MMHG | HEART RATE: 60 BPM | OXYGEN SATURATION: 94 % | HEIGHT: 66 IN | RESPIRATION RATE: 12 BRPM | WEIGHT: 160.27 LBS | TEMPERATURE: 97.4 F | SYSTOLIC BLOOD PRESSURE: 131 MMHG

## 2021-08-09 LAB
ANION GAP SERPL CALC-SCNC: 7 MMOL/L (ref 5–15)
BUN SERPL-MCNC: 11 MG/DL (ref 6–20)
BUN/CREAT SERPL: 13 (ref 12–20)
CA-I BLD-MCNC: 8.8 MG/DL (ref 8.5–10.1)
CHLORIDE SERPL-SCNC: 99 MMOL/L (ref 97–108)
CO2 SERPL-SCNC: 24 MMOL/L (ref 21–32)
CREAT SERPL-MCNC: 0.86 MG/DL (ref 0.55–1.02)
GLUCOSE BLD STRIP.AUTO-MCNC: 85 MG/DL (ref 65–117)
GLUCOSE SERPL-MCNC: 83 MG/DL (ref 65–100)
HCT VFR BLD AUTO: 40.1 % (ref 35–47)
HCT VFR BLD AUTO: 44.2 % (ref 35–47)
HGB BLD-MCNC: 13.3 G/DL (ref 11.5–16)
HGB BLD-MCNC: 15.1 G/DL (ref 11.5–16)
PERFORMED BY, TECHID: NORMAL
POTASSIUM SERPL-SCNC: 3.2 MMOL/L (ref 3.5–5.1)
PROLACTIN SERPL-MCNC: 11.7 NG/ML
SODIUM SERPL-SCNC: 130 MMOL/L (ref 136–145)

## 2021-08-09 PROCEDURE — 36430 TRANSFUSION BLD/BLD COMPNT: CPT

## 2021-08-09 PROCEDURE — 74011250637 HC RX REV CODE- 250/637: Performed by: INTERNAL MEDICINE

## 2021-08-09 PROCEDURE — 80048 BASIC METABOLIC PNL TOTAL CA: CPT

## 2021-08-09 PROCEDURE — 74011250637 HC RX REV CODE- 250/637: Performed by: NURSE PRACTITIONER

## 2021-08-09 PROCEDURE — 85014 HEMATOCRIT: CPT

## 2021-08-09 PROCEDURE — 74011250636 HC RX REV CODE- 250/636: Performed by: INTERNAL MEDICINE

## 2021-08-09 PROCEDURE — C9113 INJ PANTOPRAZOLE SODIUM, VIA: HCPCS | Performed by: INTERNAL MEDICINE

## 2021-08-09 PROCEDURE — 74011000258 HC RX REV CODE- 258: Performed by: INTERNAL MEDICINE

## 2021-08-09 PROCEDURE — 74011000250 HC RX REV CODE- 250: Performed by: INTERNAL MEDICINE

## 2021-08-09 PROCEDURE — 74011250637 HC RX REV CODE- 250/637: Performed by: FAMILY MEDICINE

## 2021-08-09 PROCEDURE — 36415 COLL VENOUS BLD VENIPUNCTURE: CPT

## 2021-08-09 RX ORDER — HYDROCORTISONE ACETATE 25 MG/1
25 SUPPOSITORY RECTAL EVERY 12 HOURS
Status: DISCONTINUED | OUTPATIENT
Start: 2021-08-09 | End: 2021-08-09 | Stop reason: HOSPADM

## 2021-08-09 RX ORDER — LEVETIRACETAM 250 MG/1
250 TABLET ORAL 2 TIMES DAILY
Qty: 60 TABLET | Refills: 0 | Status: SHIPPED | OUTPATIENT
Start: 2021-08-09 | End: 2021-08-31

## 2021-08-09 RX ORDER — POTASSIUM CHLORIDE 750 MG/1
40 TABLET, FILM COATED, EXTENDED RELEASE ORAL
Status: COMPLETED | OUTPATIENT
Start: 2021-08-09 | End: 2021-08-09

## 2021-08-09 RX ADMIN — MINERAL SUPPLEMENT IRON 300 MG / 5 ML STRENGTH LIQUID 100 PER BOX UNFLAVORED 300 MG: at 16:57

## 2021-08-09 RX ADMIN — DESMOPRESSIN ACETATE 200 MCG: 0.2 TABLET ORAL at 16:57

## 2021-08-09 RX ADMIN — POTASSIUM CHLORIDE 40 MEQ: 750 TABLET, FILM COATED, EXTENDED RELEASE ORAL at 13:16

## 2021-08-09 RX ADMIN — MINERAL SUPPLEMENT IRON 300 MG / 5 ML STRENGTH LIQUID 100 PER BOX UNFLAVORED 300 MG: at 09:47

## 2021-08-09 RX ADMIN — AMLODIPINE BESYLATE 10 MG: 5 TABLET ORAL at 09:43

## 2021-08-09 RX ADMIN — DESMOPRESSIN ACETATE 200 MCG: 0.2 TABLET ORAL at 00:43

## 2021-08-09 RX ADMIN — LAMOTRIGINE 100 MG: 100 TABLET ORAL at 09:43

## 2021-08-09 RX ADMIN — DESMOPRESSIN ACETATE 200 MCG: 0.2 TABLET ORAL at 09:43

## 2021-08-09 RX ADMIN — LEVETIRACETAM 1250 MG: 100 INJECTION, SOLUTION INTRAVENOUS at 09:47

## 2021-08-09 RX ADMIN — SODIUM CHLORIDE 40 MG: 9 INJECTION, SOLUTION INTRAMUSCULAR; INTRAVENOUS; SUBCUTANEOUS at 00:41

## 2021-08-09 RX ADMIN — HYDROCORTISONE ACETATE 25 MG: 25 SUPPOSITORY RECTAL at 16:57

## 2021-08-09 RX ADMIN — POTASSIUM CHLORIDE 40 MEQ: 1.5 FOR SOLUTION ORAL at 09:43

## 2021-08-09 NOTE — MED STUDENT NOTES
General Daily Progress Note          Patient Name:   Saumya Wilcox       YOB: 1950       Age:  66 y.o. Admit Date: 8/5/2021      Subjective:        Patient is a 65 y. o. year old female with past medical history of a seizure disorder, diabetes insipidus, history of DVT presented to the ED the morning of 8/5/21 at [address] [facility] via EMS after possible seizure due to suspected hypernatremia. Patient is alert and oriented in her bed this morning. Over the weekend the patient experienced some hematochezia with her bowel movements. She also had multiple witnessed seizure, Neurology was consulted and the patients Keppra dose was adjusted to 1250mg IV BID. Also per Neurology, patient is scheduled for EEG today. She otherwise denies chest pain, shortness of breath, fever, chills, dysuria, trouble urinating, nausea, vomiting or constipation.       Objective:     Visit Vitals  /70 (BP 1 Location: Right upper arm, BP Patient Position: At rest)   Pulse 73   Temp 97.6 °F (36.4 °C)   Resp 17   Ht 5' 6\" (1.676 m)   Wt 160 lb 4.4 oz (72.7 kg)   SpO2 92%   BMI 25.87 kg/m²        Recent Results (from the past 24 hour(s))   RENAL FUNCTION PANEL    Collection Time: 08/08/21 12:45 PM   Result Value Ref Range    Sodium 129 (L) 136 - 145 mmol/L    Potassium 4.1 3.5 - 5.1 mmol/L    Chloride 99 97 - 108 mmol/L    CO2 24 21 - 32 mmol/L    Anion gap 6 5 - 15 mmol/L    Glucose 110 (H) 65 - 100 mg/dL    BUN 13 6 - 20 mg/dL    Creatinine 1.19 (H) 0.55 - 1.02 mg/dL    BUN/Creatinine ratio 11 (L) 12 - 20      GFR est AA 54 (L) >60 ml/min/1.73m2    GFR est non-AA 45 (L) >60 ml/min/1.73m2    Calcium 8.5 8.5 - 10.1 mg/dL    Phosphorus 2.5 (L) 2.6 - 4.7 mg/dL    Albumin 3.3 (L) 3.5 - 5.0 g/dL   CBC WITH AUTOMATED DIFF    Collection Time: 08/08/21 12:45 PM   Result Value Ref Range    WBC 4.5 3.6 - 11.0 K/uL    RBC 5.13 3.80 - 5.20 M/uL    HGB 15.4 11.5 - 16.0 g/dL    HCT 48.3 (H) 35.0 - 47.0 %    MCV 94.2 80.0 - 99.0 FL    MCH 30.0 26.0 - 34.0 PG    MCHC 31.9 30.0 - 36.5 g/dL    RDW 14.2 11.5 - 14.5 %    PLATELET 326 231 - 792 K/uL    MPV 9.9 8.9 - 12.9 FL    NRBC 0.0 0.0  WBC    ABSOLUTE NRBC 0.00 0.00 - 0.01 K/uL    NEUTROPHILS 49 32 - 75 %    LYMPHOCYTES 36 12 - 49 %    MONOCYTES 11 5 - 13 %    EOSINOPHILS 3 0 - 7 %    BASOPHILS 1 0 - 1 %    IMMATURE GRANULOCYTES 0 0 - 0.5 %    ABS. NEUTROPHILS 2.2 1.8 - 8.0 K/UL    ABS. LYMPHOCYTES 1.6 0.8 - 3.5 K/UL    ABS. MONOCYTES 0.5 0.0 - 1.0 K/UL    ABS. EOSINOPHILS 0.1 0.0 - 0.4 K/UL    ABS. BASOPHILS 0.0 0.0 - 0.1 K/UL    ABS. IMM. GRANS. 0.0 0.00 - 0.04 K/UL    DF AUTOMATED     PROTHROMBIN TIME + INR    Collection Time: 08/08/21 12:45 PM   Result Value Ref Range    Prothrombin time 14.4 11.9 - 14.7 sec    INR 1.1 0.9 - 1.1     TYPE & SCREEN    Collection Time: 08/08/21 12:45 PM   Result Value Ref Range    Crossmatch Expiration 08/11/2021,2359     ABO/Rh(D) AB Positive     Antibody screen Negative    GLUCOSE, POC    Collection Time: 08/08/21  1:57 PM   Result Value Ref Range    Glucose (POC) 104 65 - 117 mg/dL    Performed by Misael Martinez    HGB & HCT    Collection Time: 08/08/21  3:30 PM   Result Value Ref Range    HGB 14.3 11.5 - 16.0 g/dL    HCT 44.0 35.0 - 47.0 %     [unfilled]      Review of Systems    Constitutional: Negative for chills and fever. HENT: Negative. Eyes: Negative. Respiratory: Negative. Cardiovascular: Negative. Gastrointestinal: Negative for abdominal pain and nausea. Skin: Negative. Neurological: Negative. Physical Exam:      Constitutional: pt is oriented to person, place, and time. HENT:   Head: Normocephalic and atraumatic. Eyes: Pupils are equal, round, and reactive to light. EOM are normal.   Cardiovascular: Normal rate, regular rhythm and normal heart sounds. Pulmonary/Chest: Breath sounds normal. No wheezes. No rales. Exhibits no tenderness. Abdominal: Soft. Bowel sounds are normal. There is no abdominal tenderness. There is no rebound and no guarding. Musculoskeletal: Normal range of motion. Neurological: pt is alert and oriented to person, place, and time. US RETROPERITONEUM COMP   Final Result   Normal exam              Recent Results (from the past 24 hour(s))   RENAL FUNCTION PANEL    Collection Time: 08/08/21 12:45 PM   Result Value Ref Range    Sodium 129 (L) 136 - 145 mmol/L    Potassium 4.1 3.5 - 5.1 mmol/L    Chloride 99 97 - 108 mmol/L    CO2 24 21 - 32 mmol/L    Anion gap 6 5 - 15 mmol/L    Glucose 110 (H) 65 - 100 mg/dL    BUN 13 6 - 20 mg/dL    Creatinine 1.19 (H) 0.55 - 1.02 mg/dL    BUN/Creatinine ratio 11 (L) 12 - 20      GFR est AA 54 (L) >60 ml/min/1.73m2    GFR est non-AA 45 (L) >60 ml/min/1.73m2    Calcium 8.5 8.5 - 10.1 mg/dL    Phosphorus 2.5 (L) 2.6 - 4.7 mg/dL    Albumin 3.3 (L) 3.5 - 5.0 g/dL   CBC WITH AUTOMATED DIFF    Collection Time: 08/08/21 12:45 PM   Result Value Ref Range    WBC 4.5 3.6 - 11.0 K/uL    RBC 5.13 3.80 - 5.20 M/uL    HGB 15.4 11.5 - 16.0 g/dL    HCT 48.3 (H) 35.0 - 47.0 %    MCV 94.2 80.0 - 99.0 FL    MCH 30.0 26.0 - 34.0 PG    MCHC 31.9 30.0 - 36.5 g/dL    RDW 14.2 11.5 - 14.5 %    PLATELET 180 110 - 182 K/uL    MPV 9.9 8.9 - 12.9 FL    NRBC 0.0 0.0  WBC    ABSOLUTE NRBC 0.00 0.00 - 0.01 K/uL    NEUTROPHILS 49 32 - 75 %    LYMPHOCYTES 36 12 - 49 %    MONOCYTES 11 5 - 13 %    EOSINOPHILS 3 0 - 7 %    BASOPHILS 1 0 - 1 %    IMMATURE GRANULOCYTES 0 0 - 0.5 %    ABS. NEUTROPHILS 2.2 1.8 - 8.0 K/UL    ABS. LYMPHOCYTES 1.6 0.8 - 3.5 K/UL    ABS. MONOCYTES 0.5 0.0 - 1.0 K/UL    ABS. EOSINOPHILS 0.1 0.0 - 0.4 K/UL    ABS. BASOPHILS 0.0 0.0 - 0.1 K/UL    ABS. IMM.  GRANS. 0.0 0.00 - 0.04 K/UL    DF AUTOMATED     PROTHROMBIN TIME + INR    Collection Time: 08/08/21 12:45 PM   Result Value Ref Range    Prothrombin time 14.4 11.9 - 14.7 sec    INR 1.1 0.9 - 1.1     TYPE & SCREEN    Collection Time: 08/08/21 12:45 PM   Result Value Ref Range    Crossmatch Expiration 08/11/2021,2359     ABO/Rh(D) AB Positive     Antibody screen Negative    GLUCOSE, POC    Collection Time: 08/08/21  1:57 PM   Result Value Ref Range    Glucose (POC) 104 65 - 117 mg/dL    Performed by Renan Rolon    HGB & HCT    Collection Time: 08/08/21  3:30 PM   Result Value Ref Range    HGB 14.3 11.5 - 16.0 g/dL    HCT 44.0 35.0 - 47.0 %       Results     ** No results found for the last 336 hours. **           Labs:     Recent Labs     08/08/21  1530 08/08/21  1245 08/07/21  1151 08/07/21  1151   WBC  --  4.5  --  4.5   HGB 14.3 15.4   < > 14.0   HCT 44.0 48.3*   < > 43.7   PLT  --  151  --  181    < > = values in this interval not displayed. Recent Labs     08/08/21  1245 08/07/21  1151 08/06/21  0928   * 138  140 149*   K 4.1 3.5  3.5 3.2*   CL 99 106  108 113*   CO2 24 25  27 30   BUN 13 14  14 18   CREA 1.19* 1.12*  1.23* 1.56*   * 125*  131* 165*   CA 8.5 8.1*  8.2* 9.1   MG  --  1.9  --    PHOS 2.5* 2.4*  --      Recent Labs     08/08/21  1245 08/07/21  1151 08/06/21  0928   ALT  --  11* 13   AP  --  132* 158*   TBILI  --  0.5 0.5   TP  --  6.9 8.0   ALB 3.3* 2.9*  3.0* 3.5   GLOB  --  3.9 4.5*     Recent Labs     08/08/21  1245   INR 1.1   PTP 14.4      No results for input(s): FE, TIBC, PSAT, FERR in the last 72 hours. No results found for: FOL, RBCF   No results for input(s): PH, PCO2, PO2 in the last 72 hours. No results for input(s): CPK, CKNDX, TROIQ in the last 72 hours.     No lab exists for component: CPKMB  No results found for: CHOL, CHOLX, CHLST, CHOLV, HDL, HDLP, LDL, LDLC, DLDLP, TGLX, TRIGL, TRIGP, CHHD, CHHDX  Lab Results   Component Value Date/Time    Glucose (POC) 104 08/08/2021 01:57 PM    Glucose (POC) 147 (H) 04/20/2021 08:23 PM    Glucose (POC) 102 (H) 04/20/2021 03:46 PM    Glucose (POC) 224 (H) 10/28/2020 11:42 AM    Glucose (POC) 106 (H) 10/28/2020 08:23 AM     Lab Results   Component Value Date/Time    Color Yellow/Straw 08/05/2021 05:20 AM Appearance Clear 08/05/2021 05:20 AM    Specific gravity 1.008 08/05/2021 05:20 AM    Specific gravity 1.012 11/30/2013 04:50 AM    pH (UA) 7.0 08/05/2021 05:20 AM    Protein Negative 08/05/2021 05:20 AM    Glucose Negative 08/05/2021 05:20 AM    Ketone Negative 08/05/2021 05:20 AM    Bilirubin Negative 08/05/2021 05:20 AM    Urobilinogen 0.1 08/05/2021 05:20 AM    Nitrites Negative 08/05/2021 05:20 AM    Leukocyte Esterase Negative 08/05/2021 05:20 AM    Epithelial cells FEW 11/30/2013 04:50 AM    Bacteria Negative 08/05/2021 05:20 AM    WBC 0-4 08/05/2021 05:20 AM    RBC 0-5 08/05/2021 05:20 AM         Assessment:     1. Hypernatremia  2. Seizure disorder  3.  Leg edema  4.  Diabetes insipidus  5.  History of DVT  6.  Acute kidney injury  7.  Dehydration  8. rectal bleed        Plan:   Hold D5 water at 75 cc/h, as hypernatremia has resolved  Consult GI (Dr. Dahlia Smith) for rectal bleed  Continue Protonix IV 40mg daily  Hold Xarelto per rectal bleed  Continue to monitor Hgb levels due to rectal bleed  Monitor sodium level and renal function  Nephrology consult   Continue Neurology consult per seizures in the hopsital  Continue Keppra 1250mg IV BID for seizures  Await EEG results  Seizures precaution  Fall precaution  We will do bladder ultrasound for urinary retention          Current Facility-Administered Medications:     0.9% sodium chloride infusion 250 mL, 250 mL, IntraVENous, PRN, Randall Rader MD    levETIRAcetam (KEPPRA) 1,250 mg in 0.9% sodium chloride 100 mL IVPB, 1.25 g, IntraVENous, Q12H, Randall Hernandes MD, 1,250 mg at 08/08/21 2322    LORazepam (ATIVAN) injection 1 mg, 1 mg, IntraVENous, Q4H PRN, Randall Rader MD    pantoprazole (PROTONIX) 40 mg in 0.9% sodium chloride 10 mL injection, 40 mg, IntraVENous, DAILY, Randall Hernandes MD, 40 mg at 08/09/21 0041    potassium chloride (KLOR-CON) packet for solution 40 mEq, 40 mEq, Oral, DAILY, Rito Lizama MD, 40 mEq at 08/08/21 0910   amLODIPine (NORVASC) tablet 10 mg, 10 mg, Oral, DAILY, Ehsan Dailey MD, 10 mg at 08/08/21 0723    [Held by provider] rivaroxaban (XARELTO) tablet 20 mg, 20 mg, Oral, DAILY WITH DINNER, Nohemy Dailey MD, 20 mg at 08/07/21 1601    calcium carbonate (TUMS) chewable tablet 1,000 mg [elemental], 1,000 mg, Oral, Q6H PRN, Nohemy Dailey MD    desmopressin (DDAVP) tablet 200 mcg, 0.2 mg, Oral, TID, Nohemy Dailey MD, 200 mcg at 08/09/21 0043    lamoTRIgine (LaMICtal) tablet 100 mg, 100 mg, Oral, BID, Ehsan Dailey MD, 100 mg at 08/08/21 2322    acetaminophen (TYLENOL) tablet 650 mg, 650 mg, Oral, Q6H PRN, 650 mg at 08/05/21 2330 **OR** acetaminophen (TYLENOL) suppository 650 mg, 650 mg, Rectal, Q6H PRN, Nohemy Dailey MD    polyethylene glycol (MIRALAX) packet 17 g, 17 g, Oral, DAILY PRN, Nohemy Dailey MD    ondansetron (ZOFRAN ODT) tablet 4 mg, 4 mg, Oral, Q8H PRN **OR** ondansetron (ZOFRAN) injection 4 mg, 4 mg, IntraVENous, Q6H PRN, Ehsan Dailey MD    ferrous sulfate 300 mg (60 mg iron)/5 mL oral syrup 300 mg, 300 mg, Oral, BID WITH MEALS, Ehsan Dailey MD, 300 mg at 08/08/21 1618                                                                                              *ATTENTION:  This note has been created by a medical student for educational purposes only. Please do not refer to the content of this note for clinical decision-making, billing, or other purposes. Please see attending physicians note to obtain clinical information on this patient. *

## 2021-08-09 NOTE — DISCHARGE INSTRUCTIONS
Discharge Instructions       PATIENT ID: Bahman Quiñones  MRN: [id number]   YOB: 1950    DATE OF ADMISSION: 8/5/2021 12:19 AM    DATE OF DISCHARGE: 8/9/2021    PRIMARY CARE PROVIDER: Thai Ashford MD     ATTENDING PHYSICIAN: Thai Ashford MD  DISCHARGING PROVIDER: Sen Thacker MD    To contact this individual call [phone] and ask the  to page. If unavailable ask to be transferred the Adult Hospitalist Department. DISCHARGE DIAGNOSES hypernatremia and seizure    CONSULTATIONS: IP CONSULT TO NEPHROLOGY  IP CONSULT TO GASTROENTEROLOGY  IP CONSULT TO NEUROLOGY    PROCEDURES/SURGERIES: * No surgery found *    PENDING TEST RESULTS:   At the time of discharge the following test results are still pending: None    FOLLOW UP APPOINTMENTS:   Follow-up Information     Follow up With Specialties Details Why Sri Buckner MD Family Medicine In 1 week  [address] [facility] [address] [phone]             ADDITIONAL CARE RECOMMENDATIONS: none    DIET: Regular Diet  Oral Nutritional Supplements: {RDSUPPLEMENTLIST:20094} {RDSUPPFREQUENCY:20080}     ACTIVITY: Activity as tolerated    WOUND CARE: none    EQUIPMENT needed: none      DISCHARGE MEDICATIONS:   See Medication Reconciliation Form    · It is important that you take the medication exactly as they are prescribed. · Keep your medication in the bottles provided by the pharmacist and keep a list of the medication names, dosages, and times to be taken in your wallet. · Do not take other medications without consulting your doctor. NOTIFY YOUR PHYSICIAN FOR ANY OF THE FOLLOWING:   Fever over 101 degrees for 24 hours. Chest pain, shortness of breath, fever, chills, nausea, vomiting, diarrhea, change in mentation, falling, weakness, bleeding. Severe pain or pain not relieved by medications. Or, any other signs or symptoms that you may have questions about.       DISPOSITION:    Home With: OT  PT  [place]  RN       SNF/Inpatient Rehab/LTAC    Independent/assisted living    Hospice    Other:         PROBLEM LIST Updated:  Yes yes       Signed:   Lin Bamberger, MD  8/9/2021  10:06 AM     Discharge Instructions       PATIENT ID: Josette Landaverde  MRN: [id number]   YOB: 1950    DATE OF ADMISSION: 8/5/2021 12:19 AM    DATE OF DISCHARGE: 8/9/2021    PRIMARY CARE PROVIDER: Maira Paul MD     ATTENDING PHYSICIAN: Maira Paul MD  DISCHARGING PROVIDER: Lin Bamberger, MD    To contact this individual call [phone] and ask the  to page. If unavailable ask to be transferred the Adult Hospitalist Department. DISCHARGE DIAGNOSES ***    CONSULTATIONS: IP CONSULT TO NEPHROLOGY  IP CONSULT TO GASTROENTEROLOGY  IP CONSULT TO NEUROLOGY    PROCEDURES/SURGERIES: * No surgery found *    PENDING TEST RESULTS:   At the time of discharge the following test results are still pending: ***    FOLLOW UP APPOINTMENTS:   Follow-up Information     Follow up With Specialties Details Why Fausto Mora MD Family Medicine In 1 week  [address] [place], [place] [id number] [phone]             ADDITIONAL CARE RECOMMENDATIONS: ***    DIET: {diet:15903}  Oral Nutritional Supplements: {RDSUPPLEMENTLIST:20094} {RDSUPPFREQUENCY:20080}     ACTIVITY: {discharge activity:19708}    WOUND CARE: ***    EQUIPMENT needed: ***      DISCHARGE MEDICATIONS:   See Medication Reconciliation Form    · It is important that you take the medication exactly as they are prescribed. · Keep your medication in the bottles provided by the pharmacist and keep a list of the medication names, dosages, and times to be taken in your wallet. · Do not take other medications without consulting your doctor. NOTIFY YOUR PHYSICIAN FOR ANY OF THE FOLLOWING:   Fever over 101 degrees for 24 hours.    Chest pain, shortness of breath, fever, chills, nausea, vomiting, diarrhea, change in mentation, falling, weakness, bleeding. Severe pain or pain not relieved by medications. Or, any other signs or symptoms that you may have questions about.       DISPOSITION:    Home With:   OT  PT  HH  RN       SNF/Inpatient Rehab/LTAC    Independent/assisted living    Hospice    Other:         PROBLEM LIST Updated:  Yes ***       Signed:   Alexandrea Bowman MD  8/9/2021  10:06 AM

## 2021-08-09 NOTE — PROGRESS NOTES
NEURO PROGRESS NOTE        SUBJECTIVE:   Recurrent seizures  Other medical problems      EXAM:  Somnolent but arousable  Responds verbally minimally  No seizures reported by medical staff      ASSESSMENT/PLAN:  Continue current antiepileptic regimen    ALLERGIES:    No Known Allergies    MEDS:      Current Facility-Administered Medications:     0.9% sodium chloride infusion 250 mL, 250 mL, IntraVENous, PRN, Randall Larios MD    levETIRAcetam (KEPPRA) 1,250 mg in 0.9% sodium chloride 100 mL IVPB, 1.25 g, IntraVENous, Q12H, Randall Hernandes MD, 1,250 mg at 08/08/21 2322    LORazepam (ATIVAN) injection 1 mg, 1 mg, IntraVENous, Q4H PRN, Randall Larios MD    pantoprazole (PROTONIX) 40 mg in 0.9% sodium chloride 10 mL injection, 40 mg, IntraVENous, DAILY, Randall Hernandes MD, 40 mg at 08/09/21 0041    potassium chloride (KLOR-CON) packet for solution 40 mEq, 40 mEq, Oral, DAILY, Joesph Luo MD, 40 mEq at 08/08/21 0936    amLODIPine (NORVASC) tablet 10 mg, 10 mg, Oral, DAILY, Ehsan Dailey MD, 10 mg at 08/08/21 1353    [Held by provider] rivaroxaban (XARELTO) tablet 20 mg, 20 mg, Oral, DAILY WITH DINNER, Ehsan Dailey MD, 20 mg at 08/07/21 1601    calcium carbonate (TUMS) chewable tablet 1,000 mg [elemental], 1,000 mg, Oral, Q6H PRN, Carl Dailey MD    desmopressin (DDAVP) tablet 200 mcg, 0.2 mg, Oral, TID, Carl Dailey MD, 200 mcg at 08/09/21 0043    lamoTRIgine (LaMICtal) tablet 100 mg, 100 mg, Oral, BID, Ehsan Dailey MD, 100 mg at 08/08/21 2322    acetaminophen (TYLENOL) tablet 650 mg, 650 mg, Oral, Q6H PRN, 650 mg at 08/05/21 2330 **OR** acetaminophen (TYLENOL) suppository 650 mg, 650 mg, Rectal, Q6H PRN, Ehsan Dailey MD    polyethylene glycol (MIRALAX) packet 17 g, 17 g, Oral, DAILY PRN, Ehsan Dailey MD    ondansetron (ZOFRAN ODT) tablet 4 mg, 4 mg, Oral, Q8H PRN **OR** ondansetron (ZOFRAN) injection 4 mg, 4 mg, IntraVENous, Q6H PRN, Fabiano Crouch MD  [facility] ferrous sulfate 300 mg (60 mg iron)/5 mL oral syrup 300 mg, 300 mg, Oral, BID WITH MEALS, Ehsan Dailey MD, 300 mg at 08/08/21 1618    LABS:  Recent Results (from the past 24 hour(s))   RENAL FUNCTION PANEL    Collection Time: 08/08/21 12:45 PM   Result Value Ref Range    Sodium 129 (L) 136 - 145 mmol/L    Potassium 4.1 3.5 - 5.1 mmol/L    Chloride 99 97 - 108 mmol/L    CO2 24 21 - 32 mmol/L    Anion gap 6 5 - 15 mmol/L    Glucose 110 (H) 65 - 100 mg/dL    BUN 13 6 - 20 mg/dL    Creatinine 1.19 (H) 0.55 - 1.02 mg/dL    BUN/Creatinine ratio 11 (L) 12 - 20      GFR est AA 54 (L) >60 ml/min/1.73m2    GFR est non-AA 45 (L) >60 ml/min/1.73m2    Calcium 8.5 8.5 - 10.1 mg/dL    Phosphorus 2.5 (L) 2.6 - 4.7 mg/dL    Albumin 3.3 (L) 3.5 - 5.0 g/dL   CBC WITH AUTOMATED DIFF    Collection Time: 08/08/21 12:45 PM   Result Value Ref Range    WBC 4.5 3.6 - 11.0 K/uL    RBC 5.13 3.80 - 5.20 M/uL    HGB 15.4 11.5 - 16.0 g/dL    HCT 48.3 (H) 35.0 - 47.0 %    MCV 94.2 80.0 - 99.0 FL    MCH 30.0 26.0 - 34.0 PG    MCHC 31.9 30.0 - 36.5 g/dL    RDW 14.2 11.5 - 14.5 %    PLATELET 603 834 - 673 K/uL    MPV 9.9 8.9 - 12.9 FL    NRBC 0.0 0.0  WBC    ABSOLUTE NRBC 0.00 0.00 - 0.01 K/uL    NEUTROPHILS 49 32 - 75 %    LYMPHOCYTES 36 12 - 49 %    MONOCYTES 11 5 - 13 %    EOSINOPHILS 3 0 - 7 %    BASOPHILS 1 0 - 1 %    IMMATURE GRANULOCYTES 0 0 - 0.5 %    ABS. NEUTROPHILS 2.2 1.8 - 8.0 K/UL    ABS. LYMPHOCYTES 1.6 0.8 - 3.5 K/UL    ABS. MONOCYTES 0.5 0.0 - 1.0 K/UL    ABS. EOSINOPHILS 0.1 0.0 - 0.4 K/UL    ABS. BASOPHILS 0.0 0.0 - 0.1 K/UL    ABS. IMM.  GRANS. 0.0 0.00 - 0.04 K/UL    DF AUTOMATED     PROTHROMBIN TIME + INR    Collection Time: 08/08/21 12:45 PM   Result Value Ref Range    Prothrombin time 14.4 11.9 - 14.7 sec    INR 1.1 0.9 - 1.1     TYPE & SCREEN    Collection Time: 08/08/21 12:45 PM   Result Value Ref Range    Crossmatch Expiration 08/11/2021,2359     ABO/Rh(D) AB Positive     Antibody screen Negative    GLUCOSE, POC Collection Time: 08/08/21  1:57 PM   Result Value Ref Range    Glucose (POC) 104 65 - 117 mg/dL    Performed by Riana Narvaez    HGB & HCT    Collection Time: 08/08/21  3:30 PM   Result Value Ref Range    HGB 14.3 11.5 - 16.0 g/dL    HCT 44.0 35.0 - 47.0 %   GLUCOSE, POC    Collection Time: 08/08/21  7:20 PM   Result Value Ref Range    Glucose (POC) 85 65 - 117 mg/dL    Performed by Ellie Haynes    METABOLIC PANEL, BASIC    Collection Time: 08/09/21  7:58 AM   Result Value Ref Range    Sodium 130 (L) 136 - 145 mmol/L    Potassium 3.2 (L) 3.5 - 5.1 mmol/L    Chloride 99 97 - 108 mmol/L    CO2 24 21 - 32 mmol/L    Anion gap 7 5 - 15 mmol/L    Glucose 83 65 - 100 mg/dL    BUN 11 6 - 20 mg/dL    Creatinine 0.86 0.55 - 1.02 mg/dL    BUN/Creatinine ratio 13 12 - 20      GFR est AA >60 >60 ml/min/1.73m2    GFR est non-AA >60 >60 ml/min/1.73m2    Calcium 8.8 8.5 - 10.1 mg/dL   HGB & HCT    Collection Time: 08/09/21  7:58 AM   Result Value Ref Range    HGB 13.3 11.5 - 16.0 g/dL    HCT 40.1 35.0 - 47.0 %       Visit Vitals  /70 (BP 1 Location: Right upper arm, BP Patient Position: At rest)   Pulse 73   Temp 97.6 °F (36.4 °C)   Resp 17   Ht 5' 6\" (1.676 m)   Wt 72.7 kg (160 lb 4.4 oz)   SpO2 92%   BMI 25.87 kg/m²       Imaging:  US RETROPERITONEUM COMP   Final Result   Normal exam

## 2021-08-09 NOTE — PROGRESS NOTES
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 PLAN:   Replace K . This should assist with correction of hyponatremia. Follow lytes  Avoid NSAIDs     Subjective/Interval History:       F/U Hyponatremia --> 21      No new complaints were offered. Objective:   Vital Signs:    Visit Vitals  /76 (BP 1 Location: Right upper arm, BP Patient Position: At rest)   Pulse 60   Temp 97.4 °F (36.3 °C)   Resp 12   Ht 5' 6\" (1.676 m)   Wt 72.7 kg (160 lb 4.4 oz)   SpO2 94%   BMI 25.87 kg/m²       O2 Device: None (Room air)       Temp (24hrs), Av.7 °F (36.5 °C), Min:97.4 °F (36.3 °C), Max:98.1 °F (36.7 °C)       Intake/Output:   Last shift:      No intake/output data recorded. Last 3 shifts:  1901 -  0700  In: 998 [P.O.:360]  Out: -     Intake/Output Summary (Last 24 hours) at 2021 1638  Last data filed at 2021 0453  Gross per 24 hour   Intake 878 ml   Output    Net 878 ml        Physical Exam:    Seen in Room 403. A member of her care team was in attendance      General:    Alert, cooperative, no distress, sitting at the edge of the bed. Head:   Normocephalic    Eyes:   No icterus  . Lungs:   Clear to auscultation, No Wheezing , rhonchi or rales. Heart:   No S 3 gallop , No pericardial rub  . Abdomen:   Not distended    Psych:  Appropriate affect    CNS                Awake and responding appropriately.         DATA:  Labs:  Recent Labs     21  0758 21  1245 21  1151   * 129* 138  140   K 3.2* 4.1 3.5  3.5   CL 99 99 106  108   CO2 24 24 25  27   BUN 11 13 14  14   CREA 0.86 1.19* 1.12*  1.23*   CA 8.8 8.5 8.1*  8.2*   ALB  --  3.3* 2.9*  3.0*   PHOS  --  2.5* 2.4*   MG  --   --  1.9     Recent Labs     21  0758 21  153 08/08/21  1245 08/07/21  1151 08/07/21  1151   WBC  --   --  4.5  --  4.5   HGB 13.3 14.3 15.4   < > 14.0   HCT 40.1 44.0 48.3*   < > 43.7   PLT  --   --  151  --  181    < > = values in this interval not displayed. No results for input(s): JOSE RAUL, KU, CLU, CREAU in the last 72 hours.     No lab exists for component: PROU    Total time spent with patient:           Care Plan discussed with:      Jhon Pryor MD

## 2021-08-09 NOTE — CONSULTS
Gastroenterology Consult     Referring Physician: Frankie Rangel MD     Consult Date: 8/9/2021     Subjective:     Chief Complaint: Seizure    History of Present Illness: Stefani Warner is a 66 y.o. female who is seen in consultation for Yeyo red blood in the stool. Ms. Elizabeth gNuyen has a past medical history significant for seizure disorder, diabetes insipidus, DVT,history of craniotomy post aneurysm, dementia, and peg tube placement. She is able to have oral intake. She was on Xarelto prior to admission which has been held for the past 2 days. Her HgB remains stable at 13.3 this morning. She did have a decline from hgB of 15.4 on admission. The patient did exhibit signs of petit seizure x 2 on examination that lasted approximately 10-15 seconds. Neurology is following and she is currently on Keppra and lamictal.. She states she feels \"fine\". She is unable to have MRI of the brain secondary to aneurysmal clips. She does report abdominal and rectal pain with loose stool. She denies ever having a colonoscopy. She reports she does have a history of  Hemorrhoids. She denies nausea and vomiting. She did present to the ED via EMS with abnormal labs and seizure activity. She was alert and oriented on admit to the ED. She was hypernatremic on arrival, Nephrology was consulted for further evaluation and management. She was overcorrected and currently sodium level is 130. She is a resident at [facility] She was given one time dose of potassium for hypokalemia this morning. She is somewhat confused , she states her peg tube was placed this morning. Her peg tube site has skin discoloration around the site but no drainage noted.       Past Medical History:   Diagnosis Date    Anemia     Cerebral aneurysm     with repair    Chronic kidney disease     Dementia ([address]     GI bleed     Hyperlipidemia     Hypertension     Partial diabetes insipidus ([address]     Rectal bleeding     Seizure ([address]     Stroke [facility])      Past Surgical History:   Procedure Laterality Date    HX GI  07/08/2021    PEG replacement    HX OTHER SURGICAL      PEG TUBE      History reviewed. No pertinent family history. Social History     Tobacco Use    Smoking status: Never Smoker    Smokeless tobacco: Never Used   Substance Use Topics    Alcohol use: Not on file      No Known Allergies  Current Facility-Administered Medications   Medication Dose Route Frequency    0.9% sodium chloride infusion 250 mL  250 mL IntraVENous PRN    levETIRAcetam (KEPPRA) 1,250 mg in 0.9% sodium chloride 100 mL IVPB  1.25 g IntraVENous Q12H    LORazepam (ATIVAN) injection 1 mg  1 mg IntraVENous Q4H PRN    pantoprazole (PROTONIX) 40 mg in 0.9% sodium chloride 10 mL injection  40 mg IntraVENous DAILY    potassium chloride (KLOR-CON) packet for solution 40 mEq  40 mEq Oral DAILY    amLODIPine (NORVASC) tablet 10 mg  10 mg Oral DAILY    [Held by provider] rivaroxaban (XARELTO) tablet 20 mg  20 mg Oral DAILY WITH DINNER    calcium carbonate (TUMS) chewable tablet 1,000 mg [elemental]  1,000 mg Oral Q6H PRN    desmopressin (DDAVP) tablet 200 mcg  0.2 mg Oral TID    lamoTRIgine (LaMICtal) tablet 100 mg  100 mg Oral BID    acetaminophen (TYLENOL) tablet 650 mg  650 mg Oral Q6H PRN    Or    acetaminophen (TYLENOL) suppository 650 mg  650 mg Rectal Q6H PRN    polyethylene glycol (MIRALAX) packet 17 g  17 g Oral DAILY PRN    ondansetron (ZOFRAN ODT) tablet 4 mg  4 mg Oral Q8H PRN    Or    ondansetron (ZOFRAN) injection 4 mg  4 mg IntraVENous Q6H PRN    ferrous sulfate 300 mg (60 mg iron)/5 mL oral syrup 300 mg  300 mg Oral BID WITH MEALS        Review of Systems:  A detailed 10 organ review of systems is obtained with pertinent positives as listed in the History of Present Illness and Past Medical History. All others are negative.     Objective:     Physical Exam:  Visit Vitals  /76 (BP 1 Location: Right upper arm, BP Patient Position: At rest)   Pulse 60   Temp 97.4 °F (36.3 °C)   Resp 12   Ht 5' 6\" (1.676 m)   Wt 72.7 kg (160 lb 4.4 oz)   SpO2 94%   BMI 25.87 kg/m²        Skin:  Extremities and face reveal no rashes. No ogden erythema. No telangiectasias on the chest wall. HEENT: Sclerae anicteric. Extra-occular muscles are intact. No oral ulcers. No abnormal pigmentation of the lips. The neck is supple. Cardiovascular: Regular rate and rhythm. Respiratory:  Comfortable breathing with no accessory muscle use. GI:  Abdomen nondistended, soft, and nontender. Normal active bowel sounds. No enlargement of the liver or spleen. No masses palpable. Rectal:  Deferred  Musculoskeletal: Extremities have good range of motion. Neurological:   Confused at times  Psychiatric:  A&O x 3   Lymphatic:  No cervical or supraclavicular adenopathy. Lab/Data Review:  CMP:   Lab Results   Component Value Date/Time     (L) 08/09/2021 07:58 AM    K 3.2 (L) 08/09/2021 07:58 AM    CL 99 08/09/2021 07:58 AM    CO2 24 08/09/2021 07:58 AM    AGAP 7 08/09/2021 07:58 AM    GLU 83 08/09/2021 07:58 AM    BUN 11 08/09/2021 07:58 AM    CREA 0.86 08/09/2021 07:58 AM    GFRAA >60 08/09/2021 07:58 AM    GFRNA >60 08/09/2021 07:58 AM    CA 8.8 08/09/2021 07:58 AM     CBC:   Lab Results   Component Value Date/Time    HGB 13.3 08/09/2021 07:58 AM    HCT 40.1 08/09/2021 07:58 AM         Assessment/Plan:     1. Lauren Tatum red blood from rectum      HgB stable at 13.3      Monitor      Transfuse for hgB less than 8.0      Colonoscopy  as out patient on Friday      Stool for occult pending      Protonix 40 mg daily       Hold Xarelto   2. Hypokalemia      40 meq x 1 given      Repeat K level in the am  3. Hyponatremia       Nephrology input appreciated  4. Seizure Disorder      Neurology input appreciated      Continue Keppra       Continue Lamictal      EEG pending  5. Hx of Cerebral aneurysm      Aneurysm clips in place      Hold Xarelto   6. Hypertension      Continue Home medications  7.  Hx of hemorrhoids      Anusol suppository per rectum BID    Thank you for allowing me to participate in this patients care  Plan discussed with Dr. Lizzie Coker and he approves.

## 2021-08-09 NOTE — DISCHARGE SUMMARY
Discharge Summary       PATIENT ID: Abigail Gerard  MRN: [id number]   YOB: 1950    DATE OF ADMISSION: 8/5/2021 12:19 AM    DATE OF DISCHARGE:   PRIMARY CARE PROVIDER: Simona Hobbs MD     ATTENDING PHYSICIAN: [address] [place] [address]  DISCHARGING PROVIDER: Donavan Dailey      CONSULTATIONS: IP CONSULT TO NEPHROLOGY  IP CONSULT TO GASTROENTEROLOGY  IP CONSULT TO NEUROLOGY    PROCEDURES/SURGERIES: * No surgery found *    ADMITTING DIAGNOSES:    Patient Active Problem List    Diagnosis Date Noted    Seizure ([facility] [address] 08/05/2021    Seizures ([facility] [address] 04/19/2021    Hypernatremia 10/17/2020    UTI (urinary tract infection) 10/17/2020       DISCHARGE DIAGNOSES / PLAN:      1. Hypernatremia  2. Seizure disorder  3. Leg edema  4.  Diabetes insipidus  5.  History of DVT  6.  Acute kidney injury  7.  Dehydration  8. rectal bleed      DISCHARGE MEDICATIONS:  Current Discharge Medication List      START taking these medications    Details   !! levETIRAcetam (Keppra) 250 mg tablet Take 1 Tablet by mouth two (2) times a day. Qty: 60 Tablet, Refills: 0  Start date: 8/9/2021       !! - Potential duplicate medications found. Please discuss with provider. CONTINUE these medications which have NOT CHANGED    Details   !! levETIRAcetam 1,000 mg tablet Take 1 Tab by mouth two (2) times a day. Qty: 60 Tab, Refills: 0      rivaroxaban (Xarelto) 20 mg tab tablet Take 1 Tab by mouth daily (with dinner). Indications: treatment to prevent recurrence of a clot in a deep vein  Qty: 30 Tab, Refills: 0      amLODIPine (NORVASC) 10 mg tablet Take 1 Tab by mouth daily. Qty: 30 Tab, Refills: 0      desmopressin (DDAVP) 0.2 mg tablet Take 0.2 mg by mouth three (3) times daily. calcium carbonate (OS-GIN) 500 mg calcium (1,250 mg) tablet Take 2 Tabs by mouth every six (6) hours as needed for Indigestion. ferrous sulfate 300 mg (60 mg iron)/5 mL syrup Take  by mouth three (3) times daily.       lamoTRIgine (LaMICtal) 100 mg tablet Take 100 mg by mouth two (2) times a day. Indications: convulsive seizures       !! - Potential duplicate medications found. Please discuss with provider. STOP taking these medications       docusate sodium (Colace) 100 mg capsule Comments:   Reason for Stopping:                 NOTIFY YOUR PHYSICIAN FOR ANY OF THE FOLLOWING:   Fever over 101 degrees for 24 hours. Chest pain, shortness of breath, fever, chills, nausea, vomiting, diarrhea, change in mentation, falling, weakness, bleeding. Severe pain or pain not relieved by medications. Or, any other signs or symptoms that you may have questions about. DISPOSITION:  x  Home With:   OT  PT  HH  RN       Long term SNF/Inpatient Rehab    Independent/assisted living    Hospice    Other:       PATIENT CONDITION AT DISCHARGE: Stable      PHYSICAL EXAMINATION AT DISCHARGE:  General:          Alert, cooperative, no distress, appears stated age. HEENT:           Atraumatic, anicteric sclerae, pink conjunctivae                          No oral ulcers, mucosa moist, throat clear, dentition fair  Neck:               Supple, symmetrical  Lungs:             Clear to auscultation bilaterally. No Wheezing or Rhonchi. No rales. Chest wall:      No tenderness  No Accessory muscle use. Heart:              Regular  rhythm,  No  murmur   No edema  Abdomen:        Soft, non-tender. Not distended. Bowel sounds normal  Extremities:     No cyanosis. No clubbing,                            Skin turgor normal, Capillary refill normal  Skin:                Not pale. Not Jaundiced  No rashes   Psych:             Not anxious or agitated.   Neurologic:      Alert, moves all extremities, answers questions appropriately and responds to commands     US RETROPERITONEUM COMP   Final Result   Normal exam              Recent Results (from the past 24 hour(s))   RENAL FUNCTION PANEL    Collection Time: 08/08/21 12:45 PM   Result Value Ref Range    Sodium 129 (L) 136 - 145 mmol/L Potassium 4.1 3.5 - 5.1 mmol/L    Chloride 99 97 - 108 mmol/L    CO2 24 21 - 32 mmol/L    Anion gap 6 5 - 15 mmol/L    Glucose 110 (H) 65 - 100 mg/dL    BUN 13 6 - 20 mg/dL    Creatinine 1.19 (H) 0.55 - 1.02 mg/dL    BUN/Creatinine ratio 11 (L) 12 - 20      GFR est AA 54 (L) >60 ml/min/1.73m2    GFR est non-AA 45 (L) >60 ml/min/1.73m2    Calcium 8.5 8.5 - 10.1 mg/dL    Phosphorus 2.5 (L) 2.6 - 4.7 mg/dL    Albumin 3.3 (L) 3.5 - 5.0 g/dL   CBC WITH AUTOMATED DIFF    Collection Time: 08/08/21 12:45 PM   Result Value Ref Range    WBC 4.5 3.6 - 11.0 K/uL    RBC 5.13 3.80 - 5.20 M/uL    HGB 15.4 11.5 - 16.0 g/dL    HCT 48.3 (H) 35.0 - 47.0 %    MCV 94.2 80.0 - 99.0 FL    MCH 30.0 26.0 - 34.0 PG    MCHC 31.9 30.0 - 36.5 g/dL    RDW 14.2 11.5 - 14.5 %    PLATELET 210 946 - 205 K/uL    MPV 9.9 8.9 - 12.9 FL    NRBC 0.0 0.0  WBC    ABSOLUTE NRBC 0.00 0.00 - 0.01 K/uL    NEUTROPHILS 49 32 - 75 %    LYMPHOCYTES 36 12 - 49 %    MONOCYTES 11 5 - 13 %    EOSINOPHILS 3 0 - 7 %    BASOPHILS 1 0 - 1 %    IMMATURE GRANULOCYTES 0 0 - 0.5 %    ABS. NEUTROPHILS 2.2 1.8 - 8.0 K/UL    ABS. LYMPHOCYTES 1.6 0.8 - 3.5 K/UL    ABS. MONOCYTES 0.5 0.0 - 1.0 K/UL    ABS. EOSINOPHILS 0.1 0.0 - 0.4 K/UL    ABS. BASOPHILS 0.0 0.0 - 0.1 K/UL    ABS. IMM.  GRANS. 0.0 0.00 - 0.04 K/UL    DF AUTOMATED     PROTHROMBIN TIME + INR    Collection Time: 08/08/21 12:45 PM   Result Value Ref Range    Prothrombin time 14.4 11.9 - 14.7 sec    INR 1.1 0.9 - 1.1     TYPE & SCREEN    Collection Time: 08/08/21 12:45 PM   Result Value Ref Range    Crossmatch Expiration 08/11/2021,2359     ABO/Rh(D) AB Positive     Antibody screen Negative    GLUCOSE, POC    Collection Time: 08/08/21  1:57 PM   Result Value Ref Range    Glucose (POC) 104 65 - 117 mg/dL    Performed by Ananda Menezes    HGB & HCT    Collection Time: 08/08/21  3:30 PM   Result Value Ref Range    HGB 14.3 11.5 - 16.0 g/dL    HCT 44.0 35.0 - 47.0 %   GLUCOSE, POC    Collection Time: 08/08/21 7:20 PM   Result Value Ref Range    Glucose (POC) 85 65 - 117 mg/dL    Performed by [employer]    METABOLIC PANEL, BASIC    Collection Time: 08/09/21  7:58 AM   Result Value Ref Range    Sodium 130 (L) 136 - 145 mmol/L    Potassium 3.2 (L) 3.5 - 5.1 mmol/L    Chloride 99 97 - 108 mmol/L    CO2 24 21 - 32 mmol/L    Anion gap 7 5 - 15 mmol/L    Glucose 83 65 - 100 mg/dL    BUN 11 6 - 20 mg/dL    Creatinine 0.86 0.55 - 1.02 mg/dL    BUN/Creatinine ratio 13 12 - 20      GFR est AA >60 >60 ml/min/1.73m2    GFR est non-AA >60 >60 ml/min/1.73m2    Calcium 8.8 8.5 - 10.1 mg/dL   HGB & HCT    Collection Time: 08/09/21  7:58 AM   Result Value Ref Range    HGB 13.3 11.5 - 16.0 g/dL    HCT 40.1 35.0 - 47.0 %          HOSPITAL COURSE:  Patient is a 65 y. o. year old female with past medical history of a seizure disorder, diabetes insipidus, history of DVT presented to the ED the morning of 8/5/21 at [facility] via EMS after possible seizure due to suspected hypernatremia. Patient is alert and oriented in her bed this morning. Over the weekend the patient experienced some hematochezia with her bowel movements. She also had multiple witnessed seizure, Neurology was consulted and the patients Keppra dose was adjusted to 1250mg IV BID. Also per Neurology, patient is scheduled for EEG today.  She otherwise denies chest pain, shortness of breath, fever, chills, dysuria, trouble urinating, nausea, vomiting or constipation      1 in the hospital patient seen by nephrology patient start on D5 water and continue home medications now hyponatremia resolved now hyponatremia potassium was replaced while in the hospital patient has seizure seen by the neurology increase the Keppra to 1250 mg twice daily patient  Patient have questionable rectal bleed patient was on Xarelto was on hold but hemoglobin stable will start back again    Ultrasound abdomen was normal  Patient is alert awake eating drinking well    Discussed with the neurology this morning he is not convinced about the seizures      Patient discharged to nursing home in stable improved condition  Medication reconciliation done time discharge patient 35 minutes 50% time spent counseling and coordination of care    Signed:   Wilfred Condon MD  8/9/2021  10:09 AM

## 2021-08-09 NOTE — PROGRESS NOTES
DC Plan: [employer]        Room# 231 and report to call: [phone]    Cm received a phone call from [facility] this afternoon. Cm received room# 231. Cm called pt's  - Mirella Montero [phone] and notified him of discharge to [facility] today. Cm discussed IMM. Discharge plan of care/case management plan validated with provider's discharge order.

## 2021-08-09 NOTE — PROGRESS NOTES
DC Plan: [address]    RAUL attempted to contact sandy Earl for [address] this morning. Raul left a voice message. __________________________________________________________    Raul spoke with [facility]. Harjeet Restrepo indicated she will let writer know if they will have a bed for pt.

## 2021-08-09 NOTE — PROGRESS NOTES
Discharge plan of care/case management plan validated with provider discharge order. Report called to Barrett Rodriguez RN at [facility]. Patient transported via [address] transport.

## 2021-08-09 NOTE — MED STUDENT NOTES
Progress Note    Patient: Josette Landaverde MRN: [id number]  SSN: [id number]    YOB: 1950  Age: 66 y.o. Sex: female      Admit Date: 8/5/2021    LOS: 4 days     Subjective:     Patient is a 65 y. o. year old female with past medical history of a seizure disorder, diabetes insipidus, history of DVT consulted by GI for 3 days hx of rectal bleeding after admission from the ED. She is responsive and alert to person, place and time. Had 2 episodes of seizures during visit. She states she has had painless loose bloody stool movements for the pass 3 days about 4x a day. She has epigastic and RUQ tenderness with palpation. She denies fever, constipation, shortness of breath, nausea, vomiting . Was able to about to eat breakfast this morning without pain. Objective:     Vitals:    08/09/21 0530 08/09/21 0600 08/09/21 0645 08/09/21 0940   BP: 116/60 119/66 119/70 131/76   Pulse: 65 68 73 60   Resp: 17 18 17 12   Temp: 97.6 °F (36.4 °C) 97.6 °F (36.4 °C) 97.6 °F (36.4 °C) 97.4 °F (36.3 °C)   SpO2: 93% 92% 92% 94%   Weight:       Height:            Intake and Output:  Current Shift: No intake/output data recorded. Last three shifts: 08/07 1901 - 08/09 0700  In: 998 [P.O.:360]  Out: -     Physical Exam:   GENERAL: alert, cooperative, no distress, slowed mentation, appears stated age  EYE: conjunctivae/corneas clear. PERRL, EOM's intact. LUNG: clear to auscultation bilaterally  HEART: S1, S2 normal, no click, no rub  ABDOMEN: soft, non-tender. Bowel sounds normal. No masses,  no organomegaly, abnormal findings:  tenderness mild in the epigastrium and in the RUQ    Lab/Data Review: All lab results for the last 24 hours reviewed. ***HG and HCT within normal range    Assessment:     Active Problems:    Hypernatremia (10/17/2020)      Seizure ([address] (8/5/2021)    1. Hypernatremia  2. Seizure disorder  3. Leg edema  4.  Diabetes insipidus  5.  History of DVT  6.  Acute kidney injury  7.  Dehydration  8. Rectal Bleed     PT has had 3 day hx of painless rectal bleeding that warrants farther study. Plan:     ***Pt will be scheduled for a colonoscopy to investigate source of rectal bleeding. Continue Protonix IV 40mg daily  Hold Xarelto per rectal bleed  Continue to monitor Hgb levels due to rectal bleed  Monitor sodium level and renal function  Seizures precaution  Fall precaution  Await results of bladder ultrasound for urinary retention    Signed By: Malina Kelley     August 9, 2021        *ATTENTION:  This note has been created by a medical student for educational purposes only. Please do not refer to the content of this note for clinical decision-making, billing, or other purposes. Please see attending physicians note to obtain clinical information on this patient. *

## 2021-08-10 LAB
BLD PROD TYP BPU: NORMAL
BLD PROD TYP BPU: NORMAL
BPU ID: NORMAL
BPU ID: NORMAL
STATUS OF UNIT,%ST: NORMAL
STATUS OF UNIT,%ST: NORMAL
TRANSFUSION STATUS PATIENT QL: NORMAL
TRANSFUSION STATUS PATIENT QL: NORMAL
UNIT DIVISION, %UDIV: 0
UNIT DIVISION, %UDIV: 0

## 2021-08-11 LAB — LEVETIRACETAM SERPL-MCNC: 67.1 UG/ML (ref 10–40)

## 2021-08-31 ENCOUNTER — HOSPITAL ENCOUNTER (INPATIENT)
Age: 71
LOS: 2 days | Discharge: NURSING, REHAB OR LONG-TERM CARE | DRG: 395 | End: 2021-09-02
Attending: EMERGENCY MEDICINE | Admitting: FAMILY MEDICINE
Payer: MEDICARE

## 2021-08-31 ENCOUNTER — APPOINTMENT (OUTPATIENT)
Dept: CT IMAGING | Age: 71
DRG: 395 | End: 2021-08-31
Attending: PHYSICIAN ASSISTANT
Payer: MEDICARE

## 2021-08-31 DIAGNOSIS — K57.90 DIVERTICULOSIS: ICD-10-CM

## 2021-08-31 DIAGNOSIS — K92.2 LOWER GI BLEED: Primary | ICD-10-CM

## 2021-08-31 DIAGNOSIS — R53.1 WEAKNESS: ICD-10-CM

## 2021-08-31 DIAGNOSIS — K64.4 EXTERNAL HEMORRHOID: ICD-10-CM

## 2021-08-31 LAB
ABO + RH BLD: NORMAL
ALBUMIN SERPL-MCNC: 3.7 G/DL (ref 3.5–5)
ALBUMIN/GLOB SERPL: 0.9 {RATIO} (ref 1.1–2.2)
ALP SERPL-CCNC: 150 U/L (ref 45–117)
ALT SERPL-CCNC: 13 U/L (ref 12–78)
ANION GAP SERPL CALC-SCNC: 8 MMOL/L (ref 5–15)
AST SERPL W P-5'-P-CCNC: 13 U/L (ref 15–37)
BASOPHILS # BLD: 0 K/UL (ref 0–0.1)
BASOPHILS NFR BLD: 0 % (ref 0–1)
BILIRUB SERPL-MCNC: 0.3 MG/DL (ref 0.2–1)
BLOOD GROUP ANTIBODIES SERPL: NEGATIVE
BUN SERPL-MCNC: 15 MG/DL (ref 6–20)
BUN/CREAT SERPL: 12 (ref 12–20)
CA-I BLD-MCNC: 9.5 MG/DL (ref 8.5–10.1)
CHLORIDE SERPL-SCNC: 106 MMOL/L (ref 97–108)
CO2 SERPL-SCNC: 24 MMOL/L (ref 21–32)
COLLECT DATE STL: 9
COVID-19 RAPID TEST, COVR: NOT DETECTED
CREAT SERPL-MCNC: 1.21 MG/DL (ref 0.55–1.02)
DIFFERENTIAL METHOD BLD: ABNORMAL
EOSINOPHIL # BLD: 0.1 K/UL (ref 0–0.4)
EOSINOPHIL NFR BLD: 2 % (ref 0–7)
ERYTHROCYTE [DISTWIDTH] IN BLOOD BY AUTOMATED COUNT: 15.2 % (ref 11.5–14.5)
GLOBULIN SER CALC-MCNC: 4 G/DL (ref 2–4)
GLUCOSE SERPL-MCNC: 84 MG/DL (ref 65–100)
HCT VFR BLD AUTO: 41.2 % (ref 35–47)
HEMOCCULT SP1 STL QL: POSITIVE
HGB BLD-MCNC: 13.6 G/DL (ref 11.5–16)
IMM GRANULOCYTES # BLD AUTO: 0 K/UL (ref 0–0.04)
IMM GRANULOCYTES NFR BLD AUTO: 0 % (ref 0–0.5)
LIPASE SERPL-CCNC: 135 U/L (ref 73–393)
LYMPHOCYTES # BLD: 2.2 K/UL (ref 0.8–3.5)
LYMPHOCYTES NFR BLD: 48 % (ref 12–49)
MCH RBC QN AUTO: 31.5 PG (ref 26–34)
MCHC RBC AUTO-ENTMCNC: 33 G/DL (ref 30–36.5)
MCV RBC AUTO: 95.4 FL (ref 80–99)
MONOCYTES # BLD: 0.4 K/UL (ref 0–1)
MONOCYTES NFR BLD: 10 % (ref 5–13)
NEUTS SEG # BLD: 1.8 K/UL (ref 1.8–8)
NEUTS SEG NFR BLD: 40 % (ref 32–75)
NRBC # BLD: 0 K/UL (ref 0–0.01)
NRBC BLD-RTO: 0 PER 100 WBC
PLATELET # BLD AUTO: 220 K/UL (ref 150–400)
PMV BLD AUTO: 9.6 FL (ref 8.9–12.9)
POTASSIUM SERPL-SCNC: 4.1 MMOL/L (ref 3.5–5.1)
PROT SERPL-MCNC: 7.7 G/DL (ref 6.4–8.2)
RBC # BLD AUTO: 4.32 M/UL (ref 3.8–5.2)
SODIUM SERPL-SCNC: 138 MMOL/L (ref 136–145)
SPECIMEN EXP DATE BLD: NORMAL
SPECIMEN SOURCE: NORMAL
WBC # BLD AUTO: 4.6 K/UL (ref 3.6–11)

## 2021-08-31 PROCEDURE — 99283 EMERGENCY DEPT VISIT LOW MDM: CPT

## 2021-08-31 PROCEDURE — 74011250636 HC RX REV CODE- 250/636: Performed by: FAMILY MEDICINE

## 2021-08-31 PROCEDURE — 65270000029 HC RM PRIVATE

## 2021-08-31 PROCEDURE — 83690 ASSAY OF LIPASE: CPT

## 2021-08-31 PROCEDURE — 87635 SARS-COV-2 COVID-19 AMP PRB: CPT

## 2021-08-31 PROCEDURE — 83036 HEMOGLOBIN GLYCOSYLATED A1C: CPT

## 2021-08-31 PROCEDURE — 86901 BLOOD TYPING SEROLOGIC RH(D): CPT

## 2021-08-31 PROCEDURE — 36415 COLL VENOUS BLD VENIPUNCTURE: CPT

## 2021-08-31 PROCEDURE — 74177 CT ABD & PELVIS W/CONTRAST: CPT

## 2021-08-31 PROCEDURE — 85025 COMPLETE CBC W/AUTO DIFF WBC: CPT

## 2021-08-31 PROCEDURE — 74011250637 HC RX REV CODE- 250/637: Performed by: FAMILY MEDICINE

## 2021-08-31 PROCEDURE — 80053 COMPREHEN METABOLIC PANEL: CPT

## 2021-08-31 PROCEDURE — 74011000636 HC RX REV CODE- 636: Performed by: PHYSICIAN ASSISTANT

## 2021-08-31 PROCEDURE — 74011250636 HC RX REV CODE- 250/636: Performed by: PHYSICIAN ASSISTANT

## 2021-08-31 PROCEDURE — 82272 OCCULT BLD FECES 1-3 TESTS: CPT

## 2021-08-31 RX ORDER — NYSTATIN 100000 U/G
CREAM TOPICAL 2 TIMES DAILY
COMMUNITY
Start: 2021-08-25

## 2021-08-31 RX ORDER — SODIUM CHLORIDE 9 MG/ML
75 INJECTION, SOLUTION INTRAVENOUS CONTINUOUS
Status: DISCONTINUED | OUTPATIENT
Start: 2021-08-31 | End: 2021-09-02 | Stop reason: HOSPADM

## 2021-08-31 RX ORDER — ACETAMINOPHEN 650 MG/1
650 SUPPOSITORY RECTAL
Status: DISCONTINUED | OUTPATIENT
Start: 2021-08-31 | End: 2021-09-02 | Stop reason: HOSPADM

## 2021-08-31 RX ORDER — ONDANSETRON 2 MG/ML
4 INJECTION INTRAMUSCULAR; INTRAVENOUS
Status: DISCONTINUED | OUTPATIENT
Start: 2021-08-31 | End: 2021-09-02 | Stop reason: HOSPADM

## 2021-08-31 RX ORDER — AMLODIPINE BESYLATE 5 MG/1
10 TABLET ORAL DAILY
Status: DISCONTINUED | OUTPATIENT
Start: 2021-09-01 | End: 2021-09-02 | Stop reason: HOSPADM

## 2021-08-31 RX ORDER — DESMOPRESSIN ACETATE 0.2 MG/1
0.2 TABLET ORAL 3 TIMES DAILY
Status: DISCONTINUED | OUTPATIENT
Start: 2021-08-31 | End: 2021-09-02 | Stop reason: HOSPADM

## 2021-08-31 RX ORDER — ONDANSETRON 4 MG/1
4 TABLET, ORALLY DISINTEGRATING ORAL
Status: DISCONTINUED | OUTPATIENT
Start: 2021-08-31 | End: 2021-09-02 | Stop reason: HOSPADM

## 2021-08-31 RX ORDER — LEVETIRACETAM 100 MG/ML
12.5 SOLUTION ORAL 2 TIMES DAILY
COMMUNITY
Start: 2021-08-09 | End: 2021-09-02

## 2021-08-31 RX ORDER — ACETAMINOPHEN 325 MG/1
650 TABLET ORAL
Status: DISCONTINUED | OUTPATIENT
Start: 2021-08-31 | End: 2021-09-02 | Stop reason: HOSPADM

## 2021-08-31 RX ORDER — INSULIN LISPRO 100 [IU]/ML
INJECTION, SOLUTION INTRAVENOUS; SUBCUTANEOUS
Status: DISCONTINUED | OUTPATIENT
Start: 2021-08-31 | End: 2021-09-02 | Stop reason: HOSPADM

## 2021-08-31 RX ORDER — DOCUSATE SODIUM 100 MG/1
100 CAPSULE, LIQUID FILLED ORAL DAILY
COMMUNITY
End: 2021-09-02

## 2021-08-31 RX ORDER — POLYETHYLENE GLYCOL 3350 17 G/17G
17 POWDER, FOR SOLUTION ORAL DAILY PRN
Status: DISCONTINUED | OUTPATIENT
Start: 2021-08-31 | End: 2021-09-02 | Stop reason: HOSPADM

## 2021-08-31 RX ORDER — FERROUS SULFATE 300 MG/5ML
300 LIQUID (ML) ORAL 3 TIMES DAILY
Status: DISCONTINUED | OUTPATIENT
Start: 2021-08-31 | End: 2021-09-02 | Stop reason: HOSPADM

## 2021-08-31 RX ORDER — LAMOTRIGINE 100 MG/1
100 TABLET ORAL 2 TIMES DAILY
Status: DISCONTINUED | OUTPATIENT
Start: 2021-08-31 | End: 2021-09-02 | Stop reason: HOSPADM

## 2021-08-31 RX ORDER — LEVETIRACETAM 500 MG/1
1000 TABLET ORAL 2 TIMES DAILY
Status: DISCONTINUED | OUTPATIENT
Start: 2021-08-31 | End: 2021-09-02 | Stop reason: HOSPADM

## 2021-08-31 RX ORDER — DEXTROSE 50 % IN WATER (D50W) INTRAVENOUS SYRINGE
25-50 AS NEEDED
Status: DISCONTINUED | OUTPATIENT
Start: 2021-08-31 | End: 2021-09-02 | Stop reason: HOSPADM

## 2021-08-31 RX ORDER — MAGNESIUM SULFATE 100 %
4 CRYSTALS MISCELLANEOUS AS NEEDED
Status: DISCONTINUED | OUTPATIENT
Start: 2021-08-31 | End: 2021-09-02 | Stop reason: HOSPADM

## 2021-08-31 RX ORDER — LEVETIRACETAM 250 MG/1
250 TABLET ORAL 2 TIMES DAILY
Status: DISCONTINUED | OUTPATIENT
Start: 2021-08-31 | End: 2021-09-02 | Stop reason: HOSPADM

## 2021-08-31 RX ORDER — ERGOCALCIFEROL 1.25 MG/1
1 CAPSULE ORAL
COMMUNITY
Start: 2021-08-13

## 2021-08-31 RX ADMIN — IOPAMIDOL 100 ML: 755 INJECTION, SOLUTION INTRAVENOUS at 10:30

## 2021-08-31 RX ADMIN — SODIUM CHLORIDE 75 ML/HR: 9 INJECTION, SOLUTION INTRAVENOUS at 23:58

## 2021-08-31 RX ADMIN — LEVETIRACETAM 250 MG: 250 TABLET, FILM COATED ORAL at 23:55

## 2021-08-31 RX ADMIN — DESMOPRESSIN ACETATE 200 MCG: 0.2 TABLET ORAL at 23:56

## 2021-08-31 RX ADMIN — LAMOTRIGINE 100 MG: 100 TABLET ORAL at 23:56

## 2021-08-31 RX ADMIN — MINERAL SUPPLEMENT IRON 300 MG / 5 ML STRENGTH LIQUID 100 PER BOX UNFLAVORED 300 MG: at 23:56

## 2021-08-31 RX ADMIN — LEVETIRACETAM 1000 MG: 500 TABLET ORAL at 23:56

## 2021-08-31 RX ADMIN — SODIUM CHLORIDE 1000 ML: 9 INJECTION, SOLUTION INTRAVENOUS at 10:15

## 2021-08-31 NOTE — PROGRESS NOTES
8/31/21. PCP is Dr. Apolinar Sena. D/C Plan is to return to [facility] via transportation. Son Daxa Isidro @ [phone]) verbally consented to Choice Letter. Referral sent via [place]. Pt uses w/c & is total care.

## 2021-08-31 NOTE — PROGRESS NOTES
History and Physical    NAME: Jessie Anderson   :  1950   MRN:  [id number]     Date/Time:  2021 12:35 PM    Patient PCP: Anna Romero MD  ______________________________________________________________________             Subjective:     CHIEF COMPLAINT:   RECTAL BLEEDING X 1d      HISTORY OF PRESENT ILLNESS:       Patient is a 66y.o. year old female with a hx of anemia, CVA, dementia, DVT on Xarelto, CKD, seizure, and PEG tube presented from SNF to the ED after 1 day of worsening rectal bleeding. She also endorsed weakness and fatigue. She states she had bright red blood per rectum. She denies constipation, diarrhea, nausea, vomiting, chest pain, shortness of breath. Patient has a history of internal hemorrhoids. She states she recently cancelled a colonoscopy, with her last one being ~20-30 years ago. ED rectal exam showed blooding and external hemorrhoid and fecal occult card positive. CXR: no acute findings  COVID test: negative  CT a/p: No evidence to explain rectal bleeding and no active findings, small fat containing umbilical hernia, minimal sigmoid diverticulosis    Hgb stable 13.6, Hct 41.2. Past Medical History:   Diagnosis Date    Anemia     Cerebral aneurysm     with repair    Chronic kidney disease     [facility] [address]     GI bleed     Hyperlipidemia     Hypertension     Partial diabetes insipidus ([facility] [address]     Rectal bleeding     Seizure ([facility] [address]     Stroke [facility])         Past Surgical History:   Procedure Laterality Date    HX GI  2021    PEG replacement    HX OTHER SURGICAL      PEG TUBE       Social History     Tobacco Use    Smoking status: Never Smoker    Smokeless tobacco: Never Used   Substance Use Topics    Alcohol use: Not on file        No family history on file. No Known Allergies     Prior to Admission medications    Medication Sig Start Date End Date Taking?  Authorizing Provider   levETIRAcetam (Keppra) 250 mg tablet Take 1 Tablet by mouth two (2) times a day. 8/9/21   Tea Dailey MD   levETIRAcetam 1,000 mg tablet Take 1 Tab by mouth two (2) times a day. 4/22/21   Tea Dailey MD   rivaroxaban (Xarelto) 20 mg tab tablet Take 1 Tab by mouth daily (with dinner). Indications: treatment to prevent recurrence of a clot in a deep vein 4/22/21   Tea Dailey MD   amLODIPine (NORVASC) 10 mg tablet Take 1 Tab by mouth daily. 4/23/21   Tea Dailey MD   desmopressin (DDAVP) 0.2 mg tablet Take 0.2 mg by mouth three (3) times daily. Provider, Historical   calcium carbonate (OS-GIN) 500 mg calcium (1,250 mg) tablet Take 2 Tabs by mouth every six (6) hours as needed for Indigestion. Provider, Historical   ferrous sulfate 300 mg (60 mg iron)/5 mL syrup Take  by mouth three (3) times daily. Provider, Historical   lamoTRIgine (LaMICtal) 100 mg tablet Take 100 mg by mouth two (2) times a day. Indications: convulsive seizures    Provider, Historical       No current facility-administered medications for this encounter. Current Outpatient Medications:     levETIRAcetam (Keppra) 250 mg tablet, Take 1 Tablet by mouth two (2) times a day., Disp: 60 Tablet, Rfl: 0    levETIRAcetam 1,000 mg tablet, Take 1 Tab by mouth two (2) times a day., Disp: 60 Tab, Rfl: 0    rivaroxaban (Xarelto) 20 mg tab tablet, Take 1 Tab by mouth daily (with dinner). Indications: treatment to prevent recurrence of a clot in a deep vein, Disp: 30 Tab, Rfl: 0    amLODIPine (NORVASC) 10 mg tablet, Take 1 Tab by mouth daily. , Disp: 30 Tab, Rfl: 0    desmopressin (DDAVP) 0.2 mg tablet, Take 0.2 mg by mouth three (3) times daily. , Disp: , Rfl:     calcium carbonate (OS-GIN) 500 mg calcium (1,250 mg) tablet, Take 2 Tabs by mouth every six (6) hours as needed for Indigestion. , Disp: , Rfl:     ferrous sulfate 300 mg (60 mg iron)/5 mL syrup, Take  by mouth three (3) times daily. , Disp: , Rfl:     lamoTRIgine (LaMICtal) 100 mg tablet, Take 100 mg by mouth two (2) times a day. Indications: convulsive seizures, Disp: , Rfl:     LAB DATA REVIEWED:    Recent Results (from the past 24 hour(s))   OCCULT BLOOD, STOOL    Collection Time: 08/31/21  9:00 AM   Result Value Ref Range    Occult Blood,day 1 Positive (A) Negative      Day 1 date: 9     CBC WITH AUTOMATED DIFF    Collection Time: 08/31/21  9:15 AM   Result Value Ref Range    WBC 4.6 3.6 - 11.0 K/uL    RBC 4.32 3.80 - 5.20 M/uL    HGB 13.6 11.5 - 16.0 g/dL    HCT 41.2 35.0 - 47.0 %    MCV 95.4 80.0 - 99.0 FL    MCH 31.5 26.0 - 34.0 PG    MCHC 33.0 30.0 - 36.5 g/dL    RDW 15.2 (H) 11.5 - 14.5 %    PLATELET 534 706 - 040 K/uL    MPV 9.6 8.9 - 12.9 FL    NRBC 0.0 0.0  WBC    ABSOLUTE NRBC 0.00 0.00 - 0.01 K/uL    NEUTROPHILS 40 32 - 75 %    LYMPHOCYTES 48 12 - 49 %    MONOCYTES 10 5 - 13 %    EOSINOPHILS 2 0 - 7 %    BASOPHILS 0 0 - 1 %    IMMATURE GRANULOCYTES 0 0 - 0.5 %    ABS. NEUTROPHILS 1.8 1.8 - 8.0 K/UL    ABS. LYMPHOCYTES 2.2 0.8 - 3.5 K/UL    ABS. MONOCYTES 0.4 0.0 - 1.0 K/UL    ABS. EOSINOPHILS 0.1 0.0 - 0.4 K/UL    ABS. BASOPHILS 0.0 0.0 - 0.1 K/UL    ABS. IMM. GRANS. 0.0 0.00 - 0.04 K/UL    DF AUTOMATED     METABOLIC PANEL, COMPREHENSIVE    Collection Time: 08/31/21  9:15 AM   Result Value Ref Range    Sodium 138 136 - 145 mmol/L    Potassium 4.1 3.5 - 5.1 mmol/L    Chloride 106 97 - 108 mmol/L    CO2 24 21 - 32 mmol/L    Anion gap 8 5 - 15 mmol/L    Glucose 84 65 - 100 mg/dL    BUN 15 6 - 20 mg/dL    Creatinine 1.21 (H) 0.55 - 1.02 mg/dL    BUN/Creatinine ratio 12 12 - 20      GFR est AA 53 (L) >60 ml/min/1.73m2    GFR est non-AA 44 (L) >60 ml/min/1.73m2    Calcium 9.5 8.5 - 10.1 mg/dL    Bilirubin, total 0.3 0.2 - 1.0 mg/dL    AST (SGOT) 13 (L) 15 - 37 U/L    ALT (SGPT) 13 12 - 78 U/L    Alk.  phosphatase 150 (H) 45 - 117 U/L    Protein, total 7.7 6.4 - 8.2 g/dL    Albumin 3.7 3.5 - 5.0 g/dL    Globulin 4.0 2.0 - 4.0 g/dL    A-G Ratio 0.9 (L) 1.1 - 2.2     LIPASE    Collection Time: 08/31/21  9:15 AM   Result Value Ref Range    Lipase 135 73 - 393 U/L   TYPE & SCREEN    Collection Time: 08/31/21 10:46 AM   Result Value Ref Range    Crossmatch Expiration 09/03/2021,2359     ABO/Rh(D) AB Positive     Antibody screen Negative        XR Results (most recent):  Results from Hospital Encounter encounter on 04/19/21    XR CHEST SNGL V    Narrative  Multiple seizures. Comparison chest x-ray 4/24/2020. FINDINGS: Frontal single view of the chest. Normal heart size. Calcified aorta. No vascular congestion or pulmonary edema. The lungs are similarly inflated. Left lower lung scarring. No infiltrate, effusion, pneumothorax. No free air  under diaphragm. Bony structures intact. Impression  No acute findings. CT ABD PELV W CONT   Final Result   No evidence to explain rectal bleeding and no active findings           Review of Systems:  Constitutional: Negative for chills and fever. HENT: Negative. Eyes: Negative. Respiratory: Negative. Cardiovascular: Negative. Gastrointestinal: No abdominal pain. Bloody stools. Skin: Negative. Neurological: Negative. Objective:   VITALS:    Visit Vitals  BP (!) 153/83   Pulse 68   Temp 97.5 °F (36.4 °C)   Resp 18   SpO2 99%       Physical Exam:   Constitutional: pt is oriented to person, place, and time. Head: Normocephalic and atraumatic. Eyes: Pupils are equal, round, and reactive to light. EOM are normal.   Cardiovascular: Normal rate, regular rhythm and normal heart sounds. Pulmonary/Chest: Breath sounds normal. No wheezes. No rales. Exhibits no tenderness. Abdominal: Soft. Bowel sounds are normal. There is mild abdominal tenderness diffusely across lower abdomen. There is no rebound and no guarding. PEG tube in place, clean, dry, mild amount of aguilar bleeding per rectum, external hemorrhoids noted  Genitourinary: + hutchins  Musculoskeletal: Normal range of motion. Neurological: pt is alert and oriented to person, place, and time. Alert.  No focal neurologic deficit      ASSESSMENT :    Lower GI bleed  CT a/p no acute findings   Fecal occult test positive  Consult GI  Monitor H&H    External Hemorrhoid            Diverticulosis          CT a/p showed mild sigmoid diverticulosis without wall thickening or fat stranding    Weakness    Hx HTN    Hx Seizure disorder  CXR negative    Hx PEG tube    Hx dementia    Hx CVA     Hx DVT on eliquis             Hx CKD and anemia of chronic disease 2/2 CKD           Cr           BUN           H&H 13.6/41.2       PLAN:  Start NS IVF  Continue Keppra 1,000 mg PO BID  Continue Xarelto 20 mg PO every day  Continue Amlodipine, 10 mg, PO every day  Continue DDAVP, 0.2 mg PO TID  Continue Ca carbonate, 2 tabs, PO Q6H prn indigestion  Continue ferrous sulfate 300 mg/5 mL syrup PO TID  Continue lamotrigine, 100 mg, PO BID  Start protonix 40 mg PO QD  NPO    Consult GI  Monitor H&H  ________________________________________________________________________    Signed: Steven Echols

## 2021-08-31 NOTE — ACP (ADVANCE CARE PLANNING)
Advance Care Planning   Healthcare Decision Maker:       Primary Decision Maker:  Josse Shoemaker [facility] - [phone]

## 2021-08-31 NOTE — ED NOTES
TRANSFER - OUT REPORT:    Verbal report given to [employer] (name) on Jessie Anderson  being transferred to @e (unit) for routine progression of care       Report consisted of patients Situation, Background, Assessment and   Recommendations(SBAR). Information from the following report(s) SBAR and ED Summary was reviewed with the receiving nurse. Lines:   Peripheral IV 08/31/21 Left Antecubital (Active)        Opportunity for questions and clarification was provided.       Patient transported with:   Monitor  Tech

## 2021-08-31 NOTE — CONSULTS
Gastroenterology Consult     Referring Physician: Diana Cristobal MD     Consult Date: 8/31/2021     Subjective:     Chief Complaint: Rectal bleeding and abdominal pain     History of Present Illness: Pino Jose is a 66 y.o. female who is seen in consultation for lower GI bleed - rectal bleeding. Patient was admitted to the hospital from [facility] with the c/o rectal bleeding and abdominal pain. She has a past medical history of CVA, dementia, DVT, on Xarelto, PEG tube placement. Staff noticed rectal bleeding that started yesterday. Accompanying symptoms are generalized weakness and fatigue. Recent cancelled colonoscopy for unknown reason. Fecal occult positive. In the ER, hgb appears stable 13.6, creatinine elevated at 1.21.    CT scan of abdomen No evidence to explain rectal bleeding and no active findings. On exam, patient is awake and alert. Complains of mild abdominal pain. Denies further rectal bleeding today. She has a PEG tube intact. Pitting edema of bilateral lower extremities. Past Medical History:   Diagnosis Date    Anemia     Cerebral aneurysm     with repair    Chronic kidney disease     [facility] [address]     GI bleed     Hyperlipidemia     Hypertension     Partial diabetes insipidus ([facility] [address]     Rectal bleeding     Seizure ([facility] [address]     Stroke [facility])      Past Surgical History:   Procedure Laterality Date    HX GI  07/08/2021    PEG replacement    HX OTHER SURGICAL      PEG TUBE      No family history on file. Social History     Tobacco Use    Smoking status: Never Smoker    Smokeless tobacco: Never Used   Substance Use Topics    Alcohol use: Not on file      No Known Allergies  No current facility-administered medications for this encounter. Current Outpatient Medications   Medication Sig    levETIRAcetam (Keppra) 250 mg tablet Take 1 Tablet by mouth two (2) times a day.     levETIRAcetam 1,000 mg tablet Take 1 Tab by mouth two (2) times a day.  rivaroxaban (Xarelto) 20 mg tab tablet Take 1 Tab by mouth daily (with dinner). Indications: treatment to prevent recurrence of a clot in a deep vein    amLODIPine (NORVASC) 10 mg tablet Take 1 Tab by mouth daily.  desmopressin (DDAVP) 0.2 mg tablet Take 0.2 mg by mouth three (3) times daily.  calcium carbonate (OS-GIN) 500 mg calcium (1,250 mg) tablet Take 2 Tabs by mouth every six (6) hours as needed for Indigestion.  ferrous sulfate 300 mg (60 mg iron)/5 mL syrup Take  by mouth three (3) times daily.  lamoTRIgine (LaMICtal) 100 mg tablet Take 100 mg by mouth two (2) times a day. Indications: convulsive seizures        Review of Systems:  A detailed 10 organ review of systems is obtained with pertinent positives as listed in the History of Present Illness and Past Medical History. All others are negative. Objective:     Physical Exam:  Visit Vitals  BP (!) 153/83   Pulse 68   Temp 97.5 °F (36.4 °C)   Resp 18   SpO2 99%        Skin:  Extremities and face reveal no rashes. No ogden erythema. No telangiectasias on the chest wall. HEENT: Sclerae anicteric. Extra-occular muscles are intact. No oral ulcers. No abnormal pigmentation of the lips. The neck is supple. Cardiovascular: Regular rate and rhythm. No murmurs, gallops, or rubs. PMI nondisplaced. Carotids without bruits. Respiratory:  Comfortable breathing with no accessory muscle use. Clear breath sounds with no wheezes, rales, or rhonchi. GI:  Abdomen nondistended, soft, and nontender. Normal active bowel sounds. No enlargement of the liver or spleen. No masses palpable. PEG tube in place. Rectal:  Deferred  Musculoskeletal:  pitting edema of the lower legs. Extremities have good range of motion. No costovertebral tenderness. Neurological:  Gross memory appears intact. Patient is alert and oriented. Psychiatric:  Mood appears appropriate with judgement intact.   Lymphatic:  No cervical or supraclavicular adenopathy. Lab/Data Review:  Recent Results (from the past 24 hour(s))   OCCULT BLOOD, STOOL    Collection Time: 08/31/21  9:00 AM   Result Value Ref Range    Occult Blood,day 1 Positive (A) Negative      Day 1 date: 9     CBC WITH AUTOMATED DIFF    Collection Time: 08/31/21  9:15 AM   Result Value Ref Range    WBC 4.6 3.6 - 11.0 K/uL    RBC 4.32 3.80 - 5.20 M/uL    HGB 13.6 11.5 - 16.0 g/dL    HCT 41.2 35.0 - 47.0 %    MCV 95.4 80.0 - 99.0 FL    MCH 31.5 26.0 - 34.0 PG    MCHC 33.0 30.0 - 36.5 g/dL    RDW 15.2 (H) 11.5 - 14.5 %    PLATELET 744 601 - 908 K/uL    MPV 9.6 8.9 - 12.9 FL    NRBC 0.0 0.0  WBC    ABSOLUTE NRBC 0.00 0.00 - 0.01 K/uL    NEUTROPHILS 40 32 - 75 %    LYMPHOCYTES 48 12 - 49 %    MONOCYTES 10 5 - 13 %    EOSINOPHILS 2 0 - 7 %    BASOPHILS 0 0 - 1 %    IMMATURE GRANULOCYTES 0 0 - 0.5 %    ABS. NEUTROPHILS 1.8 1.8 - 8.0 K/UL    ABS. LYMPHOCYTES 2.2 0.8 - 3.5 K/UL    ABS. MONOCYTES 0.4 0.0 - 1.0 K/UL    ABS. EOSINOPHILS 0.1 0.0 - 0.4 K/UL    ABS. BASOPHILS 0.0 0.0 - 0.1 K/UL    ABS. IMM. GRANS. 0.0 0.00 - 0.04 K/UL    DF AUTOMATED     METABOLIC PANEL, COMPREHENSIVE    Collection Time: 08/31/21  9:15 AM   Result Value Ref Range    Sodium 138 136 - 145 mmol/L    Potassium 4.1 3.5 - 5.1 mmol/L    Chloride 106 97 - 108 mmol/L    CO2 24 21 - 32 mmol/L    Anion gap 8 5 - 15 mmol/L    Glucose 84 65 - 100 mg/dL    BUN 15 6 - 20 mg/dL    Creatinine 1.21 (H) 0.55 - 1.02 mg/dL    BUN/Creatinine ratio 12 12 - 20      GFR est AA 53 (L) >60 ml/min/1.73m2    GFR est non-AA 44 (L) >60 ml/min/1.73m2    Calcium 9.5 8.5 - 10.1 mg/dL    Bilirubin, total 0.3 0.2 - 1.0 mg/dL    AST (SGOT) 13 (L) 15 - 37 U/L    ALT (SGPT) 13 12 - 78 U/L    Alk.  phosphatase 150 (H) 45 - 117 U/L    Protein, total 7.7 6.4 - 8.2 g/dL    Albumin 3.7 3.5 - 5.0 g/dL    Globulin 4.0 2.0 - 4.0 g/dL    A-G Ratio 0.9 (L) 1.1 - 2.2     LIPASE    Collection Time: 08/31/21  9:15 AM   Result Value Ref Range    Lipase 135 73 - 393 U/L   TYPE & SCREEN    Collection Time: 08/31/21 10:46 AM   Result Value Ref Range    Crossmatch Expiration 09/03/2021,2359     ABO/Rh(D) AB Positive     Antibody screen Negative    COVID-19 RAPID TEST    Collection Time: 08/31/21 11:26 AM   Result Value Ref Range    Specimen source Please find results under separate order      COVID-19 rapid test Not Detected Not Detected          CT ABD PELV W CONT   Final Result   No evidence to explain rectal bleeding and no active findings             Assessment/Plan:   1. Lower GI bleed       -Hgb stable, 13.6       -CT scan - no evidence to explain rectal bleeding       -monitor hgb, transfuse as needed       -Repeat CBC in am       -rectal bleeding could be from internal hemorrhoids. We can plan for hemorrhoidal banding on Friday if still admitted, or as outpatient. 2. PEg tube for nutrition support.        -consult dietician for feeding formula and volume.               Active Problems:    Lower GI bleed (8/31/2021)         IP CONSULT TO HOSPITALIST  IP CONSULT TO GASTROENTEROLOGY    Thank you for allowing me to participate in this patients care    Ashwini GARIBAYP-C  Cc Referring Physician   Johann Quinonez MD

## 2021-08-31 NOTE — ED NOTES
Bedside shift change report given to Marquise Pro  (oncoming nurse) by Sobia Nowak RN  (offgoing nurse). Report included the following information SBAR and ED Summary.

## 2021-08-31 NOTE — ED PROVIDER NOTES
EMERGENCY DEPARTMENT HISTORY AND PHYSICAL EXAM      Date: 8/31/2021  Patient Name: Santa Mendoza    History of Presenting Illness     Chief Complaint   Patient presents with    Abdominal Pain    Other     Rectal Bleeding        History Provided By: Patient, EMS and Nursing Home/SNF/Rehab Center    HPI: Santa Mendoza, 66 y.o. female with a past medical history significant CVA, dementia, DVT, on Xarelto, PEG tube in place presents to the ED with cc of rectal bleeding onset yesterday. Actively bleeding, worsening. Associated symptoms include generalized weakness and fatigue. Patient presenting from [address] [place] [address]. Patient with recent canceled colonoscopy. Questionable history of internal hemorrhoids. Patient specifically denies chest pain, shortness of breath, fever, abdominal pain, black stool, syncope, nausea, vomiting, diarrhea. There are no other complaints, changes, or physical findings at this time. PCP: Jhnony Guerrero MD    Current Outpatient Medications   Medication Sig Dispense Refill    levETIRAcetam (Keppra) 250 mg tablet Take 1 Tablet by mouth two (2) times a day. 60 Tablet 0    levETIRAcetam 1,000 mg tablet Take 1 Tab by mouth two (2) times a day. 60 Tab 0    rivaroxaban (Xarelto) 20 mg tab tablet Take 1 Tab by mouth daily (with dinner). Indications: treatment to prevent recurrence of a clot in a deep vein 30 Tab 0    amLODIPine (NORVASC) 10 mg tablet Take 1 Tab by mouth daily. 30 Tab 0    desmopressin (DDAVP) 0.2 mg tablet Take 0.2 mg by mouth three (3) times daily.  calcium carbonate (OS-GIN) 500 mg calcium (1,250 mg) tablet Take 2 Tabs by mouth every six (6) hours as needed for Indigestion.  ferrous sulfate 300 mg (60 mg iron)/5 mL syrup Take  by mouth three (3) times daily.  lamoTRIgine (LaMICtal) 100 mg tablet Take 100 mg by mouth two (2) times a day.  Indications: convulsive seizures         Past History     Past Medical History:  Past Medical History:   Diagnosis Date    Anemia     Cerebral aneurysm     with repair    Chronic kidney disease     [facility] [address]     GI bleed     Hyperlipidemia     Hypertension     Partial diabetes insipidus ([facility] [address]     Rectal bleeding     Seizure ([facility] [address]     [facility]roke [facility])        Past Surgical History:  Past Surgical History:   Procedure Laterality Date    HX GI  07/08/2021    PEG replacement    HX OTHER SURGICAL      PEG TUBE       Family History:  No family history on file. Social History:  Social History     Tobacco Use    Smoking status: Never Smoker    Smokeless tobacco: Never Used   Substance Use Topics    Alcohol use: Not on file    Drug use: Not on file       Allergies:  No Known Allergies      Review of Systems   Review of Systems   Constitutional: Positive for fatigue. Negative for activity change, chills and fever. HENT: Negative for congestion, ear pain, rhinorrhea, sneezing and sore throat. Eyes: Negative for pain and visual disturbance. Respiratory: Negative for cough and shortness of breath. Cardiovascular: Negative for chest pain. Gastrointestinal: Positive for anal bleeding and blood in stool. Negative for abdominal pain, diarrhea, nausea and vomiting. Genitourinary: Negative for dysuria and hematuria. Musculoskeletal: Negative for gait problem. Skin: Negative for rash. Neurological: Positive for weakness. Negative for speech difficulty and headaches. Psychiatric/Behavioral: The patient is not nervous/anxious. All other systems reviewed and are negative. Physical Exam   Physical Exam  Vitals and nursing note reviewed. Exam conducted with a chaperone present (Gurwinder LINK). Constitutional:       General: She is not in acute distress. Appearance: Normal appearance. She is not ill-appearing or toxic-appearing. HENT:      Head: Normocephalic and atraumatic.       Nose: Nose normal.      Mouth/Throat:      Mouth: Mucous membranes are moist.   Eyes:      Extraocular Movements: Extraocular movements intact. Conjunctiva/sclera: Conjunctivae normal.      Pupils: Pupils are equal, round, and reactive to light. Cardiovascular:      Rate and Rhythm: Normal rate. Pulses: Normal pulses. Heart sounds: Normal heart sounds. Pulmonary:      Effort: Pulmonary effort is normal. No respiratory distress. Breath sounds: Normal breath sounds. Abdominal:      General: Bowel sounds are normal.      Palpations: Abdomen is soft. Tenderness: There is no abdominal tenderness. There is no guarding or rebound. Comments: PEG tube in place, site is clean   Genitourinary:     Rectum: Tenderness and external hemorrhoid present. Normal anal tone. Comments: Bonnee Acosta blood per rectum, mild amount of bleeding, still active, positive external hemorrhoids noted  Musculoskeletal:         General: No deformity or signs of injury. Normal range of motion. Cervical back: Normal range of motion. Skin:     General: Skin is warm and dry. Capillary Refill: Capillary refill takes less than 2 seconds. Findings: No rash. Neurological:      General: No focal deficit present. Mental Status: She is alert and oriented to person, place, and time. Cranial Nerves: No cranial nerve deficit.    Psychiatric:         Mood and Affect: Mood normal.         Diagnostic Study Results     Labs -     Recent Results (from the past 48 hour(s))   OCCULT BLOOD, STOOL    Collection Time: 08/31/21  9:00 AM   Result Value Ref Range    Occult Blood,day 1 Positive (A) Negative      Day 1 date: 9     CBC WITH AUTOMATED DIFF    Collection Time: 08/31/21  9:15 AM   Result Value Ref Range    WBC 4.6 3.6 - 11.0 K/uL    RBC 4.32 3.80 - 5.20 M/uL    HGB 13.6 11.5 - 16.0 g/dL    HCT 41.2 35.0 - 47.0 %    MCV 95.4 80.0 - 99.0 FL    MCH 31.5 26.0 - 34.0 PG    MCHC 33.0 30.0 - 36.5 g/dL    RDW 15.2 (H) 11.5 - 14.5 %    PLATELET 982 799 - 991 K/uL    MPV 9.6 8.9 - 12.9 FL    NRBC 0.0 0.0  WBC    ABSOLUTE NRBC 0.00 0.00 - 0.01 K/uL    NEUTROPHILS 40 32 - 75 %    LYMPHOCYTES 48 12 - 49 %    MONOCYTES 10 5 - 13 %    EOSINOPHILS 2 0 - 7 %    BASOPHILS 0 0 - 1 %    IMMATURE GRANULOCYTES 0 0 - 0.5 %    ABS. NEUTROPHILS 1.8 1.8 - 8.0 K/UL    ABS. LYMPHOCYTES 2.2 0.8 - 3.5 K/UL    ABS. MONOCYTES 0.4 0.0 - 1.0 K/UL    ABS. EOSINOPHILS 0.1 0.0 - 0.4 K/UL    ABS. BASOPHILS 0.0 0.0 - 0.1 K/UL    ABS. IMM. GRANS. 0.0 0.00 - 0.04 K/UL    DF AUTOMATED     METABOLIC PANEL, COMPREHENSIVE    Collection Time: 08/31/21  9:15 AM   Result Value Ref Range    Sodium 138 136 - 145 mmol/L    Potassium 4.1 3.5 - 5.1 mmol/L    Chloride 106 97 - 108 mmol/L    CO2 24 21 - 32 mmol/L    Anion gap 8 5 - 15 mmol/L    Glucose 84 65 - 100 mg/dL    BUN 15 6 - 20 mg/dL    Creatinine 1.21 (H) 0.55 - 1.02 mg/dL    BUN/Creatinine ratio 12 12 - 20      GFR est AA 53 (L) >60 ml/min/1.73m2    GFR est non-AA 44 (L) >60 ml/min/1.73m2    Calcium 9.5 8.5 - 10.1 mg/dL    Bilirubin, total 0.3 0.2 - 1.0 mg/dL    AST (SGOT) 13 (L) 15 - 37 U/L    ALT (SGPT) 13 12 - 78 U/L    Alk. phosphatase 150 (H) 45 - 117 U/L    Protein, total 7.7 6.4 - 8.2 g/dL    Albumin 3.7 3.5 - 5.0 g/dL    Globulin 4.0 2.0 - 4.0 g/dL    A-G Ratio 0.9 (L) 1.1 - 2.2     LIPASE    Collection Time: 08/31/21  9:15 AM   Result Value Ref Range    Lipase 135 73 - 393 U/L       Radiologic Studies -   XR Results (most recent):  Results from [place] encounter on 04/19/21    XR CHEST SNGL V    Narrative  Multiple seizures. Comparison chest x-ray 4/24/2020. FINDINGS: Frontal single view of the chest. Normal heart size. Calcified aorta. No vascular congestion or pulmonary edema. The lungs are similarly inflated. Left lower lung scarring. No infiltrate, effusion, pneumothorax. No free air  under diaphragm. Bony structures intact. Impression  No acute findings.      CT Results  (Last 48 hours)               08/31/21 1028  CT ABD PELV W CONT Final result    Impression:  No evidence to explain rectal bleeding and no active findings       Narrative:  CT dose reduction was achieved through use of a standardized protocol tailored   for this examination and automatic exposure control for dose modulation. Contrast study shows a linear bandlike atelectasis at the lung bases       Liver, spleen, pancreas, are normal. Tiny gallstone. Adrenals are normal. Small   cysts in each kidney       Gastrostomy tube. No small bowel abnormality, lymphadenopathy or free fluid. Advanced aortic atherosclerosis with early focal ectasia       Uterus is small. No adnexal mass or free fluid. Normal bladder. Minimal sigmoid   diverticulosis without wall thickening or fat stranding. Small and large bowel   generally collapsed. No perirectal fat edema, mass or fluid collection       Small fat-containing umbilical hernia. No significant findings in the spine                   Medical Decision Making and ED Course   I am the first provider for this patient. I reviewed the vital signs, available nursing notes, past medical history, past surgical history, family history and social history. Vital Signs-Reviewed the patient's vital signs. Patient Vitals for the past 12 hrs:   Temp Pulse Resp BP SpO2   08/31/21 0911 97.5 °F (36.4 °C) 68 18 (!) 153/83 99 %       Records Reviewed: Nursing Notes, Old Medical Records, Previous Radiology Studies and Previous Laboratory Studies    Provider Notes (Medical Decision Making):       MDM  Number of Diagnoses or Management Options  Diverticulosis  External hemorrhoid  Lower GI bleed  Weakness  Diagnosis management comments: 74-year-old female presenting from nursing facility with aguilar blood per rectum, suspect lower GI bleed secondary to internal hemorrhoids versus diverticular bleed. She is hemodynamically stable. Consulted with GI who will see the patient. She will be admitted to the medical service.        Amount and/or Complexity of Data Reviewed  Clinical lab tests: ordered and reviewed  Tests in the radiology section of CPT®: ordered and reviewed  Review and summarize past medical records: yes          ED Course:   Initial assessment performed. The patients presenting problems have been discussed, and they are in agreement with the care plan formulated and outlined with them. I have encouraged them to ask questions as they arise throughout their visit. Consult Note:  9:46 AM  Rebekah Braxton PA-C spoke with Dr. Marni Arias,  Specialty: Internal Medicine  Discussed pt's hx, disposition, and available diagnostic and imaging results. Reviewed care plans. Dr. Marni Arias agrees with admission. Consult Note:  11:13 AM  Rebekah Braxton PA-C spoke with Dr. Pippa Lemon,  Specialty: GI  Discussed pt's hx, disposition, and available diagnostic and imaging results. Reviewed care plans. will see the patient in consultation. Admit Note:  11:15 AM  Pt is being admitted by Dr. Pippa Lemon. The results of their tests and reason(s) for their admission have been discussed with pt and/or available family. They convey agreement and understanding for the need to be admitted and for admission diagnosis. Procedures       Rebekah Braxton PA-C    Procedures     Rebekah Braxton PA-C        Disposition     Disposition: Admitted to Floor Medical Floor the case was discussed with the admitting physician Dr. Marni Arias    Admitted      Diagnosis     Clinical Impression:   1. Lower GI bleed    2. Weakness    3. Diverticulosis    4. External hemorrhoid        Attestations:    Rebekah Braxton PA-C    Please note that this dictation was completed with [employer], the computer voice recognition software. Quite often unanticipated grammatical, syntax, homophones, and other interpretive errors are inadvertently transcribed by the computer software. Please disregard these errors. Please excuse any errors that have escaped final proofreading. Thank you.

## 2021-08-31 NOTE — Clinical Note
Status[de-identified] INPATIENT [101]   Type of Bed: Medical [8]   Cardiac Monitoring Required?: No   Inpatient Hospitalization Certified Necessary for the Following Reasons: 3.  Patient receiving treatment that can only be provided in an inpatient setting (further clarification in H&P documentation)   Admitting Diagnosis: Lower GI bleed [445212]   Admitting Physician: Hannah Adams [[id number]]   Attending Physician: Hannah Adams [[id number]]   Estimated Length of Stay: 2 Midnights   Discharge Plan[de-identified] Extended Care Facility (e.g. Adult Home, Nursing Home, etc.)

## 2021-08-31 NOTE — PROGRESS NOTES
History and Physical    NAME: Vida Lucas   :  1950   MRN:  [id number]     Date/Time:  2021 12:35 PM    Patient PCP: Heri Wilburn MD  ______________________________________________________________________             Subjective:     CHIEF COMPLAINT:   RECTAL BLEEDING X 1d      HISTORY OF PRESENT ILLNESS:       Patient is a 66y.o. year old female with a hx of anemia, CVA,DI  dementia, DVT on Xarelto, CKD, seizure, and PEG tube presented [facility] to the ED after 1 day of worsening rectal bleeding. She also endorsed weakness and fatigue. She states she had bright red blood per rectum. She denies constipation, diarrhea, nausea, vomiting, chest pain, shortness of breath. Patient has a history of internal hemorrhoids. She states she recently cancelled a colonoscopy, with her last one being ~20-30 years ago. ED rectal exam showed blooding and external hemorrhoid and fecal occult card positive. CXR: no acute findings  COVID test: negative  CT a/p: No evidence to explain rectal bleeding and no active findings, small fat containing umbilical hernia, minimal sigmoid diverticulosis    Hgb stable 13.6, Hct 41.2. Past Medical History:   Diagnosis Date    Anemia     Cerebral aneurysm     with repair    Chronic kidney disease     [facility] [address]     GI bleed     Hyperlipidemia     Hypertension     Partial diabetes insipidus ([facility] [address]     Rectal bleeding     Seizure ([facility] [address]     Stroke [facility])         Past Surgical History:   Procedure Laterality Date    HX GI  2021    PEG replacement    HX OTHER SURGICAL      PEG TUBE       Social History     Tobacco Use    Smoking status: Never Smoker    Smokeless tobacco: Never Used   Substance Use Topics    Alcohol use: Not on file        No family history on file. No Known Allergies     Prior to Admission medications    Medication Sig Start Date End Date Taking?  Authorizing Provider   levETIRAcetam (Keppra) 250 mg tablet Take 1 Tablet by mouth two (2) times a day. 8/9/21   Calvin Dailey MD   levETIRAcetam 1,000 mg tablet Take 1 Tab by mouth two (2) times a day. 4/22/21   Calvin Dailey MD   rivaroxaban (Xarelto) 20 mg tab tablet Take 1 Tab by mouth daily (with dinner). Indications: treatment to prevent recurrence of a clot in a deep vein 4/22/21   Calvin Dailey MD   amLODIPine (NORVASC) 10 mg tablet Take 1 Tab by mouth daily. 4/23/21   Calvin Dailey MD   desmopressin (DDAVP) 0.2 mg tablet Take 0.2 mg by mouth three (3) times daily. Provider, Historical   calcium carbonate (OS-GIN) 500 mg calcium (1,250 mg) tablet Take 2 Tabs by mouth every six (6) hours as needed for Indigestion. Provider, Historical   ferrous sulfate 300 mg (60 mg iron)/5 mL syrup Take  by mouth three (3) times daily. Provider, Historical   lamoTRIgine (LaMICtal) 100 mg tablet Take 100 mg by mouth two (2) times a day. Indications: convulsive seizures    Provider, Historical       No current facility-administered medications for this encounter. Current Outpatient Medications:     levETIRAcetam (Keppra) 250 mg tablet, Take 1 Tablet by mouth two (2) times a day., Disp: 60 Tablet, Rfl: 0    levETIRAcetam 1,000 mg tablet, Take 1 Tab by mouth two (2) times a day., Disp: 60 Tab, Rfl: 0    rivaroxaban (Xarelto) 20 mg tab tablet, Take 1 Tab by mouth daily (with dinner). Indications: treatment to prevent recurrence of a clot in a deep vein, Disp: 30 Tab, Rfl: 0    amLODIPine (NORVASC) 10 mg tablet, Take 1 Tab by mouth daily. , Disp: 30 Tab, Rfl: 0    desmopressin (DDAVP) 0.2 mg tablet, Take 0.2 mg by mouth three (3) times daily. , Disp: , Rfl:     calcium carbonate (OS-GIN) 500 mg calcium (1,250 mg) tablet, Take 2 Tabs by mouth every six (6) hours as needed for Indigestion. , Disp: , Rfl:     ferrous sulfate 300 mg (60 mg iron)/5 mL syrup, Take  by mouth three (3) times daily. , Disp: , Rfl:     lamoTRIgine (LaMICtal) 100 mg tablet, Take 100 mg by mouth two (2) times a day. Indications: convulsive seizures, Disp: , Rfl:     LAB DATA REVIEWED:    Recent Results (from the past 24 hour(s))   OCCULT BLOOD, STOOL    Collection Time: 08/31/21  9:00 AM   Result Value Ref Range    Occult Blood,day 1 Positive (A) Negative      Day 1 date: 9     CBC WITH AUTOMATED DIFF    Collection Time: 08/31/21  9:15 AM   Result Value Ref Range    WBC 4.6 3.6 - 11.0 K/uL    RBC 4.32 3.80 - 5.20 M/uL    HGB 13.6 11.5 - 16.0 g/dL    HCT 41.2 35.0 - 47.0 %    MCV 95.4 80.0 - 99.0 FL    MCH 31.5 26.0 - 34.0 PG    MCHC 33.0 30.0 - 36.5 g/dL    RDW 15.2 (H) 11.5 - 14.5 %    PLATELET 927 577 - 688 K/uL    MPV 9.6 8.9 - 12.9 FL    NRBC 0.0 0.0  WBC    ABSOLUTE NRBC 0.00 0.00 - 0.01 K/uL    NEUTROPHILS 40 32 - 75 %    LYMPHOCYTES 48 12 - 49 %    MONOCYTES 10 5 - 13 %    EOSINOPHILS 2 0 - 7 %    BASOPHILS 0 0 - 1 %    IMMATURE GRANULOCYTES 0 0 - 0.5 %    ABS. NEUTROPHILS 1.8 1.8 - 8.0 K/UL    ABS. LYMPHOCYTES 2.2 0.8 - 3.5 K/UL    ABS. MONOCYTES 0.4 0.0 - 1.0 K/UL    ABS. EOSINOPHILS 0.1 0.0 - 0.4 K/UL    ABS. BASOPHILS 0.0 0.0 - 0.1 K/UL    ABS. IMM. GRANS. 0.0 0.00 - 0.04 K/UL    DF AUTOMATED     METABOLIC PANEL, COMPREHENSIVE    Collection Time: 08/31/21  9:15 AM   Result Value Ref Range    Sodium 138 136 - 145 mmol/L    Potassium 4.1 3.5 - 5.1 mmol/L    Chloride 106 97 - 108 mmol/L    CO2 24 21 - 32 mmol/L    Anion gap 8 5 - 15 mmol/L    Glucose 84 65 - 100 mg/dL    BUN 15 6 - 20 mg/dL    Creatinine 1.21 (H) 0.55 - 1.02 mg/dL    BUN/Creatinine ratio 12 12 - 20      GFR est AA 53 (L) >60 ml/min/1.73m2    GFR est non-AA 44 (L) >60 ml/min/1.73m2    Calcium 9.5 8.5 - 10.1 mg/dL    Bilirubin, total 0.3 0.2 - 1.0 mg/dL    AST (SGOT) 13 (L) 15 - 37 U/L    ALT (SGPT) 13 12 - 78 U/L    Alk.  phosphatase 150 (H) 45 - 117 U/L    Protein, total 7.7 6.4 - 8.2 g/dL    Albumin 3.7 3.5 - 5.0 g/dL    Globulin 4.0 2.0 - 4.0 g/dL    A-G Ratio 0.9 (L) 1.1 - 2.2     LIPASE    Collection Time: 08/31/21  9:15 AM   Result Value Ref Range    Lipase 135 73 - 393 U/L   TYPE & SCREEN    Collection Time: 08/31/21 10:46 AM   Result Value Ref Range    Crossmatch Expiration 09/03/2021,2359     ABO/Rh(D) AB Positive     Antibody screen Negative    COVID-19 RAPID TEST    Collection Time: 08/31/21 11:26 AM   Result Value Ref Range    Specimen source Please find results under separate order      COVID-19 rapid test Not Detected Not Detected         XR Results (most recent):  Results from Hospital Encounter encounter on 04/19/21    XR CHEST SNGL V    Narrative  Multiple seizures. Comparison chest x-ray 4/24/2020. FINDINGS: Frontal single view of the chest. Normal heart size. Calcified aorta. No vascular congestion or pulmonary edema. The lungs are similarly inflated. Left lower lung scarring. No infiltrate, effusion, pneumothorax. No free air  under diaphragm. Bony structures intact. Impression  No acute findings. CT ABD PELV W CONT   Final Result   No evidence to explain rectal bleeding and no active findings           Review of Systems:  Constitutional: Negative for chills and fever. HENT: Negative. Eyes: Negative. Respiratory: Negative. Cardiovascular: Negative. Gastrointestinal: No abdominal pain. Bloody stools. Skin: Negative. Neurological: Negative. Objective:   VITALS:    Visit Vitals  BP (!) 153/83   Pulse 68   Temp 97.5 °F (36.4 °C)   Resp 18   SpO2 99%       Physical Exam:   Constitutional: pt is oriented to person, place, and time. Head: Normocephalic and atraumatic. Eyes: Pupils are equal, round, and reactive to light. EOM are normal.   Cardiovascular: Normal rate, regular rhythm and normal heart sounds. Pulmonary/Chest: Breath sounds normal. No wheezes. No rales. Exhibits no tenderness. Abdominal: Soft. Bowel sounds are normal. There is mild abdominal tenderness diffusely across lower abdomen. There is no rebound and no guarding.  PEG tube in place, clean, dry, mild amount of aguilar bleeding per rectum, external hemorrhoids noted  Genitourinary: + hutchins  Musculoskeletal: Normal range of motion. Neurological: pt is alert and oriented to person, place, and time. Alert.  No focal neurologic deficit      ASSESSMENT :    Lower GI bleed  CT a/p no acute findings   Fecal occult test positive  Consult GI  Monitor H&H    External Hemorrhoid            Diverticulosis          CT a/p showed mild sigmoid diverticulosis without wall thickening or fat stranding    Weakness    Hx HTN    Hx Seizure disorder  CXR negative    Hx PEG tube    Hx dementia    Hx CVA     Hx DVT on eliquis             Hx CKD and anemia of chronic disease 2/2 CKD           Cr           BUN           H&H 13.6/41.2       PLAN:  Start NS IVF  Continue Keppra 1,000 mg PO BID  Hold  Xarelto 20 mg PO every day  Continue Amlodipine, 10 mg, PO every day  Continue DDAVP, 0.2 mg PO TID  Continue Ca carbonate, 2 tabs, PO Q6H prn indigestion  Continue ferrous sulfate 300 mg/5 mL syrup PO TID  Continue lamotrigine, 100 mg, PO BID  Start protonix 40 mg PO QD  NPO    Consult GI  Monitor H&H  ________________________________________________________________________    Signed: Fercho Briceno MD

## 2021-09-01 LAB
ALBUMIN SERPL-MCNC: 3.1 G/DL (ref 3.5–5)
ALBUMIN/GLOB SERPL: 0.9 {RATIO} (ref 1.1–2.2)
ALP SERPL-CCNC: 128 U/L (ref 45–117)
ALT SERPL-CCNC: 12 U/L (ref 12–78)
ANION GAP SERPL CALC-SCNC: 6 MMOL/L (ref 5–15)
AST SERPL W P-5'-P-CCNC: 8 U/L (ref 15–37)
BASOPHILS # BLD: 0 K/UL (ref 0–0.1)
BASOPHILS NFR BLD: 1 % (ref 0–1)
BILIRUB SERPL-MCNC: 0.4 MG/DL (ref 0.2–1)
BUN SERPL-MCNC: 10 MG/DL (ref 6–20)
BUN/CREAT SERPL: 10 (ref 12–20)
CA-I BLD-MCNC: 8.8 MG/DL (ref 8.5–10.1)
CHLORIDE SERPL-SCNC: 110 MMOL/L (ref 97–108)
CO2 SERPL-SCNC: 27 MMOL/L (ref 21–32)
CREAT SERPL-MCNC: 1.02 MG/DL (ref 0.55–1.02)
DIFFERENTIAL METHOD BLD: ABNORMAL
EOSINOPHIL # BLD: 0.1 K/UL (ref 0–0.4)
EOSINOPHIL NFR BLD: 2 % (ref 0–7)
ERYTHROCYTE [DISTWIDTH] IN BLOOD BY AUTOMATED COUNT: 15.4 % (ref 11.5–14.5)
EST. AVERAGE GLUCOSE BLD GHB EST-MCNC: 114 MG/DL
GLOBULIN SER CALC-MCNC: 3.6 G/DL (ref 2–4)
GLUCOSE BLD STRIP.AUTO-MCNC: 83 MG/DL (ref 65–117)
GLUCOSE BLD STRIP.AUTO-MCNC: 88 MG/DL (ref 65–117)
GLUCOSE BLD STRIP.AUTO-MCNC: 88 MG/DL (ref 65–117)
GLUCOSE BLD STRIP.AUTO-MCNC: 92 MG/DL (ref 65–117)
GLUCOSE BLD STRIP.AUTO-MCNC: 93 MG/DL (ref 65–117)
GLUCOSE SERPL-MCNC: 89 MG/DL (ref 65–100)
HBA1C MFR BLD: 5.6 % (ref 4–5.6)
HCT VFR BLD AUTO: 39.5 % (ref 35–47)
HGB BLD-MCNC: 12.7 G/DL (ref 11.5–16)
IMM GRANULOCYTES # BLD AUTO: 0 K/UL (ref 0–0.04)
IMM GRANULOCYTES NFR BLD AUTO: 0 % (ref 0–0.5)
LYMPHOCYTES # BLD: 1.8 K/UL (ref 0.8–3.5)
LYMPHOCYTES NFR BLD: 43 % (ref 12–49)
MCH RBC QN AUTO: 31.1 PG (ref 26–34)
MCHC RBC AUTO-ENTMCNC: 32.2 G/DL (ref 30–36.5)
MCV RBC AUTO: 96.8 FL (ref 80–99)
MONOCYTES # BLD: 0.5 K/UL (ref 0–1)
MONOCYTES NFR BLD: 12 % (ref 5–13)
NEUTS SEG # BLD: 1.7 K/UL (ref 1.8–8)
NEUTS SEG NFR BLD: 42 % (ref 32–75)
NRBC # BLD: 0 K/UL (ref 0–0.01)
NRBC BLD-RTO: 0 PER 100 WBC
PERFORMED BY, TECHID: NORMAL
PLATELET # BLD AUTO: 209 K/UL (ref 150–400)
PMV BLD AUTO: 9.8 FL (ref 8.9–12.9)
POTASSIUM SERPL-SCNC: 3.2 MMOL/L (ref 3.5–5.1)
PROT SERPL-MCNC: 6.7 G/DL (ref 6.4–8.2)
RBC # BLD AUTO: 4.08 M/UL (ref 3.8–5.2)
SODIUM SERPL-SCNC: 143 MMOL/L (ref 136–145)
WBC # BLD AUTO: 4.1 K/UL (ref 3.6–11)

## 2021-09-01 PROCEDURE — 82962 GLUCOSE BLOOD TEST: CPT

## 2021-09-01 PROCEDURE — 36573 INSJ PICC RS&I 5 YR+: CPT | Performed by: FAMILY MEDICINE

## 2021-09-01 PROCEDURE — 65270000029 HC RM PRIVATE

## 2021-09-01 PROCEDURE — 74011250637 HC RX REV CODE- 250/637: Performed by: FAMILY MEDICINE

## 2021-09-01 PROCEDURE — 02HV33Z INSERTION OF INFUSION DEVICE INTO SUPERIOR VENA CAVA, PERCUTANEOUS APPROACH: ICD-10-PCS | Performed by: FAMILY MEDICINE

## 2021-09-01 PROCEDURE — 36415 COLL VENOUS BLD VENIPUNCTURE: CPT

## 2021-09-01 PROCEDURE — 80053 COMPREHEN METABOLIC PANEL: CPT

## 2021-09-01 PROCEDURE — 85025 COMPLETE CBC W/AUTO DIFF WBC: CPT

## 2021-09-01 RX ADMIN — LEVETIRACETAM 250 MG: 250 TABLET, FILM COATED ORAL at 09:54

## 2021-09-01 RX ADMIN — DESMOPRESSIN ACETATE 200 MCG: 0.2 TABLET ORAL at 22:42

## 2021-09-01 RX ADMIN — MINERAL SUPPLEMENT IRON 300 MG / 5 ML STRENGTH LIQUID 100 PER BOX UNFLAVORED 300 MG: at 22:42

## 2021-09-01 RX ADMIN — DESMOPRESSIN ACETATE 200 MCG: 0.2 TABLET ORAL at 09:54

## 2021-09-01 RX ADMIN — AMLODIPINE BESYLATE 10 MG: 5 TABLET ORAL at 09:54

## 2021-09-01 RX ADMIN — MINERAL SUPPLEMENT IRON 300 MG / 5 ML STRENGTH LIQUID 100 PER BOX UNFLAVORED 300 MG: at 18:44

## 2021-09-01 RX ADMIN — LAMOTRIGINE 100 MG: 100 TABLET ORAL at 09:54

## 2021-09-01 RX ADMIN — MINERAL SUPPLEMENT IRON 300 MG / 5 ML STRENGTH LIQUID 100 PER BOX UNFLAVORED 300 MG: at 09:52

## 2021-09-01 RX ADMIN — LAMOTRIGINE 100 MG: 100 TABLET ORAL at 22:42

## 2021-09-01 RX ADMIN — LEVETIRACETAM 250 MG: 250 TABLET, FILM COATED ORAL at 22:42

## 2021-09-01 RX ADMIN — LEVETIRACETAM 1000 MG: 500 TABLET ORAL at 22:42

## 2021-09-01 RX ADMIN — DESMOPRESSIN ACETATE 200 MCG: 0.2 TABLET ORAL at 18:44

## 2021-09-01 RX ADMIN — LEVETIRACETAM 1000 MG: 500 TABLET ORAL at 09:53

## 2021-09-01 NOTE — PROGRESS NOTES
General Daily Progress Note          Patient Name:   Abigail Gerard       YOB: 1950       Age:  66 y.o. Admit Date: 8/31/2021      Subjective:       Patient alert awake not in distress has some bloody stool           Objective:     Visit Vitals  /65 (BP 1 Location: Right upper arm, BP Patient Position: At rest)   Pulse 75   Temp 97.8 °F (36.6 °C)   Resp 18   SpO2 94%        Recent Results (from the past 24 hour(s))   TYPE & SCREEN    Collection Time: 08/31/21 10:46 AM   Result Value Ref Range    Crossmatch Expiration 09/03/2021,2359     ABO/Rh(D) AB Positive     Antibody screen Negative    COVID-19 RAPID TEST    Collection Time: 08/31/21 11:26 AM   Result Value Ref Range    Specimen source Please find results under separate order      COVID-19 rapid test Not Detected Not Detected     GLUCOSE, POC    Collection Time: 09/01/21 12:05 AM   Result Value Ref Range    Glucose (POC) 92 65 - 117 mg/dL    Performed by Jessica Nicole      [unfilled]      Review of Systems    Constitutional: Negative for chills and fever. HENT: Negative. Eyes: Negative. Respiratory: Negative. Cardiovascular: Negative. Gastrointestinal: Negative for abdominal pain and nausea. Skin: Negative. Neurological: Negative. Physical Exam:      Constitutional: pt is oriented to person, place, and time. HENT:   Head: Normocephalic and atraumatic. Eyes: Pupils are equal, round, and reactive to light. EOM are normal.   Cardiovascular: Normal rate, regular rhythm and normal heart sounds. Pulmonary/Chest: Breath sounds normal. No wheezes. No rales. Exhibits no tenderness. Abdominal: Soft. Bowel sounds are normal. There is no abdominal tenderness. There is no rebound and no guarding. Musculoskeletal: Normal range of motion. Neurological: pt is alert and oriented to person, place, and time.      CT ABD PELV W CONT   Final Result   No evidence to explain rectal bleeding and no active findings           Recent Results (from the past 24 hour(s))   TYPE & SCREEN    Collection Time: 08/31/21 10:46 AM   Result Value Ref Range    Crossmatch Expiration 09/03/2021,2359     ABO/Rh(D) AB Positive     Antibody screen Negative    COVID-19 RAPID TEST    Collection Time: 08/31/21 11:26 AM   Result Value Ref Range    Specimen source Please find results under separate order      COVID-19 rapid test Not Detected Not Detected     GLUCOSE, POC    Collection Time: 09/01/21 12:05 AM   Result Value Ref Range    Glucose (POC) 92 65 - 117 mg/dL    Performed by Red Lundberg        Results     Procedure Component Value Units Date/Time    COVID-19 RAPID TEST [[id number]] Collected: 08/31/21 1126    Order Status: Completed Specimen: Nasopharyngeal Updated: 08/31/21 1305     Specimen source       Please find results under separate order           COVID-19 rapid test Not Detected        Comment: Rapid Abbott ID Now   Rapid NAAT:  The specimen is NEGATIVE for SARS-CoV-2, the novel coronavirus associated with COVID-19. Negative results should be treated as presumptive and, if inconsistent with clinical signs and symptoms or necessary for patient management, should be tested with an alternative molecular assay. Negative results do not preclude SARS-CoV-2 infection and should not be used as the sole basis for patient management decisions. This test has been authorized by the FDA under   an Emergency Use Authorization (EUA) for use by authorized laboratories.  Fact sheet for Healthcare Providers: ConventionUpdate.co.nz Fact sheet for Patients: ConventionUpdate.co.nz   Methodology: Isothermal Nucleic Acid Amplification                Labs:     Recent Labs     08/31/21  0915   WBC 4.6   HGB 13.6   HCT 41.2        Recent Labs     08/31/21  0915      K 4.1      CO2 24   BUN 15   CREA 1.21*   GLU 84   CA 9.5     Recent Labs     08/31/21  0915   ALT 13   *   TBILI 0.3   TP 7.7   ALB 3.7   GLOB 4.0   LPSE 135     No results for input(s): INR, PTP, APTT, INREXT in the last 72 hours. No results for input(s): FE, TIBC, PSAT, FERR in the last 72 hours. No results found for: FOL, RBCF   No results for input(s): PH, PCO2, PO2 in the last 72 hours. No results for input(s): CPK, CKNDX, TROIQ in the last 72 hours.     No lab exists for component: CPKMB  No results found for: CHOL, CHOLX, CHLST, CHOLV, HDL, HDLP, LDL, LDLC, DLDLP, TGLX, TRIGL, TRIGP, CHHD, CHHDX  Lab Results   Component Value Date/Time    Glucose (POC) 92 09/01/2021 12:05 AM    Glucose (POC) 85 08/08/2021 07:20 PM    Glucose (POC) 104 08/08/2021 01:57 PM    Glucose (POC) 147 (H) 04/20/2021 08:23 PM    Glucose (POC) 102 (H) 04/20/2021 03:46 PM     Lab Results   Component Value Date/Time    Color Yellow/Straw 08/05/2021 05:20 AM    Appearance Clear 08/05/2021 05:20 AM    Specific gravity 1.008 08/05/2021 05:20 AM    Specific gravity 1.012 11/30/2013 04:50 AM    pH (UA) 7.0 08/05/2021 05:20 AM    Protein Negative 08/05/2021 05:20 AM    Glucose Negative 08/05/2021 05:20 AM    Ketone Negative 08/05/2021 05:20 AM    Bilirubin Negative 08/05/2021 05:20 AM    Urobilinogen 0.1 08/05/2021 05:20 AM    Nitrites Negative 08/05/2021 05:20 AM    Leukocyte Esterase Negative 08/05/2021 05:20 AM    Epithelial cells FEW 11/30/2013 04:50 AM    Bacteria Negative 08/05/2021 05:20 AM    WBC 0-4 08/05/2021 05:20 AM    RBC 0-5 08/05/2021 05:20 AM         Assessment:     Lower GI bleed     External Hemorrhoid             Diverticulosis     Generalized weakness     Hx HTN     Hx Seizure disorder  CXR negative     Hx PEG tube     Hx dementia     Hx CVA      Hx DVT on eliquis                       Hx CKD and anemia of chronic disease 2/2 CKD           Plan:     Continue current medications  Today's labs are pending  If hemoglobin stable and cleared by GI patient can be discharged home        Current Facility-Administered Medications:    amLODIPine (NORVASC) tablet 10 mg, 10 mg, Oral, DAILY, Mohiuddin, Philomena Paget, MD    levETIRAcetam (KEPPRA) tablet 250 mg, 250 mg, Oral, BID, Ehsan Dailey MD, 250 mg at 08/31/21 2355    levETIRAcetam (KEPPRA) tablet 1,000 mg, 1,000 mg, Oral, BID, Mohiuddin, Philomena Paget, MD, 1,000 mg at 08/31/21 2356    desmopressin (DDAVP) tablet 200 mcg, 0.2 mg, Oral, TID, Mohiuddin, Philomena Paget, MD, 200 mcg at 08/31/21 2356    ferrous sulfate 300 mg (60 mg iron)/5 mL oral syrup 300 mg, 300 mg, Oral, TID, Mohiuddin, Philomena Paget, MD, 300 mg at 08/31/21 2356    lamoTRIgine (LaMICtal) tablet 100 mg, 100 mg, Oral, BID, Ehsan Dailey MD, 100 mg at 08/31/21 2356    insulin lispro (HUMALOG) injection, , SubCUTAneous, AC&HS, Mohiuddin, Philomena Paget, MD    glucose chewable tablet 16 g, 4 Tablet, Oral, PRN, Mohiuddin, Philomena Paget, MD    dextrose (D50W) injection syrg 12.5-25 g, 25-50 mL, IntraVENous, PRN, Mohiuddin, Philomena Paget, MD    glucagon (GLUCAGEN) injection 1 mg, 1 mg, IntraMUSCular, PRN, Helena Danielle MD    0.9% sodium chloride infusion, 75 mL/hr, IntraVENous, CONTINUOUS, Ehsan Dailey MD, Last Rate: 75 mL/hr at 08/31/21 2358, 75 mL/hr at 08/31/21 2358    acetaminophen (TYLENOL) tablet 650 mg, 650 mg, Oral, Q6H PRN **OR** acetaminophen (TYLENOL) suppository 650 mg, 650 mg, Rectal, Q6H PRN, Ehsan Dailey MD    polyethylene glycol (MIRALAX) packet 17 g, 17 g, Oral, DAILY PRN, Ehsan Dailey MD    ondansetron (ZOFRAN ODT) tablet 4 mg, 4 mg, Oral, Q8H PRN **OR** ondansetron (ZOFRAN) injection 4 mg, 4 mg, IntraVENous, Q6H PRN, Helena Danielle MD

## 2021-09-01 NOTE — ROUTINE PROCESS
Patient skin assessment done with Kali Whelan Rn and Rod Osman Rn, patient does not have a pressure sore. Redness-pink with yeasty tan drainage under right breast moisture related, zguard applied. Dry flaky bilateral feet no open areas.

## 2021-09-01 NOTE — PROGRESS NOTES
Nutrition Note    RD consulted for TF recommendations. Noted admit 1x month ago, pt with PEG in situ for meds and flushes, however pt able to tolerate reg texture and consumes PO. Pt ordered water flushes per Nephrology via PEG at this time. Rec'd initiate Soft/ Chopped diet. If concern for asp, rec'd SLP eval    If pt with limited fluid intakes, rec'd 100mL q4hr flush or per Nephrology. RD to f/u 9/2 and will complete full assessment as indicated.       Electronically signed by Tiana Bauer on 9/1/2021 at 3:38 PM    Contact:

## 2021-09-01 NOTE — PROGRESS NOTES
Progress Note    Patient: Jovi Delacruz MRN: [id number]  SSN: [id number]    YOB: 1950  Age: 66 y.o. Sex: female      Admit Date: 8/31/2021    LOS: 1 day     Subjective:   GI is following in consultation for rectal bleeding     Patient is awake and alert not in acute distress. Denies abdominal pain, nausea, nor vomiting. Denies further rectal bleeding today. Bilateral lower extremities with 2+ pitting edema.  -Hgb today is 12.7, creatinine normal 1.02.    8/31 GI consult note:   Patient was admitted to the hospital from [address] with the c/o rectal bleeding and abdominal pain. She has a past medical history of CVA, dementia, DVT, on Xarelto, PEG tube placement. Staff noticed rectal bleeding that started yesterday. Accompanying symptoms are generalized weakness and fatigue. Recent cancelled colonoscopy for unknown reason. Fecal occult positive. In the ER, hgb appears stable 13.6, creatinine elevated at 1.21.     CT scan of abdomen No evidence to explain rectal bleeding and no active findings. Objective:     Vitals:    08/31/21 0911 08/31/21 1948 09/01/21 0329 09/01/21 1100   BP: (!) 153/83 (!) 143/66 114/65 (!) 141/81   Pulse: 68 74 75 81   Resp: 18 18 18 18   Temp: 97.5 °F (36.4 °C) 97.7 °F (36.5 °C) 97.8 °F (36.6 °C) 97.6 °F (36.4 °C)   SpO2: 99% 96% 94% 95%        Intake and Output:  Current Shift: No intake/output data recorded. Last three shifts: 08/30 1901 - 09/01 0700  In: 1000 [I.V.:1000]  Out: -     Physical Exam:   Skin:  Extremities and face reveal no rashes. No ogden erythema. HEENT: Sclerae anicteric. Extra-occular muscles are intact. No abnormal pigmentation of the lips. The neck is supple. Cardiovascular: Regular rate and rhythm. Respiratory:  Comfortable breathing with no accessory muscle use. GI:  Abdomen nondistended, soft, and nontender. No enlargement of the liver or spleen. No masses palpable.   Rectal: Deferred  Musculoskeletal:Pitting edema lower extremities2+,  Extremities have good range of motion. Neurological: History of dementia. Patient is alert and oriented. Psychiatric:  Mood appears appropriate with judgement intact. Lymphatic:  No visible adenopathy      Lab/Data Review:  Recent Results (from the past 24 hour(s))   GLUCOSE, POC    Collection Time: 09/01/21 12:05 AM   Result Value Ref Range    Glucose (POC) 92 65 - 117 mg/dL    Performed by Mansoor, POC    Collection Time: 09/01/21  9:29 AM   Result Value Ref Range    Glucose (POC) 83 65 - 117 mg/dL    Performed by Mana Philip    CBC WITH AUTOMATED DIFF    Collection Time: 09/01/21 10:30 AM   Result Value Ref Range    WBC 4.1 3.6 - 11.0 K/uL    RBC 4.08 3.80 - 5.20 M/uL    HGB 12.7 11.5 - 16.0 g/dL    HCT 39.5 35.0 - 47.0 %    MCV 96.8 80.0 - 99.0 FL    MCH 31.1 26.0 - 34.0 PG    MCHC 32.2 30.0 - 36.5 g/dL    RDW 15.4 (H) 11.5 - 14.5 %    PLATELET 796 991 - 909 K/uL    MPV 9.8 8.9 - 12.9 FL    NRBC 0.0 0.0  WBC    ABSOLUTE NRBC 0.00 0.00 - 0.01 K/uL    NEUTROPHILS 42 32 - 75 %    LYMPHOCYTES 43 12 - 49 %    MONOCYTES 12 5 - 13 %    EOSINOPHILS 2 0 - 7 %    BASOPHILS 1 0 - 1 %    IMMATURE GRANULOCYTES 0 0 - 0.5 %    ABS. NEUTROPHILS 1.7 (L) 1.8 - 8.0 K/UL    ABS. LYMPHOCYTES 1.8 0.8 - 3.5 K/UL    ABS. MONOCYTES 0.5 0.0 - 1.0 K/UL    ABS. EOSINOPHILS 0.1 0.0 - 0.4 K/UL    ABS. BASOPHILS 0.0 0.0 - 0.1 K/UL    ABS. IMM.  GRANS. 0.0 0.00 - 0.04 K/UL    DF AUTOMATED     METABOLIC PANEL, COMPREHENSIVE    Collection Time: 09/01/21 10:30 AM   Result Value Ref Range    Sodium 143 136 - 145 mmol/L    Potassium 3.2 (L) 3.5 - 5.1 mmol/L    Chloride 110 (H) 97 - 108 mmol/L    CO2 27 21 - 32 mmol/L    Anion gap 6 5 - 15 mmol/L    Glucose 89 65 - 100 mg/dL    BUN 10 6 - 20 mg/dL    Creatinine 1.02 0.55 - 1.02 mg/dL    BUN/Creatinine ratio 10 (L) 12 - 20      GFR est AA >60 >60 ml/min/1.73m2    GFR est non-AA 53 (L) >60 ml/min/1.73m2 Calcium 8.8 8.5 - 10.1 mg/dL    Bilirubin, total 0.4 0.2 - 1.0 mg/dL    AST (SGOT) 8 (L) 15 - 37 U/L    ALT (SGPT) 12 12 - 78 U/L    Alk. phosphatase 128 (H) 45 - 117 U/L    Protein, total 6.7 6.4 - 8.2 g/dL    Albumin 3.1 (L) 3.5 - 5.0 g/dL    Globulin 3.6 2.0 - 4.0 g/dL    A-G Ratio 0.9 (L) 1.1 - 2.2     GLUCOSE, POC    Collection Time: 09/01/21 11:09 AM   Result Value Ref Range    Glucose (POC) 93 65 - 117 mg/dL    Performed by Danika SEXTON ABD PELV W CONT   Final Result   No evidence to explain rectal bleeding and no active findings          Assessment:     Active Problems:    Lower GI bleed (8/31/2021)      GI bleed (8/31/2021)        Plan:   1. Lower GI bleed       -Hgb stable, 12.7       -CT scan - no evidence to explain rectal bleeding       -monitor hgb, transfuse as needed       -Repeat CBC in am       -rectal bleeding could be from internal hemorrhoids. We can plan for hemorrhoidal banding on Friday if still admitted, or as outpatient. 2. PEg tube for nutrition support.        -consult dietician for feeding formula and volume. Patient discussed with Dr Lizzie Coker and agrees to above impression and plan. Thank you for allowing me to participate in this patients care    Signed By: Bennett Rolle.  ANTHONY Andujar     September 1, 2021

## 2021-09-01 NOTE — H&P
History and Physical    NAME: Vitor Lazo   :  1950   MRN:  [id number]     Date/Time:  2021 12:35 PM    Patient PCP: Vilma Melara MD  ______________________________________________________________________             Subjective:     CHIEF COMPLAINT:   RECTAL BLEEDING X 1d      HISTORY OF PRESENT ILLNESS:       Patient is a 66y.o. year old female with a hx of anemia, CVA,DI  dementia, DVT on Xarelto, CKD, seizure, and PEG tube presented from SNF to the ED after 1 day of worsening rectal bleeding. She also endorsed weakness and fatigue. She states she had bright red blood per rectum. She denies constipation, diarrhea, nausea, vomiting, chest pain, shortness of breath. Patient has a history of internal hemorrhoids. She states she recently cancelled a colonoscopy, with her last one being ~20-30 years ago. ED rectal exam showed blooding and external hemorrhoid and fecal occult card positive. CXR: no acute findings  COVID test: negative  CT a/p: No evidence to explain rectal bleeding and no active findings, small fat containing umbilical hernia, minimal sigmoid diverticulosis    Hgb stable 13.6, Hct 41.2. Past Medical History:   Diagnosis Date    Anemia     Cerebral aneurysm     with repair    Chronic kidney disease     [facility] [address]     GI bleed     Hyperlipidemia     Hypertension     Partial diabetes insipidus ([facility] [address]     Rectal bleeding     Seizure ([facility] [address]     Stroke [facility])         Past Surgical History:   Procedure Laterality Date    HX GI  2021    PEG replacement    HX OTHER SURGICAL      PEG TUBE       Social History     Tobacco Use    Smoking status: Never Smoker    Smokeless tobacco: Never Used   Substance Use Topics    Alcohol use: Not on file        No family history on file. No Known Allergies     Prior to Admission medications    Medication Sig Start Date End Date Taking?  Authorizing Provider   Vitamin D2 1,250 mcg (50,000 unit) capsule Take 1 Capsule by mouth every seven (7) days. Fridays 8/13/21  Yes Provider, Historical   nystatin (MYCOSTATIN) topical cream Apply  to affected area two (2) times a day. Apply twice daily under breasts 8/25/21  Yes Provider, Historical   levETIRAcetam (KEPPRA) 100 mg/mL solution Take 12.5 mL by mouth two (2) times a day. 8/9/21  Yes Provider, Historical   docusate sodium (COLACE) 100 mg capsule Take 100 mg by mouth daily. Yes Provider, Historical   rivaroxaban (Xarelto) 20 mg tab tablet Take 1 Tab by mouth daily (with dinner). Indications: treatment to prevent recurrence of a clot in a deep vein 4/22/21  Yes Calvin Dailey MD   amLODIPine (NORVASC) 10 mg tablet Take 1 Tab by mouth daily. 4/23/21  Yes Jhonny Guerrero MD   desmopressin (DDAVP) 0.2 mg tablet Take 0.2 mg by mouth three (3) times daily. Yes Provider, Historical   calcium carbonate (OS-GIN) 500 mg calcium (1,250 mg) tablet Take 2 Tabs by mouth every six (6) hours as needed for Indigestion. Yes Provider, Historical   ferrous sulfate 300 mg (60 mg iron)/5 mL syrup Take 1 tsp by mouth three (3) times daily. Yes Provider, Historical   lamoTRIgine (LaMICtal) 100 mg tablet Take 100 mg by mouth two (2) times a day.  Indications: convulsive seizures   Yes Provider, Historical         Current Facility-Administered Medications:     amLODIPine (NORVASC) tablet 10 mg, 10 mg, Oral, DAILY, Ehsan Dailey MD    levETIRAcetam (KEPPRA) tablet 250 mg, 250 mg, Oral, BID, Ehsan Dailey MD, 250 mg at 08/31/21 2355    levETIRAcetam (KEPPRA) tablet 1,000 mg, 1,000 mg, Oral, BID, Ehsan Dailey MD, 1,000 mg at 08/31/21 2356    desmopressin (DDAVP) tablet 200 mcg, 0.2 mg, Oral, TID, Ehsan Dailey MD, 200 mcg at 08/31/21 2356    ferrous sulfate 300 mg (60 mg iron)/5 mL oral syrup 300 mg, 300 mg, Oral, TID, Ehsan Dailey MD, 300 mg at 08/31/21 1761    lamoTRIgine (LaMICtal) tablet 100 mg, 100 mg, Oral, BID, Ehsan Dailey MD, 100 mg at 08/31/21 2313   insulin lispro (HUMALOG) injection, , SubCUTAneous, AC&HS, Arvind Dailey MD    glucose chewable tablet 16 g, 4 Tablet, Oral, PRN, Arvind Dailey MD    dextrose (D50W) injection syrg 12.5-25 g, 25-50 mL, IntraVENous, PRN, Arvind Dailey MD    glucagon [facility]) injection 1 mg, 1 mg, IntraMUSCular, PRN, Arvind Dailey MD    0.9% sodium chloride infusion, 75 mL/hr, IntraVENous, CONTINUOUS, Ehsan Dailey MD, Last Rate: 75 mL/hr at 08/31/21 2358, 75 mL/hr at 08/31/21 2358    acetaminophen (TYLENOL) tablet 650 mg, 650 mg, Oral, Q6H PRN **OR** acetaminophen (TYLENOL) suppository 650 mg, 650 mg, Rectal, Q6H PRN, Arvind Dailey MD    polyethylene glycol (MIRALAX) packet 17 g, 17 g, Oral, DAILY PRN, Arvind Dailey MD    ondansetron (ZOFRAN ODT) tablet 4 mg, 4 mg, Oral, Q8H PRN **OR** ondansetron (ZOFRAN) injection 4 mg, 4 mg, IntraVENous, Q6H PRN, Ehsan Dailey MD    LAB DATA REVIEWED:    Recent Results (from the past 24 hour(s))   TYPE & SCREEN    Collection Time: 08/31/21 10:46 AM   Result Value Ref Range    Crossmatch Expiration 09/03/2021,2359     ABO/Rh(D) AB Positive     Antibody screen Negative    COVID-19 RAPID TEST    Collection Time: 08/31/21 11:26 AM   Result Value Ref Range    Specimen source Please find results under separate order      COVID-19 rapid test Not Detected Not Detected     GLUCOSE, POC    Collection Time: 09/01/21 12:05 AM   Result Value Ref Range    Glucose (POC) 92 65 - 117 mg/dL    Performed by Alona Boast THERESA        XR Results (most recent):  Results from Hospital Encounter encounter on 04/19/21    XR CHEST SNGL V    Narrative  Multiple seizures. Comparison chest x-ray 4/24/2020. FINDINGS: Frontal single view of the chest. Normal heart size. Calcified aorta. No vascular congestion or pulmonary edema. The lungs are similarly inflated. Left lower lung scarring. No infiltrate, effusion, pneumothorax. No free air  under diaphragm.  Bony structures intact. Impression  No acute findings. CT ABD PELV W CONT   Final Result   No evidence to explain rectal bleeding and no active findings           Review of Systems:  Constitutional: Negative for chills and fever. HENT: Negative. Eyes: Negative. Respiratory: Negative. Cardiovascular: Negative. Gastrointestinal: No abdominal pain. Bloody stools. Skin: Negative. Neurological: Negative. Objective:   VITALS:    Visit Vitals  /65 (BP 1 Location: Right upper arm, BP Patient Position: At rest)   Pulse 75   Temp 97.8 °F (36.6 °C)   Resp 18   SpO2 94%       Physical Exam:   Constitutional: pt is oriented to person, place, and time. Head: Normocephalic and atraumatic. Eyes: Pupils are equal, round, and reactive to light. EOM are normal.   Cardiovascular: Normal rate, regular rhythm and normal heart sounds. Pulmonary/Chest: Breath sounds normal. No wheezes. No rales. Exhibits no tenderness. Abdominal: Soft. Bowel sounds are normal. There is mild abdominal tenderness diffusely across lower abdomen. There is no rebound and no guarding. PEG tube in place, clean, dry, mild amount of aguilar bleeding per rectum, external hemorrhoids noted  Genitourinary: + hutchins  Musculoskeletal: Normal range of motion. Neurological: pt is alert and oriented to person, place, and time. Alert.  No focal neurologic deficit      ASSESSMENT :    Lower GI bleed  CT a/p no acute findings   Fecal occult test positive  Consult GI  Monitor H&H    External Hemorrhoid            Diverticulosis          CT a/p showed mild sigmoid diverticulosis without wall thickening or fat stranding    Weakness    Hx HTN    Hx Seizure disorder  CXR negative    Hx PEG tube    Hx dementia    Hx CVA     Hx DVT on eliquis             Hx CKD and anemia of chronic disease 2/2 CKD           Cr           BUN           H&H 13.6/41.2       PLAN:  Start NS IVF  Continue Keppra 1,000 mg PO BID  Hold  Xarelto 20 mg PO every day  Continue Amlodipine, 10 mg, PO every day  Continue DDAVP, 0.2 mg PO TID  Continue Ca carbonate, 2 tabs, PO Q6H prn indigestion  Continue ferrous sulfate 300 mg/5 mL syrup PO TID  Continue lamotrigine, 100 mg, PO BID  Start protonix 40 mg PO QD  NPO    Consult GI  Monitor H&H  ________________________________________________________________________    Signed: Fercho Briceno MD

## 2021-09-01 NOTE — PROGRESS NOTES
Bedside shift change report given to NIKOLAY Zhang (oncoming nurse) by Kristine Reese (offgoing nurse). Report included the following information SBAR.

## 2021-09-02 VITALS
RESPIRATION RATE: 18 BRPM | TEMPERATURE: 98 F | SYSTOLIC BLOOD PRESSURE: 119 MMHG | HEART RATE: 72 BPM | OXYGEN SATURATION: 97 % | DIASTOLIC BLOOD PRESSURE: 74 MMHG

## 2021-09-02 LAB
GLUCOSE BLD STRIP.AUTO-MCNC: 129 MG/DL (ref 65–117)
GLUCOSE BLD STRIP.AUTO-MCNC: 89 MG/DL (ref 65–117)
PERFORMED BY, TECHID: ABNORMAL
PERFORMED BY, TECHID: NORMAL

## 2021-09-02 PROCEDURE — 74011250637 HC RX REV CODE- 250/637: Performed by: FAMILY MEDICINE

## 2021-09-02 PROCEDURE — 82962 GLUCOSE BLOOD TEST: CPT

## 2021-09-02 RX ORDER — POTASSIUM CHLORIDE 750 MG/1
40 TABLET, FILM COATED, EXTENDED RELEASE ORAL
Status: DISCONTINUED | OUTPATIENT
Start: 2021-09-02 | End: 2021-09-02 | Stop reason: HOSPADM

## 2021-09-02 RX ORDER — POTASSIUM CHLORIDE 750 MG/1
40 TABLET, FILM COATED, EXTENDED RELEASE ORAL
Status: COMPLETED | OUTPATIENT
Start: 2021-09-02 | End: 2021-09-02

## 2021-09-02 RX ORDER — LEVETIRACETAM 1000 MG/1
1000 TABLET ORAL 2 TIMES DAILY
Qty: 60 TABLET | Refills: 0 | Status: SHIPPED | OUTPATIENT
Start: 2021-09-02

## 2021-09-02 RX ADMIN — DESMOPRESSIN ACETATE 200 MCG: 0.2 TABLET ORAL at 10:31

## 2021-09-02 RX ADMIN — LEVETIRACETAM 1000 MG: 500 TABLET ORAL at 10:33

## 2021-09-02 RX ADMIN — AMLODIPINE BESYLATE 10 MG: 5 TABLET ORAL at 10:32

## 2021-09-02 RX ADMIN — LEVETIRACETAM 250 MG: 250 TABLET, FILM COATED ORAL at 10:37

## 2021-09-02 RX ADMIN — LAMOTRIGINE 100 MG: 100 TABLET ORAL at 10:31

## 2021-09-02 RX ADMIN — POTASSIUM CHLORIDE 40 MEQ: 750 TABLET, FILM COATED, EXTENDED RELEASE ORAL at 10:36

## 2021-09-02 RX ADMIN — MINERAL SUPPLEMENT IRON 300 MG / 5 ML STRENGTH LIQUID 100 PER BOX UNFLAVORED 300 MG: at 10:31

## 2021-09-02 NOTE — DISCHARGE SUMMARY
Discharge Summary       PATIENT ID: Abigail Gerard  MRN: [id number]   YOB: 1950    DATE OF ADMISSION: 8/31/2021  8:48 AM    DATE OF DISCHARGE:   PRIMARY CARE PROVIDER: Simona Hobbs MD     ATTENDING PHYSICIAN: Donavan Dailey  DISCHARGING PROVIDER: Donavan Dailey      CONSULTATIONS: IP CONSULT TO HOSPITALIST  IP CONSULT TO GASTROENTEROLOGY    PROCEDURES/SURGERIES: * No surgery found *    ADMITTING DIAGNOSES:    Patient Active Problem List    Diagnosis Date Noted    Lower GI bleed 08/31/2021    GI bleed 08/31/2021    Seizure ([facility] [address] 08/05/2021    Seizures ([facility] [address] 04/19/2021    Hypernatremia 10/17/2020    UTI (urinary tract infection) 10/17/2020       DISCHARGE DIAGNOSES / PLAN:      Lower GI bleed     External Hemorrhoid             Diverticulosis     Generalized weakness     Hx HTN     Hx Seizure disorder  CXR negative     Hx PEG tube     Hx dementia     Hx CVA      Hx DVT on eliquis                       Hx CKD and anemia of chronic disease 2/2 CKD        DISCHARGE MEDICATIONS:  Current Discharge Medication List      CONTINUE these medications which have CHANGED    Details   levETIRAcetam 1,000 mg tablet Take 1 Tablet by mouth two (2) times a day. Qty: 60 Tablet, Refills: 0  Start date: 9/2/2021         CONTINUE these medications which have NOT CHANGED    Details   Vitamin D2 1,250 mcg (50,000 unit) capsule Take 1 Capsule by mouth every seven (7) days. Fridays      nystatin (MYCOSTATIN) topical cream Apply  to affected area two (2) times a day. Apply twice daily under breasts      amLODIPine (NORVASC) 10 mg tablet Take 1 Tab by mouth daily. Qty: 30 Tab, Refills: 0      desmopressin (DDAVP) 0.2 mg tablet Take 0.2 mg by mouth three (3) times daily. calcium carbonate (OS-GIN) 500 mg calcium (1,250 mg) tablet Take 2 Tabs by mouth every six (6) hours as needed for Indigestion. ferrous sulfate 300 mg (60 mg iron)/5 mL syrup Take 1 tsp by mouth three (3) times daily.       lamoTRIgine (LaMICtal) 100 mg tablet Take 100 mg by mouth two (2) times a day. Indications: convulsive seizures         STOP taking these medications       levETIRAcetam (KEPPRA) 100 mg/mL solution Comments:   Reason for Stopping:         docusate sodium (COLACE) 100 mg capsule Comments:   Reason for Stopping:         rivaroxaban (Xarelto) 20 mg tab tablet Comments:   Reason for Stopping:                 NOTIFY YOUR PHYSICIAN FOR ANY OF THE FOLLOWING:   Fever over 101 degrees for 24 hours. Chest pain, shortness of breath, fever, chills, nausea, vomiting, diarrhea, change in mentation, falling, weakness, bleeding. Severe pain or pain not relieved by medications. Or, any other signs or symptoms that you may have questions about. DISPOSITION:  x  Home With:   OT  PT  HH  RN       Long term SNF/Inpatient Rehab    Independent/assisted living    Hospice    Other:       PATIENT CONDITION AT DISCHARGE: Stable      PHYSICAL EXAMINATION AT DISCHARGE:  General:          Alert, cooperative, no distress, appears stated age. HEENT:           Atraumatic, anicteric sclerae, pink conjunctivae                          No oral ulcers, mucosa moist, throat clear, dentition fair  Neck:               Supple, symmetrical  Lungs:             Clear to auscultation bilaterally. No Wheezing or Rhonchi. No rales. Chest wall:      No tenderness  No Accessory muscle use. Heart:              Regular  rhythm,  No  murmur   No edema  Abdomen:        Soft, non-tender. Not distended. Bowel sounds normal  Extremities:     No cyanosis. No clubbing,                            Skin turgor normal, Capillary refill normal  Skin:                Not pale. Not Jaundiced  No rashes   Psych:             Not anxious or agitated.   Neurologic:      Alert, moves all extremities, answers questions appropriately and responds to commands     CT ABD PELV W CONT   Final Result   No evidence to explain rectal bleeding and no active findings           Recent Results (from the past 24 hour(s))   GLUCOSE, POC    Collection Time: 09/01/21  9:29 AM   Result Value Ref Range    Glucose (POC) 83 65 - 117 mg/dL    Performed by Lewis Carmen    CBC WITH AUTOMATED DIFF    Collection Time: 09/01/21 10:30 AM   Result Value Ref Range    WBC 4.1 3.6 - 11.0 K/uL    RBC 4.08 3.80 - 5.20 M/uL    HGB 12.7 11.5 - 16.0 g/dL    HCT 39.5 35.0 - 47.0 %    MCV 96.8 80.0 - 99.0 FL    MCH 31.1 26.0 - 34.0 PG    MCHC 32.2 30.0 - 36.5 g/dL    RDW 15.4 (H) 11.5 - 14.5 %    PLATELET 507 438 - 807 K/uL    MPV 9.8 8.9 - 12.9 FL    NRBC 0.0 0.0  WBC    ABSOLUTE NRBC 0.00 0.00 - 0.01 K/uL    NEUTROPHILS 42 32 - 75 %    LYMPHOCYTES 43 12 - 49 %    MONOCYTES 12 5 - 13 %    EOSINOPHILS 2 0 - 7 %    BASOPHILS 1 0 - 1 %    IMMATURE GRANULOCYTES 0 0 - 0.5 %    ABS. NEUTROPHILS 1.7 (L) 1.8 - 8.0 K/UL    ABS. LYMPHOCYTES 1.8 0.8 - 3.5 K/UL    ABS. MONOCYTES 0.5 0.0 - 1.0 K/UL    ABS. EOSINOPHILS 0.1 0.0 - 0.4 K/UL    ABS. BASOPHILS 0.0 0.0 - 0.1 K/UL    ABS. IMM. GRANS. 0.0 0.00 - 0.04 K/UL    DF AUTOMATED     METABOLIC PANEL, COMPREHENSIVE    Collection Time: 09/01/21 10:30 AM   Result Value Ref Range    Sodium 143 136 - 145 mmol/L    Potassium 3.2 (L) 3.5 - 5.1 mmol/L    Chloride 110 (H) 97 - 108 mmol/L    CO2 27 21 - 32 mmol/L    Anion gap 6 5 - 15 mmol/L    Glucose 89 65 - 100 mg/dL    BUN 10 6 - 20 mg/dL    Creatinine 1.02 0.55 - 1.02 mg/dL    BUN/Creatinine ratio 10 (L) 12 - 20      GFR est AA >60 >60 ml/min/1.73m2    GFR est non-AA 53 (L) >60 ml/min/1.73m2    Calcium 8.8 8.5 - 10.1 mg/dL    Bilirubin, total 0.4 0.2 - 1.0 mg/dL    AST (SGOT) 8 (L) 15 - 37 U/L    ALT (SGPT) 12 12 - 78 U/L    Alk.  phosphatase 128 (H) 45 - 117 U/L    Protein, total 6.7 6.4 - 8.2 g/dL    Albumin 3.1 (L) 3.5 - 5.0 g/dL    Globulin 3.6 2.0 - 4.0 g/dL    A-G Ratio 0.9 (L) 1.1 - 2.2     GLUCOSE, POC    Collection Time: 09/01/21 11:09 AM   Result Value Ref Range    Glucose (POC) 93 65 - 117 mg/dL    Performed by Lewis Carmen GLUCOSE, POC    Collection Time: 09/01/21  3:04 PM   Result Value Ref Range    Glucose (POC) 88 65 - 117 mg/dL    Performed by Sal Maier POC    Collection Time: 09/01/21 10:38 PM   Result Value Ref Range    Glucose (POC) 88 65 - 117 mg/dL    Performed by [facility] COURSE:    History of present illness precipitation physical at the time of admission as the patient was admitted because of GI bleed patient seen by GI bleeding likely bleeding is hemorrhoids plan for banding, and recommend banding as an outpatient    Patient have no further bleeding while in the hospital patient was on Xarelto will place on hold    Cleared by GI patient can be discharged back to nursing home    Patient refusing blood work this morning      Signed:   Eddie Nolasco MD  9/2/2021  8:38 AM

## 2021-09-02 NOTE — PROGRESS NOTES
Progress Note    Patient: Oumar Perales MRN: [id number]  SSN: [id number]    YOB: 1950  Age: 66 y.o. Sex: female      Admit Date: 8/31/2021    LOS: 2 days     Subjective:   GI is following in consultation for rectal bleeding      Patient is awake and alert not in acute distress. Denies abdominal pain, nausea, nor vomiting. Denies further rectal bleeding. Bilateral lower extremities with 2+ pitting edema. PEG in place. She also eats orally with no problems.   -Hgb 12.7, creatinine normal 1.02.     8/31 GI consult note:   Patient was admitted to the hospital from [address] [place] [address] with the c/o rectal bleeding and abdominal pain. She has a past medical history of CVA, dementia, DVT, on Xarelto, PEG tube placement. Staff noticed rectal bleeding that started yesterday. Accompanying symptoms are generalized weakness and fatigue. Recent cancelled colonoscopy for unknown reason. Fecal occult positive. In the ER, hgb appears stable 13.6, creatinine elevated at 1.21.     CT scan of abdomen No evidence to explain rectal bleeding and no active findings. Objective:     Vitals:    09/01/21 0329 09/01/21 1100 09/01/21 2027 09/02/21 0146   BP: 114/65 (!) 141/81 114/68 127/70   Pulse: 75 81 83 76   Resp: 18 18 18 18   Temp: 97.8 °F (36.6 °C) 97.6 °F (36.4 °C) 97.7 °F (36.5 °C) 97.8 °F (36.6 °C)   SpO2: 94% 95% 94% 95%        Intake and Output:  Current Shift: 09/02 0701 - 09/02 1900  In: 120 [P.O.:120]  Out: -   Last three shifts: No intake/output data recorded. Physical Exam:   Skin:  Extremities and face reveal no rashes. No ogden erythema. HEENT: Sclerae anicteric. Extra-occular muscles are intact. No abnormal pigmentation of the lips. The neck is supple. Cardiovascular: Regular rate and rhythm. Respiratory:  Comfortable breathing with no accessory muscle use. GI:  Abdomen nondistended, soft, and nontender. No enlargement of the liver or spleen.  No masses palpable. PEG in place. Rectal:  Deferred  Musculoskeletal: Extremities have good range of motion. Neurological: Pleasantly confused. Patient is alert and oriented. Psychiatric:  Mood appears appropriate with judgement intact. Lymphatic:  No visible adenopathy      Lab/Data Review:  Recent Results (from the past 24 hour(s))   GLUCOSE, POC    Collection Time: 09/01/21  3:04 PM   Result Value Ref Range    Glucose (POC) 88 65 - 117 mg/dL    Performed by Jamir Sofia, POC    Collection Time: 09/01/21 10:38 PM   Result Value Ref Range    Glucose (POC) 88 65 - 117 mg/dL    Performed by [address] [place] [address], POC    Collection Time: 09/02/21  8:30 AM   Result Value Ref Range    Glucose (POC) 89 65 - 117 mg/dL    Performed by Yevgeniy Alvarado               CT ABD PELV W CONT   Final Result   No evidence to explain rectal bleeding and no active findings          Assessment:     Active Problems:    Lower GI bleed (8/31/2021)      GI bleed (8/31/2021)        Plan:   Lower GI bleed - Patient denies any further bleeding. Hgb stable. Denies pain. Patient is eating well. [address] [address] Marquis for discharge per GI. Follow up as outpatient after 2 weeks for possible hemorrhoid banding. Patient discussed with Dr Stewart Hanna and agrees to above impression and plan. Thank you for allowing me to participate in this patients care    Signed By: Mary Menjivar.  ANTHONY Andujar     September 2, 2021

## 2021-09-02 NOTE — PROGRESS NOTES
Medicare pt has received, reviewed, and signed 2nd IM letter informing them of their right to appeal the discharge. Signed copy has been placed on pt bedside chart. Jose Frances, ISABELLE            Accepted at [facility]. Patient to go to Room 204A. RN to call report to [phone]. Jose Frances, ISABELLE              Message sent to admissions coordinator, and informed of discharge. D/C summary and MAR sent via [facility].       Jose Frances, ISABELLE

## 2021-09-02 NOTE — CONSULTS
Nutrition Note    RD f/u for consult for TF recs. Spoke with pt and RN this AM. RN confirmed pt only utilizes PEG for meds. Tolerating Soft + Bite sized /thin diet without difficulty. Ate >75% of B this AM (french toast, fruit, sausage, eggs, juice). Pt reports having good appetite at baseline. NH resident and provided x3 meals daily. Pt is not a detailed historian however denied issues with nutrition. Also reported having a BM this AM.    No current nutrition concerns at this time. Please re-consult as needed. Thank you.     Electronically signed by Marcelle Vogt on 9/2/2021 at 10:47 AM    Contact: [phone] or via [email or web address]

## 2021-09-02 NOTE — DISCHARGE INSTRUCTIONS
Discharge Instructions       PATIENT ID: Anne Garcia  MRN: [id number]   YOB: 1950    DATE OF ADMISSION: 8/31/2021  8:48 AM    DATE OF DISCHARGE: 9/2/2021    PRIMARY CARE PROVIDER: Yamileth Arce MD     ATTENDING PHYSICIAN: Yamileth Arce MD  DISCHARGING PROVIDER: Mary Lou Dee MD    To contact this individual call [phone] and ask the  to page. If unavailable ask to be transferred the Adult Hospitalist Department. DISCHARGE DIAGNOSES rectal bleed    CONSULTATIONS: IP CONSULT TO HOSPITALIST  IP CONSULT TO GASTROENTEROLOGY    PROCEDURES/SURGERIES: * No surgery found *    PENDING TEST RESULTS:   At the time of discharge the following test results are still pending: Labs    FOLLOW UP APPOINTMENTS:   Follow-up Information     Follow up With Specialties Details Why Horace Arriola MD Family Medicine In 1 week  [address]  [phone]             ADDITIONAL CARE RECOMMENDATIONS: Monitor rectal bleed    DIET: Regular Diet      ACTIVITY: Activity as tolerated    Wound care: Wound Care Order: submitted to [facility] Please view [email or web address]    EQUIPMENT needed: None      DISCHARGE MEDICATIONS:   See Medication Reconciliation Form    · It is important that you take the medication exactly as they are prescribed. · Keep your medication in the bottles provided by the pharmacist and keep a list of the medication names, dosages, and times to be taken in your wallet. · Do not take other medications without consulting your doctor. NOTIFY YOUR PHYSICIAN FOR ANY OF THE FOLLOWING:   Fever over 101 degrees for 24 hours. Chest pain, shortness of breath, fever, chills, nausea, vomiting, diarrhea, change in mentation, falling, weakness, bleeding. Severe pain or pain not relieved by medications. Or, any other signs or symptoms that you may have questions about.       DISPOSITION:    Home With:   OT  PT  [facility]  RN SNF/Inpatient Rehab/LTAC    Independent/assisted living    Hospice    Other:         PROBLEM LIST Updated:  Yes  yes       Signed:   Keshia Vega MD  9/2/2021  8:38 AM     Discharge Instructions       PATIENT ID: Alejandro Randolph  MRN: [id number]   YOB: 1950    DATE OF ADMISSION: 8/31/2021  8:48 AM    DATE OF DISCHARGE: 9/2/2021    PRIMARY CARE PROVIDER: Fabiano Crouch MD     ATTENDING PHYSICIAN: Fabiano Crouch MD  DISCHARGING PROVIDER: Keshia Vega MD    To contact this individual call [phone] and ask the  to page. If unavailable ask to be transferred the Adult Hospitalist Department. DISCHARGE DIAGNOSES ***    CONSULTATIONS: IP CONSULT TO HOSPITALIST  IP CONSULT TO GASTROENTEROLOGY    PROCEDURES/SURGERIES: * No surgery found *    PENDING TEST RESULTS:   At the time of discharge the following test results are still pending: ***    FOLLOW UP APPOINTMENTS:   Follow-up Information     Follow up With Specialties Details Why Quang Bailon MD Family Medicine In 1 week  [address]  [phone]             ADDITIONAL CARE RECOMMENDATIONS: ***    DIET: {diet:76531}      ACTIVITY: {discharge activity:09365}    Wound care: {[facility] Wound Care Instructions:05755}    EQUIPMENT needed: ***      DISCHARGE MEDICATIONS:   See Medication Reconciliation Form    · It is important that you take the medication exactly as they are prescribed. · Keep your medication in the bottles provided by the pharmacist and keep a list of the medication names, dosages, and times to be taken in your wallet. · Do not take other medications without consulting your doctor. NOTIFY YOUR PHYSICIAN FOR ANY OF THE FOLLOWING:   Fever over 101 degrees for 24 hours. Chest pain, shortness of breath, fever, chills, nausea, vomiting, diarrhea, change in mentation, falling, weakness, bleeding. Severe pain or pain not relieved by medications.   Or, any other signs or symptoms that you may have questions about.       DISPOSITION:    Home With:   OT  PT  HH  RN       SNF/Inpatient Rehab/LTAC    Independent/assisted living    Hospice    Other:         PROBLEM LIST Updated:  Yes ***       Signed:   Fercho Briceno MD  9/2/2021  8:38 AM

## 2021-09-02 NOTE — PROGRESS NOTES
Discharge plan of care/case management plan validated with provider discharge order. Pt discharged to [facility] nursing [facility]. IV removed. Report called to [facility] and given to NIKOLAY Richardson. Pt to be transported to room 204A. Pt discharge paperwork completed. Discharge summary completed and signed. Pt belongings taken with pt . Pt to leave via [facility]. Spouse and daughter called. No response from either.

## 2021-09-02 NOTE — PROGRESS NOTES
Received call from [facility]. Facility noted that report was not received. Informed that Silvana Payton was given report for pt going to rm 204A . Spoke to Petr Crockett LPN who has received the patient on a different unit. Report regiven to the stated nurse.

## 2021-09-03 NOTE — PROGRESS NOTES
Physician Progress Note      Arturo Pagan  CSN #:                  [id number]  :                       1950  ADMIT DATE:       2021 8:48 AM  DISCH DATE:        2021 3:15 PM  RESPONDING  PROVIDER #:        Merlin Fanny MD Melburn Bales MD          QUERY TEXT:    Dear Dr. Maninder Lunsford,    Patient admitted with GI bleeding, noted to have hemorrhoids and diverticulosis. If possible, please document in progress notes and discharge summary the cause of the GI bleeding: The medical record reflects the following:  Risk Factors: 66year old female, bright red blood per rectum, weakness, fatigue, history of internal hemorrhoids  Clinical Indicators:  H&P - presented from SNF to the ED after 1 day of worsening rectal bleeding. She also endorsed weakness and fatigue. She states she had bright red blood per rectum. ED rectal exam showed blooding and external hemorrhoid and fecal occult card positive. Lower GI bleed  External Hemorrhoids  Diverticulosis  CT a/p showed mild sigmoid diverticulosis without wall thickening or fat stranding   GI consult - Staff noticed rectal bleeding that started yesterday. Accompanying symptoms are generalized weakness and fatigue. Recent cancelled colonoscopy for unknown reason. Fecal occult positive. rectal bleeding could be from internal hemorrhoids. We can plan for hemorrhoidal banding on Friday if still admitted, or as outpatient. Treatment: monitor H&H, GI consult    Please call [phone] with any questions  Options provided:  -- GI bleeding due to diverticulosis  -- GI bleeding due to external hemorrhoids  -- GI bleeding due to internal hemorrhoids  -- Other - I will add my own diagnosis  -- Disagree - Not applicable / Not valid  -- Disagree - Clinically unable to determine / Unknown  -- Refer to Clinical Documentation Reviewer    PROVIDER RESPONSE TEXT:    This patient has GI bleeding due to external hemorrhoids. Query created by:  Katey Franklin Karen 70 on 9/2/2021 1:05 PM      Electronically signed by:  Joseph Rico MD 9/3/2021 11:20 AM

## 2021-09-08 ENCOUNTER — HOSPITAL ENCOUNTER (EMERGENCY)
Age: 71
Discharge: NURSING, REHAB OR LONG-TERM CARE | End: 2021-09-09
Attending: STUDENT IN AN ORGANIZED HEALTH CARE EDUCATION/TRAINING PROGRAM
Payer: MEDICARE

## 2021-09-08 VITALS
OXYGEN SATURATION: 98 % | RESPIRATION RATE: 16 BRPM | SYSTOLIC BLOOD PRESSURE: 130 MMHG | DIASTOLIC BLOOD PRESSURE: 72 MMHG | TEMPERATURE: 97.6 F | BODY MASS INDEX: 25.76 KG/M2 | HEIGHT: 62 IN | HEART RATE: 84 BPM | WEIGHT: 140 LBS

## 2021-09-08 DIAGNOSIS — E87.0 HYPERNATREMIA: ICD-10-CM

## 2021-09-08 DIAGNOSIS — K64.9 HEMORRHOIDS, UNSPECIFIED HEMORRHOID TYPE: ICD-10-CM

## 2021-09-08 DIAGNOSIS — K62.5 RECTAL BLEEDING: Primary | ICD-10-CM

## 2021-09-08 LAB
ABO + RH BLD: NORMAL
ALBUMIN SERPL-MCNC: 3.3 G/DL (ref 3.5–5)
ALBUMIN/GLOB SERPL: 0.9 {RATIO} (ref 1.1–2.2)
ALP SERPL-CCNC: 111 U/L (ref 45–117)
ALT SERPL-CCNC: 13 U/L (ref 12–78)
ANION GAP SERPL CALC-SCNC: 4 MMOL/L (ref 5–15)
AST SERPL W P-5'-P-CCNC: 9 U/L (ref 15–37)
BASOPHILS # BLD: 0.1 K/UL (ref 0–0.1)
BASOPHILS NFR BLD: 1 % (ref 0–1)
BILIRUB SERPL-MCNC: 0.3 MG/DL (ref 0.2–1)
BLOOD GROUP ANTIBODIES SERPL: NEGATIVE
BUN SERPL-MCNC: 19 MG/DL (ref 6–20)
BUN/CREAT SERPL: 13 (ref 12–20)
CA-I BLD-MCNC: 9.1 MG/DL (ref 8.5–10.1)
CHLORIDE SERPL-SCNC: 117 MMOL/L (ref 97–108)
CO2 SERPL-SCNC: 30 MMOL/L (ref 21–32)
COLLECT DATE STL: ABNORMAL
CREAT SERPL-MCNC: 1.42 MG/DL (ref 0.55–1.02)
DIFFERENTIAL METHOD BLD: ABNORMAL
EOSINOPHIL # BLD: 0.2 K/UL (ref 0–0.4)
EOSINOPHIL NFR BLD: 3 % (ref 0–7)
ERYTHROCYTE [DISTWIDTH] IN BLOOD BY AUTOMATED COUNT: 15.2 % (ref 11.5–14.5)
GLOBULIN SER CALC-MCNC: 3.6 G/DL (ref 2–4)
GLUCOSE SERPL-MCNC: 101 MG/DL (ref 65–100)
HCT VFR BLD AUTO: 35.7 % (ref 35–47)
HEMOCCULT SP1 STL QL: POSITIVE
HGB BLD-MCNC: 11.2 G/DL (ref 11.5–16)
IMM GRANULOCYTES # BLD AUTO: 0 K/UL (ref 0–0.04)
IMM GRANULOCYTES NFR BLD AUTO: 0 % (ref 0–0.5)
INR PPP: 1.1 (ref 0.9–1.1)
LYMPHOCYTES # BLD: 2.3 K/UL (ref 0.8–3.5)
LYMPHOCYTES NFR BLD: 41 % (ref 12–49)
MCH RBC QN AUTO: 31.5 PG (ref 26–34)
MCHC RBC AUTO-ENTMCNC: 31.4 G/DL (ref 30–36.5)
MCV RBC AUTO: 100.3 FL (ref 80–99)
MONOCYTES # BLD: 0.5 K/UL (ref 0–1)
MONOCYTES NFR BLD: 9 % (ref 5–13)
NEUTS SEG # BLD: 2.7 K/UL (ref 1.8–8)
NEUTS SEG NFR BLD: 46 % (ref 32–75)
NRBC # BLD: 0 K/UL (ref 0–0.01)
NRBC BLD-RTO: 0 PER 100 WBC
PLATELET # BLD AUTO: 242 K/UL (ref 150–400)
PMV BLD AUTO: 9.7 FL (ref 8.9–12.9)
POTASSIUM SERPL-SCNC: 3.8 MMOL/L (ref 3.5–5.1)
PROT SERPL-MCNC: 6.9 G/DL (ref 6.4–8.2)
PROTHROMBIN TIME: 14.2 SEC (ref 11.9–14.7)
RBC # BLD AUTO: 3.56 M/UL (ref 3.8–5.2)
SODIUM SERPL-SCNC: 151 MMOL/L (ref 136–145)
SPECIMEN EXP DATE BLD: NORMAL
WBC # BLD AUTO: 5.7 K/UL (ref 3.6–11)

## 2021-09-08 PROCEDURE — 86901 BLOOD TYPING SEROLOGIC RH(D): CPT

## 2021-09-08 PROCEDURE — 80053 COMPREHEN METABOLIC PANEL: CPT

## 2021-09-08 PROCEDURE — 74011000258 HC RX REV CODE- 258: Performed by: STUDENT IN AN ORGANIZED HEALTH CARE EDUCATION/TRAINING PROGRAM

## 2021-09-08 PROCEDURE — 36415 COLL VENOUS BLD VENIPUNCTURE: CPT

## 2021-09-08 PROCEDURE — 99284 EMERGENCY DEPT VISIT MOD MDM: CPT

## 2021-09-08 PROCEDURE — 85610 PROTHROMBIN TIME: CPT

## 2021-09-08 PROCEDURE — 85025 COMPLETE CBC W/AUTO DIFF WBC: CPT

## 2021-09-08 PROCEDURE — 82272 OCCULT BLD FECES 1-3 TESTS: CPT

## 2021-09-08 RX ORDER — DEXTROSE MONOHYDRATE AND SODIUM CHLORIDE 5; .45 G/100ML; G/100ML
100 INJECTION, SOLUTION INTRAVENOUS CONTINUOUS
Status: DISCONTINUED | OUTPATIENT
Start: 2021-09-08 | End: 2021-09-09 | Stop reason: HOSPADM

## 2021-09-08 RX ORDER — DEXTROSE MONOHYDRATE AND SODIUM CHLORIDE 5; .9 G/100ML; G/100ML
100 INJECTION, SOLUTION INTRAVENOUS CONTINUOUS
Status: DISCONTINUED | OUTPATIENT
Start: 2021-09-08 | End: 2021-09-09 | Stop reason: HOSPADM

## 2021-09-08 RX ADMIN — DEXTROSE AND SODIUM CHLORIDE 100 ML/HR: 5; 900 INJECTION, SOLUTION INTRAVENOUS at 18:30

## 2021-09-08 NOTE — ED TRIAGE NOTES
EMS summoned for rectal bleeding which was noticed this AM in the commode by staff at [facility]. Reported pt has G-tube which is tender around site on palpation.

## 2021-09-08 NOTE — ED PROVIDER NOTES
EMERGENCY DEPARTMENT HISTORY AND PHYSICAL EXAM      Date: 9/8/2021  Patient Name: Christiano Yuen    History of Presenting Illness     Chief Complaint   Patient presents with    Rectal Bleeding       History Provided By: Patient    HPI: Christiano Yuen, 66 y.o. female with a past medical history significant Diverticulosis internal and external hemorrhoids, lower GI bleed, atrial fibrillation, seizure disorder presents to the ED with cc of blood in her stool. Patient resident at nursing home, as per patient and nursing home notation noted to have a large bloody bowel movement this morning. Patient denies any abdominal pain no nausea or vomiting. Patient denies any weakness chest pains palpitations. Of note patient was admitted to hospital on August 31, discharged September 2 for similar presentation. Was thought that most likely bleeding from external and internal hemorrhoids were considering possible banding as outpatient. There are no other complaints, changes, or physical findings at this time. PCP: Flower Bosch MD    Current Facility-Administered Medications   Medication Dose Route Frequency Provider Last Rate Last Admin    dextrose 5% and 0.9% NaCl infusion  100 mL/hr IntraVENous CONTINUOUS Linda Lopez MD        dextrose 5 % - 0.45% NaCl infusion  100 mL/hr IntraVENous CONTINUOUS Linda Lopez MD         Current Outpatient Medications   Medication Sig Dispense Refill    levETIRAcetam 1,000 mg tablet Take 1 Tablet by mouth two (2) times a day. 60 Tablet 0    Vitamin D2 1,250 mcg (50,000 unit) capsule Take 1 Capsule by mouth every seven (7) days. Fridays      nystatin (MYCOSTATIN) topical cream Apply  to affected area two (2) times a day. Apply twice daily under breasts      amLODIPine (NORVASC) 10 mg tablet Take 1 Tab by mouth daily. 30 Tab 0    desmopressin (DDAVP) 0.2 mg tablet Take 0.2 mg by mouth three (3) times daily.       calcium carbonate (OS-GIN) 500 mg calcium (1,250 mg) tablet Take 2 Tabs by mouth every six (6) hours as needed for Indigestion.  ferrous sulfate 300 mg (60 mg iron)/5 mL syrup Take 1 tsp by mouth three (3) times daily.  lamoTRIgine (LaMICtal) 100 mg tablet Take 100 mg by mouth two (2) times a day. Indications: convulsive seizures         Past History     Past Medical History:  Past Medical History:   Diagnosis Date    Anemia     Cerebral aneurysm     with repair    Chronic kidney disease     [facility] [address]     GI bleed     Hyperlipidemia     Hypertension     Partial diabetes insipidus ([facility] [address] [facility]     Rectal bleeding     Seizure ([facility] [address] [facility]     Stroke [facility])        Past Surgical History:  Past Surgical History:   Procedure Laterality Date    HX GI  07/08/2021    PEG replacement    HX OTHER SURGICAL      PEG TUBE       Family History:  No family history on file. Social History:  Social History     Tobacco Use    Smoking status: Never Smoker    Smokeless tobacco: Never Used   Substance Use Topics    Alcohol use: Not on file    Drug use: Not on file       Allergies:  No Known Allergies      Review of Systems     Review of Systems   Constitutional: Negative for activity change, chills, fatigue and fever. HENT: Negative for congestion and trouble swallowing. Eyes: Negative for photophobia and visual disturbance. Respiratory: Negative for cough, chest tightness and shortness of breath. Cardiovascular: Negative for chest pain and palpitations. Gastrointestinal: Positive for blood in stool and rectal pain. Negative for abdominal distention, abdominal pain, diarrhea, nausea and vomiting. Genitourinary: Negative for dysuria, flank pain and frequency. Musculoskeletal: Negative for arthralgias, back pain and myalgias. Skin: Negative for color change, pallor and rash. Neurological: Positive for seizures. Negative for dizziness, tremors, weakness and headaches. Psychiatric/Behavioral: Negative for confusion.        Physical Exam     Physical Exam  Vitals and nursing note reviewed. Constitutional:       General: She is not in acute distress. Appearance: Normal appearance. She is normal weight. She is not ill-appearing. HENT:      Head: Normocephalic and atraumatic. Nose: Nose normal.      Mouth/Throat:      Mouth: Mucous membranes are moist.   Eyes:      Extraocular Movements: Extraocular movements intact. Pupils: Pupils are equal, round, and reactive to light. Cardiovascular:      Rate and Rhythm: Normal rate and regular rhythm. Pulses: Normal pulses. Pulmonary:      Effort: Pulmonary effort is normal.   Abdominal:      General: Abdomen is flat. Bowel sounds are normal.      Palpations: Abdomen is soft. Tenderness: There is no abdominal tenderness. There is no guarding. Comments: PEG tube in place, slight erythema around site, no active drainage. Genitourinary:     Comments: External hemorrhoid noted slight tenderness rectal examination no gross blood, guaiac positive    Musculoskeletal:         General: No tenderness. Normal range of motion. Cervical back: Normal range of motion and neck supple. No muscular tenderness. Skin:     General: Skin is warm and dry. Neurological:      General: No focal deficit present. Mental Status: She is alert and oriented to person, place, and time. Sensory: No sensory deficit. Motor: No weakness.          Diagnostic Study Results     Labs -     Recent Results (from the past 12 hour(s))   CBC WITH AUTOMATED DIFF    Collection Time: 09/08/21 12:45 PM   Result Value Ref Range    WBC 5.7 3.6 - 11.0 K/uL    RBC 3.56 (L) 3.80 - 5.20 M/uL    HGB 11.2 (L) 11.5 - 16.0 g/dL    HCT 35.7 35.0 - 47.0 %    .3 (H) 80.0 - 99.0 FL    MCH 31.5 26.0 - 34.0 PG    MCHC 31.4 30.0 - 36.5 g/dL    RDW 15.2 (H) 11.5 - 14.5 %    PLATELET 241 321 - 834 K/uL    MPV 9.7 8.9 - 12.9 FL    NRBC 0.0 0.0  WBC    ABSOLUTE NRBC 0.00 0.00 - 0.01 K/uL    NEUTROPHILS 46 32 - 75 % LYMPHOCYTES 41 12 - 49 %    MONOCYTES 9 5 - 13 %    EOSINOPHILS 3 0 - 7 %    BASOPHILS 1 0 - 1 %    IMMATURE GRANULOCYTES 0 0 - 0.5 %    ABS. NEUTROPHILS 2.7 1.8 - 8.0 K/UL    ABS. LYMPHOCYTES 2.3 0.8 - 3.5 K/UL    ABS. MONOCYTES 0.5 0.0 - 1.0 K/UL    ABS. EOSINOPHILS 0.2 0.0 - 0.4 K/UL    ABS. BASOPHILS 0.1 0.0 - 0.1 K/UL    ABS. IMM. GRANS. 0.0 0.00 - 0.04 K/UL    DF AUTOMATED     PROTHROMBIN TIME + INR    Collection Time: 09/08/21 12:45 PM   Result Value Ref Range    Prothrombin time 14.2 11.9 - 14.7 sec    INR 1.1 0.9 - 1.1     METABOLIC PANEL, COMPREHENSIVE    Collection Time: 09/08/21 12:45 PM   Result Value Ref Range    Sodium 151 (H) 136 - 145 mmol/L    Potassium 3.8 3.5 - 5.1 mmol/L    Chloride 117 (H) 97 - 108 mmol/L    CO2 30 21 - 32 mmol/L    Anion gap 4 (L) 5 - 15 mmol/L    Glucose 101 (H) 65 - 100 mg/dL    BUN 19 6 - 20 mg/dL    Creatinine 1.42 (H) 0.55 - 1.02 mg/dL    BUN/Creatinine ratio 13 12 - 20      GFR est AA 44 (L) >60 ml/min/1.73m2    GFR est non-AA 36 (L) >60 ml/min/1.73m2    Calcium 9.1 8.5 - 10.1 mg/dL    Bilirubin, total 0.3 0.2 - 1.0 mg/dL    AST (SGOT) 9 (L) 15 - 37 U/L    ALT (SGPT) 13 12 - 78 U/L    Alk. phosphatase 111 45 - 117 U/L    Protein, total 6.9 6.4 - 8.2 g/dL    Albumin 3.3 (L) 3.5 - 5.0 g/dL    Globulin 3.6 2.0 - 4.0 g/dL    A-G Ratio 0.9 (L) 1.1 - 2.2     OCCULT BLOOD, STOOL    Collection Time: 09/08/21  1:00 PM   Result Value Ref Range    Occult Blood,day 1 Positive (A) Negative      Day 1 date: 4,[phone]         Radiologic Studies -   [unfilled]  CT Results  (Last 48 hours)    None        CXR Results  (Last 48 hours)    None          Medical Decision Making and ED Course   I am the first provider for this patient. I reviewed the vital signs, available nursing notes, past medical history, past surgical history, family history and social history. Vital Signs-Reviewed the patient's vital signs.   Patient Vitals for the past 12 hrs:   Temp Pulse Resp BP SpO2 09/08/21 1124 97.6 °F (36.4 °C) 71 12 120/65 95 %       EKG interpretation: (Preliminary)      Records Reviewed: Nursing Notes, Old Medical Records and Previous Laboratory Studies    The patient presents with rectal bleed with a differential diagnosis of her GI bleed, AVM, diverticulitis, hemorrhoid      Provider Notes (Medical Decision Making):     MDM   67-year-old female, history of stroke, seizure disorder, peg tube, internal/external hemorrhoids with lower GI bleed, presents to emergency department for gross blood in diaper this morning    Physical exam shows well-appearing female, no distress, stable vitals blood pressure 120/65 pulse 71, abdomen soft nontender nondistended no gross blood per rectal exam however guaiac positive. We will draw basic lab work including type and screen,     ED Course:   Initial assessment performed. The patients presenting problems have been discussed, and they are in agreement with the care plan formulated and outlined with them. I have encouraged them to ask questions as they arise throughout their visit. ED Course as of Sep 08 1540   Wed Sep 08, 2021   1436 Discussed case with Dr. Giovanni Curtis, patient has a known history of hemorrhoids, has been seen by surgery in the past, opted for outpatient banding. At this time given stable hemoglobin, feels that patient does not necessarily require admission unless surgery would like to perform banding as an inpatient in the next day. Patient's metabolic panel significant for hypernatremia, dehydration, patient has a history of diabetes insipidus, Dr. Giovanni Curtis recommends discussing with PICC team possible PICC line placement for hydration in nursing home. [PZ]   1 Spoke with Dr. Miesha Terrell, does not necessarily need to bend patient today or tomorrow, recommends follow-up in clinic next week. [PZ]   1502 Midline placed in patient bedside, will discharge patient back to nursing home.     [PZ]      ED Course User Index  [PZ] Tomeka Watters MD         Procedures       Angelica Conte MD  Procedures               Disposition       Discharged    Remove if not discharged  DISCHARGE PLAN:  1. Current Discharge Medication List      CONTINUE these medications which have NOT CHANGED    Details   levETIRAcetam 1,000 mg tablet Take 1 Tablet by mouth two (2) times a day. Qty: 60 Tablet, Refills: 0      Vitamin D2 1,250 mcg (50,000 unit) capsule Take 1 Capsule by mouth every seven (7) days. Fridays      nystatin (MYCOSTATIN) topical cream Apply  to affected area two (2) times a day. Apply twice daily under breasts      amLODIPine (NORVASC) 10 mg tablet Take 1 Tab by mouth daily. Qty: 30 Tab, Refills: 0      desmopressin (DDAVP) 0.2 mg tablet Take 0.2 mg by mouth three (3) times daily. calcium carbonate (OS-GIN) 500 mg calcium (1,250 mg) tablet Take 2 Tabs by mouth every six (6) hours as needed for Indigestion. ferrous sulfate 300 mg (60 mg iron)/5 mL syrup Take 1 tsp by mouth three (3) times daily. lamoTRIgine (LaMICtal) 100 mg tablet Take 100 mg by mouth two (2) times a day. Indications: convulsive seizures           2. Follow-up Information     Follow up With Specialties Details Why Isabel Escalante MD Family Medicine In 1 week  [address] [facility]  [phone]      Ayah Turcios MD Colon and Rectal Surgery, General Surgery In 1 week  [address]  [phone]          3. Return to ED if worse     Diagnosis     Clinical Impression:   1. Rectal bleeding    2. Hemorrhoids, unspecified hemorrhoid type    3. Hypernatremia        Attestations:    Angelica Conte MD    Please note that this dictation was completed with [employer] the [employer] voice recognition software. Quite often unanticipated grammatical, syntax, homophones, and other interpretive errors are inadvertently transcribed by the computer software.   Please disregard these errors. Please excuse any errors that have escaped final proofreading. Thank you.

## 2021-09-08 NOTE — ED NOTES
Rounded on pt at this time, pt assisted to use the bed pan awaiting transportation back to the nursing home. IV fluid in good progress.

## 2021-09-29 ENCOUNTER — OFFICE VISIT (OUTPATIENT)
Dept: SURGERY | Age: 71
End: 2021-09-29
Payer: MEDICARE

## 2021-09-29 VITALS
RESPIRATION RATE: 18 BRPM | OXYGEN SATURATION: 92 % | DIASTOLIC BLOOD PRESSURE: 88 MMHG | WEIGHT: 152.2 LBS | BODY MASS INDEX: 27.84 KG/M2 | SYSTOLIC BLOOD PRESSURE: 106 MMHG | HEART RATE: 77 BPM | TEMPERATURE: 97.5 F

## 2021-09-29 DIAGNOSIS — K62.5 RECTAL BLEEDING: Primary | ICD-10-CM

## 2021-09-29 DIAGNOSIS — K92.2 LOWER GI BLEED: ICD-10-CM

## 2021-09-29 PROCEDURE — 99204 OFFICE O/P NEW MOD 45 MIN: CPT | Performed by: COLON & RECTAL SURGERY

## 2021-09-29 PROCEDURE — 1090F PRES/ABSN URINE INCON ASSESS: CPT | Performed by: COLON & RECTAL SURGERY

## 2021-09-29 PROCEDURE — G8536 NO DOC ELDER MAL SCRN: HCPCS | Performed by: COLON & RECTAL SURGERY

## 2021-09-29 PROCEDURE — 3017F COLORECTAL CA SCREEN DOC REV: CPT | Performed by: COLON & RECTAL SURGERY

## 2021-09-29 PROCEDURE — 1111F DSCHRG MED/CURRENT MED MERGE: CPT | Performed by: COLON & RECTAL SURGERY

## 2021-09-29 PROCEDURE — G8400 PT W/DXA NO RESULTS DOC: HCPCS | Performed by: COLON & RECTAL SURGERY

## 2021-09-29 PROCEDURE — 1101F PT FALLS ASSESS-DOCD LE1/YR: CPT | Performed by: COLON & RECTAL SURGERY

## 2021-09-29 PROCEDURE — G8427 DOCREV CUR MEDS BY ELIG CLIN: HCPCS | Performed by: COLON & RECTAL SURGERY

## 2021-09-29 PROCEDURE — G8510 SCR DEP NEG, NO PLAN REQD: HCPCS | Performed by: COLON & RECTAL SURGERY

## 2021-09-29 PROCEDURE — G8419 CALC BMI OUT NRM PARAM NOF/U: HCPCS | Performed by: COLON & RECTAL SURGERY

## 2021-09-29 RX ORDER — HYDROCORTISONE ACETATE 25 MG/1
25 SUPPOSITORY RECTAL EVERY 12 HOURS
Qty: 28 SUPPOSITORY | Refills: 0 | Status: SHIPPED | OUTPATIENT
Start: 2021-09-29 | End: 2021-10-13

## 2021-09-29 RX ORDER — ASPIRIN 325 MG
50000 TABLET, DELAYED RELEASE (ENTERIC COATED) ORAL
COMMUNITY

## 2021-09-29 RX ORDER — POLYETHYLENE GLYCOL 3350, SODIUM SULFATE ANHYDROUS, SODIUM BICARBONATE, SODIUM CHLORIDE, POTASSIUM CHLORIDE 236; 22.74; 6.74; 5.86; 2.97 G/4L; G/4L; G/4L; G/4L; G/4L
4 POWDER, FOR SOLUTION ORAL ONCE
Qty: 4000 ML | Refills: 0 | Status: SHIPPED | OUTPATIENT
Start: 2021-09-29 | End: 2021-09-29

## 2021-09-29 NOTE — PROGRESS NOTES
Identified pt with two pt identifiers(name and ). Reviewed record in preparation for visit and have obtained necessary documentation. Chief Complaint   Patient presents with    New Patient     hemorrhoid      Vitals:    21 0949   BP: 106/88   Pulse: 77   Resp: 18   Temp: 97.5 °F (36.4 °C)   TempSrc: Temporal   SpO2: 92%   Weight: 152 lb 3.2 oz (69 kg)   PainSc:   6   PainLoc: Foot       Health Maintenance Review: Patient reminded of \"due or due soon\" health maintenance. I have asked the patient to contact his/her primary care provider (PCP) for follow-up on his/her health maintenance. Coordination of Care Questionnaire:  :   1) Have you been to an emergency room, urgent care, or hospitalized since your last visit? If yes, where when, and reason for visit? no       2. Have seen or consulted any other health care provider since your last visit? If yes, where when, and reason for visit? NO      Patient is accompanied by nursing attendant I have received verbal consent from Kelsea Dudley to discuss any/all medical information while they are present in the room. ADL Assessment 2021   Feeding yourself Help Needed   Getting from bed to chair Help Needed   Getting dressed Help Needed   Bathing or showering Help Needed   Walk across the room (includes cane/walker) Help Needed   Using the telphone Help Needed   Taking your medications Help Needed   Preparing meals Help Needed   Managing money (expenses/bills) Help Needed   Moderately strenuous housework (laundry) Help Needed   Shopping for personal items (toiletries/medicines) Help Needed   Shopping for groceries Help Needed   Driving Help Needed   Climbing a flight of stairs Help Needed   Getting to places beyond walking distances Help Needed     Abuse Screening Questionnaire 2021   Do you ever feel afraid of your partner? N   Are you in a relationship with someone who physically or mentally threatens you? N   Is it safe for you to go home?  Darolyn Merlin Who is the primary learner? Patient    What is the preferred language for health care of the primary learner? ENGLISH    How does the primary learner prefer to learn new concepts?  LISTENING    Answered By patient    Relationship to Learner SELF

## 2021-09-29 NOTE — PROGRESS NOTES
OFFICE VISIT NOTE    Bahman Quiñones is a 66 y.o. female who presents to the office today for:    Chief Complaint   Patient presents with    New Patient     hemorrhoid       The patient is a 78-year-old female who is referred for evaluation of rectal bleeding. Although obtaining a history psych difficult given her mental status she does report she is having bleeding with bowel movements. She was recently hospitalized for this at the end of August.  She was seen by GI however it is not. Colonoscopy was performed. She was seen again in the emergency department recently with similar complaints and hemoglobin stable. She is on Eliquis presumably for her CVA. She has a history of CVA, seizure disorder, mild dementia, retention, diabetes insipidus. Past Medical History:   Diagnosis Date    Anemia     Cerebral aneurysm     with repair    Chronic kidney disease     Dementia ([facility] [address]     GI bleed     Hyperlipidemia     Hypertension     Partial diabetes insipidus ([facility] [facility]     Rectal bleeding     Seizure ([facility] [address] [facility]     [facility])        Past Surgical History:   Procedure Laterality Date    HX GI  07/08/2021    PEG replacement    HX OTHER SURGICAL      PEG TUBE       History reviewed. No pertinent family history.     Social History     Socioeconomic History    Marital status:      Spouse name: Not on file    Number of children: Not on file    Years of education: Not on file    Highest education level: Not on file   Occupational History    Not on file   Tobacco Use    Smoking status: Never Smoker    Smokeless tobacco: Never Used   Vaping Use    Vaping Use: Never used   Substance and Sexual Activity    Alcohol use: Not Currently    Drug use: Not Currently    Sexual activity: Not on file   Other Topics Concern    Not on file   Social History Narrative    Not on file     Social Determinants of Health     Financial Resource Strain:     Difficulty of Paying Living Expenses:    Food Insecurity:     Worried About Running Out of Food in the Last Year:     [address] in the Last Year:    Transportation Needs:     Lack of Transportation (Medical):  Lack of Transportation (Non-Medical):    Physical Activity:     Days of Exercise per Week:     Minutes of Exercise per Session:    Stress:     Feeling of Stress :    Social Connections:     Frequency of Communication with Friends and Family:     Frequency of Social Gatherings with Friends and Family:     Attends [employer] Services:     Active Member of Clubs or Organizations:     Attends Club or Organization Meetings:     Marital Status:    Intimate Partner Violence:     Fear of Current or Ex-Partner:     Emotionally Abused:     Physically Abused:     Sexually Abused:        No Known Allergies    Current Outpatient Medications   Medication Sig    cholecalciferol (VITAMIN D3) (50,000 UNITS /1250 MCG) capsule Take 50,000 Units by mouth every seven (7) days.  apixaban (Eliquis) 5 mg tablet Take 5 mg by mouth two (2) times a day.  levETIRAcetam 1,000 mg tablet Take 1 Tablet by mouth two (2) times a day.  nystatin (MYCOSTATIN) topical cream Apply  to affected area two (2) times a day. Apply twice daily under breasts    amLODIPine (NORVASC) 10 mg tablet Take 1 Tab by mouth daily.  desmopressin (DDAVP) 0.2 mg tablet Take 0.2 mg by mouth three (3) times daily.  calcium carbonate (OS-GIN) 500 mg calcium (1,250 mg) tablet Take 2 Tabs by mouth every six (6) hours as needed for Indigestion.  ferrous sulfate 300 mg (60 mg iron)/5 mL syrup Take 1 tsp by mouth three (3) times daily.  lamoTRIgine (LaMICtal) 100 mg tablet Take 100 mg by mouth two (2) times a day. Indications: convulsive seizures    Vitamin D2 1,250 mcg (50,000 unit) capsule Take 1 Capsule by mouth every seven (7) days.  Fridays (Patient not taking: Reported on 9/29/2021)     No current facility-administered medications for this visit. Review of Systems   Constitutional: Positive for malaise/fatigue. HENT: Negative. Eyes: Positive for blurred vision. Respiratory: Negative. Cardiovascular: Positive for leg swelling. Gastrointestinal: Positive for abdominal pain, blood in stool and diarrhea. Genitourinary: Positive for hematuria. Musculoskeletal: Positive for joint pain and myalgias. Skin: Negative. Neurological: Positive for seizures and headaches. Psychiatric/Behavioral: Positive for memory loss. The patient is nervous/anxious and has insomnia. /88 (BP 1 Location: Left upper arm, BP Patient Position: Sitting, BP Cuff Size: Adult)   Pulse 77   Temp 97.5 °F (36.4 °C) (Temporal)   Resp 18   Wt 152 lb 3.2 oz (69 kg)   SpO2 92%   BMI 27.84 kg/m²     Physical Exam  Constitutional:       Comments: Elderly female frail appearing in no acute distress   HENT:      Head: Normocephalic and atraumatic. Cardiovascular:      Rate and Rhythm: Normal rate. Pulmonary:      Breath sounds: Normal breath sounds. Abdominal:      General: There is no distension. Palpations: Abdomen is soft. Tenderness: There is no abdominal tenderness. Genitourinary:     Comments: Grade 3 internal hemorrhoids  Musculoskeletal:         General: Normal range of motion. Cervical back: Neck supple. Skin:     General: Skin is warm and dry. Neurological:      Mental Status: Mental status is at baseline. Problem List Items Addressed This Visit        Digestive    Lower GI bleed    Rectal bleeding - Primary          Assessment and Plan:  Ms. Gabby Golden is the need to have a colonoscopy to evaluate for her lower GI bleed. She does have grade 3 hemorrhoids which may be responsible however will need to rule out any source higher up in the colon. We will schedule her colonoscopy for October 12, 2021.   She will need to do a bowel prep beforehand. I will send along the instruction sheet from my office. In addition I will will send in a prescription for Anusol HC hydrocortisone suppositories. She is to do 1 suppository twice a day for 14 days.             Clair Dillard MD

## 2021-10-01 ENCOUNTER — APPOINTMENT (OUTPATIENT)
Dept: ENDOSCOPY | Age: 71
End: 2021-10-01
Attending: INTERNAL MEDICINE
Payer: MEDICARE

## 2021-10-01 ENCOUNTER — HOSPITAL ENCOUNTER (OUTPATIENT)
Age: 71
Setting detail: OUTPATIENT SURGERY
Discharge: NURSING, REHAB OR LONG-TERM CARE | End: 2021-10-01
Attending: INTERNAL MEDICINE | Admitting: INTERNAL MEDICINE
Payer: MEDICARE

## 2021-10-01 ENCOUNTER — ANESTHESIA EVENT (OUTPATIENT)
Dept: ENDOSCOPY | Age: 71
End: 2021-10-01
Payer: MEDICARE

## 2021-10-01 ENCOUNTER — ANESTHESIA (OUTPATIENT)
Dept: ENDOSCOPY | Age: 71
End: 2021-10-01
Payer: MEDICARE

## 2021-10-01 VITALS
HEIGHT: 63 IN | WEIGHT: 154 LBS | BODY MASS INDEX: 27.29 KG/M2 | OXYGEN SATURATION: 97 % | DIASTOLIC BLOOD PRESSURE: 77 MMHG | SYSTOLIC BLOOD PRESSURE: 140 MMHG | TEMPERATURE: 97.8 F | RESPIRATION RATE: 18 BRPM | HEART RATE: 70 BPM

## 2021-10-01 PROCEDURE — 77030020018 HC MRKR ENDOSC SPOT 5ML SYR GISP -B: Performed by: INTERNAL MEDICINE

## 2021-10-01 PROCEDURE — 77030041709 HC NDL SCLER BSC -C: Performed by: INTERNAL MEDICINE

## 2021-10-01 PROCEDURE — 74011250636 HC RX REV CODE- 250/636: Performed by: ANESTHESIOLOGY

## 2021-10-01 PROCEDURE — 76040000007: Performed by: INTERNAL MEDICINE

## 2021-10-01 PROCEDURE — 2709999900 HC NON-CHARGEABLE SUPPLY: Performed by: INTERNAL MEDICINE

## 2021-10-01 PROCEDURE — 76060000032 HC ANESTHESIA 0.5 TO 1 HR: Performed by: INTERNAL MEDICINE

## 2021-10-01 RX ORDER — SODIUM CHLORIDE, SODIUM LACTATE, POTASSIUM CHLORIDE, CALCIUM CHLORIDE 600; 310; 30; 20 MG/100ML; MG/100ML; MG/100ML; MG/100ML
INJECTION, SOLUTION INTRAVENOUS
Status: DISCONTINUED | OUTPATIENT
Start: 2021-10-01 | End: 2021-10-01 | Stop reason: HOSPADM

## 2021-10-01 RX ORDER — PROPOFOL 10 MG/ML
INJECTION, EMULSION INTRAVENOUS
Status: COMPLETED
Start: 2021-10-01 | End: 2021-10-01

## 2021-10-01 RX ORDER — PROPOFOL 10 MG/ML
INJECTION, EMULSION INTRAVENOUS AS NEEDED
Status: DISCONTINUED | OUTPATIENT
Start: 2021-10-01 | End: 2021-10-01 | Stop reason: HOSPADM

## 2021-10-01 RX ADMIN — PROPOFOL 60 MG: 10 INJECTION, EMULSION INTRAVENOUS at 12:48

## 2021-10-01 RX ADMIN — PROPOFOL 30 MG: 10 INJECTION, EMULSION INTRAVENOUS at 12:51

## 2021-10-01 RX ADMIN — SODIUM CHLORIDE, POTASSIUM CHLORIDE, SODIUM LACTATE AND CALCIUM CHLORIDE: 600; 310; 30; 20 INJECTION, SOLUTION INTRAVENOUS at 12:45

## 2021-10-01 NOTE — PERIOP NOTES
Notified Dr. Luis Melvin that per [address] home, patient's last dose of eliquis 5 mg PO was yesterday on 9-30-21 at 1700. Also notified Dr. Luis Melvin that patient has a history of seizures and CNA that has accompanied patient to [facility] reports that she has seizures a few times a day with last seizure noted about 15 minutes ago that lasted approximately one minute. Patient resting in bed now alert and oriented x 2 with respirations even and unlabored on RA with no signs or symptoms of distress noted. No new orders received.

## 2021-10-01 NOTE — PROGRESS NOTES
Attempted to call report x2 at 1330, and at 1350 to [facility]. Discharge packet from Dr. Albania Perez sent with patient and aid.

## 2021-10-01 NOTE — ANESTHESIA PREPROCEDURE EVALUATION
Relevant Problems   NEUROLOGY   (+) Seizure (HCC)   (+) Seizures (HCC)       Anesthetic History   No history of anesthetic complications            Review of Systems / Medical History  Patient summary reviewed, nursing notes reviewed and pertinent labs reviewed    Pulmonary  Within defined limits                 Neuro/Psych     seizures  CVA  TIA and dementia    Comments: sz today in holding    Has sz daily sometimes    Took this morning Cardiovascular    Hypertension          Hyperlipidemia         GI/Hepatic/Renal         Renal disease: CRI      Comments: Gi bleeding Endo/Other        Anemia     Other Findings          Past Medical History:   Diagnosis Date    Anemia     Cerebral aneurysm     with repair    Chronic kidney disease     [facility] [address]     GI bleed     Hyperlipidemia     Hypertension     Partial diabetes insipidus [facility] [address]     Rectal bleeding     Seizure ([facility] [address]     [facility]roke [facility])        Past Surgical History:   Procedure Laterality Date    HX GI  07/08/2021    PEG replacement    HX OTHER SURGICAL      PEG TUBE       Current Outpatient Medications   Medication Instructions    amLODIPine (NORVASC) 10 mg, Oral, DAILY    apixaban (ELIQUIS) 5 mg, Oral, 2 TIMES DAILY    calcium carbonate (OS-GIN) 500 mg calcium (1,250 mg) tablet 2 Tablets, Oral, EVERY 6 HOURS AS NEEDED    cholecalciferol (VITAMIN D3) 50,000 Units, Oral, EVERY 7 DAYS    desmopressin (DDAVP) 0.2 mg, Oral, 3 TIMES DAILY    ferrous sulfate 300 mg (60 mg iron)/5 mL syrup 1 tsp, Oral, 3 TIMES DAILY    hydrocortisone (ANUSOL-HC) 25 mg, Rectal, EVERY 12 HOURS    lamoTRIgine (LAMICTAL) 100 mg, Oral, 2 TIMES DAILY    levETIRAcetam 1,000 mg, Oral, 2 TIMES DAILY    nystatin (MYCOSTATIN) topical cream Topical, 2 TIMES DAILY, Apply twice daily under breasts    Vitamin D2 1,250 mcg (50,000 unit) capsule 1 Capsule, EVERY 7 DAYS       No current facility-administered medications for this encounter.        Patient Vitals for the past 24 hrs:   Temp Pulse Resp BP SpO2   10/01/21 1050 36.3 °C (97.4 °F) 64 16 (!) 145/83 97 %       Lab Results   Component Value Date/Time    WBC 5.7 09/08/2021 12:45 PM    HGB 11.2 (L) 09/08/2021 12:45 PM    HCT 35.7 09/08/2021 12:45 PM    PLATELET 061 30/84/4487 12:45 PM    .3 (H) 09/08/2021 12:45 PM     Lab Results   Component Value Date/Time    Sodium 151 (H) 09/08/2021 12:45 PM    Potassium 3.8 09/08/2021 12:45 PM    Chloride 117 (H) 09/08/2021 12:45 PM    CO2 30 09/08/2021 12:45 PM    Anion gap 4 (L) 09/08/2021 12:45 PM    Glucose 101 (H) 09/08/2021 12:45 PM    BUN 19 09/08/2021 12:45 PM    Creatinine 1.42 (H) 09/08/2021 12:45 PM    BUN/Creatinine ratio 13 09/08/2021 12:45 PM    GFR est AA 44 (L) 09/08/2021 12:45 PM    GFR est non-AA 36 (L) 09/08/2021 12:45 PM    Calcium 9.1 09/08/2021 12:45 PM     No results found for: APTT, PTP, INR, INREXT  Lab Results   Component Value Date/Time    Glucose 101 (H) 09/08/2021 12:45 PM    Glucose (POC) 129 (H) 09/02/2021 02:49 PM     Physical Exam    Airway  Mallampati: I  TM Distance: 4 - 6 cm  Neck ROM: normal range of motion   Mouth opening: Normal     Cardiovascular    Rhythm: regular  Rate: normal         Dental    Dentition: Full upper dentures     Pulmonary  Breath sounds clear to auscultation               Abdominal  GI exam deferred       Other Findings            Anesthetic Plan    ASA: 3  Anesthesia type: total IV anesthesia and MAC          Induction: Intravenous  Anesthetic plan and risks discussed with: Patient and Family

## 2021-10-01 NOTE — PERIOP NOTES
Notified Dr. Leon Anderson of CNA's, that accompanied patient to [facility] from [facility] report that patient has multiple seizures everyday and she recently had a seizure that lasted approximately one minute. No new orders received.

## 2021-10-07 ENCOUNTER — TELEPHONE (OUTPATIENT)
Dept: SURGERY | Age: 71
End: 2021-10-07

## 2021-10-07 NOTE — TELEPHONE ENCOUNTER
Jf Rodriguez has called  from Endo Department at [address] [place] [address]. She is calling about the patient. Her concern pt have had a colonoscopy done on 10.01.21  by Dr. Bassam Brunner. Now a upcoming procedure is schedule for 10.12.21 with Dr. Phi Kamara.  Give Jf Rodriguez a call back at [phone]

## 2021-10-07 NOTE — TELEPHONE ENCOUNTER
Spoke with Dr. Houser Master was not aware pt had colonoscopy done by Dr. Lolis Pires on 10/01/2021. He stated cancel the colonoscopy but bring her in the office to take care of the other issues she is having. Called [facility] at [address] and spoke to nurse Fani Mari and cancelled colonoscopy-make appt to see Dr. Thomas Berry in the office.  Thank you

## 2023-09-11 NOTE — PROGRESS NOTES
Attempted IV x1 Patient refusing to be stuck arms with mx bruises . Nurse at bedside tried to reassure patient who refuses to be stuck. Tranexamic Acid Counseling:  Patient advised of the small risk of bleeding problems with tranexamic acid. They were also instructed to call if they developed any nausea, vomiting or diarrhea. All of the patient's questions and concerns were addressed. Spironolactone Counseling: Patient advised regarding risks of diarrhea, abdominal pain, hyperkalemia, birth defects (for female patients), liver toxicity and renal toxicity. The patient may need blood work to monitor liver and kidney function and potassium levels while on therapy. The patient verbalized understanding of the proper use and possible adverse effects of spironolactone.  All of the patient's questions and concerns were addressed. Protopic Counseling: Patient may experience a mild burning sensation during topical application. Protopic is not approved in children less than 2 years of age. There have been case reports of hematologic and skin malignancies in patients using topical calcineurin inhibitors although causality is questionable. Hydroquinone Pregnancy And Lactation Text: This medication has not been assigned a Pregnancy Risk Category but animal studies failed to show danger with the topical medication. It is unknown if the medication is excreted in breast milk. Xolair Counseling:  Patient informed of potential adverse effects including but not limited to fever, muscle aches, rash and allergic reactions.  The patient verbalized understanding of the proper use and possible adverse effects of Xolair.  All of the patient's questions and concerns were addressed. Glycopyrrolate Counseling:  I discussed with the patient the risks of glycopyrrolate including but not limited to skin rash, drowsiness, dry mouth, difficulty urinating, and blurred vision. Tazorac Pregnancy And Lactation Text: This medication is not safe during pregnancy. It is unknown if this medication is excreted in breast milk. Odomzo Counseling- I discussed with the patient the risks of Odomzo including but not limited to nausea, vomiting, diarrhea, constipation, weight loss, changes in the sense of taste, decreased appetite, muscle spasms, and hair loss.  The patient verbalized understanding of the proper use and possible adverse effects of Odomzo.  All of the patient's questions and concerns were addressed. Nsaids Pregnancy And Lactation Text: These medications are considered safe up to 30 weeks gestation. It is excreted in breast milk. Propranolol Counseling:  I discussed with the patient the risks of propranolol including but not limited to low heart rate, low blood pressure, low blood sugar, restlessness and increased cold sensitivity. They should call the office if they experience any of these side effects. Erythromycin Pregnancy And Lactation Text: This medication is Pregnancy Category B and is considered safe during pregnancy. It is also excreted in breast milk. Winlevi Counseling:  I discussed with the patient the risks of topical clascoterone including but not limited to erythema, scaling, itching, and stinging. Patient voiced their understanding. Tetracycline Counseling: Patient counseled regarding possible photosensitivity and increased risk for sunburn.  Patient instructed to avoid sunlight, if possible.  When exposed to sunlight, patients should wear protective clothing, sunglasses, and sunscreen.  The patient was instructed to call the office immediately if the following severe adverse effects occur:  hearing changes, easy bruising/bleeding, severe headache, or vision changes.  The patient verbalized understanding of the proper use and possible adverse effects of tetracycline.  All of the patient's questions and concerns were addressed. Patient understands to avoid pregnancy while on therapy due to potential birth defects. Bactrim Pregnancy And Lactation Text: This medication is Pregnancy Category D and is known to cause fetal risk.  It is also excreted in breast milk. Cosentyx Counseling:  I discussed with the patient the risks of Cosentyx including but not limited to worsening of Crohn's disease, immunosuppression, allergic reactions and infections.  The patient understands that monitoring is required including a PPD at baseline and must alert us or the primary physician if symptoms of infection or other concerning signs are noted. Cyclosporine Pregnancy And Lactation Text: This medication is Pregnancy Category C and it isn't know if it is safe during pregnancy. This medication is excreted in breast milk. Albendazole Counseling:  I discussed with the patient the risks of albendazole including but not limited to cytopenia, kidney damage, nausea/vomiting and severe allergy.  The patient understands that this medication is being used in an off-label manner. Olumiant Pregnancy And Lactation Text: Based on animal studies, Olumiant may cause embryo-fetal harm when administered to pregnant women.  The medication should not be used in pregnancy.  Breastfeeding is not recommended during treatment. Azelaic Acid Counseling: Patient counseled that medicine may cause skin irritation and to avoid applying near the eyes.  In the event of skin irritation, the patient was advised to reduce the amount of the drug applied or use it less frequently.   The patient verbalized understanding of the proper use and possible adverse effects of azelaic acid.  All of the patient's questions and concerns were addressed. Ketoconazole Counseling:   Patient counseled regarding improving absorption with orange juice.  Adverse effects include but are not limited to breast enlargement, headache, diarrhea, nausea, upset stomach, liver function test abnormalities, taste disturbance, and stomach pain.  There is a rare possibility of liver failure that can occur when taking ketoconazole. The patient understands that monitoring of LFTs may be required, especially at baseline. The patient verbalized understanding of the proper use and possible adverse effects of ketoconazole.  All of the patient's questions and concerns were addressed. Clofazimine Counseling:  I discussed with the patient the risks of clofazimine including but not limited to skin and eye pigmentation, liver damage, nausea/vomiting, gastrointestinal bleeding and allergy. Ilumya Pregnancy And Lactation Text: The risk during pregnancy and breastfeeding is uncertain with this medication. Acitretin Counseling:  I discussed with the patient the risks of acitretin including but not limited to hair loss, dry lips/skin/eyes, liver damage, hyperlipidemia, depression/suicidal ideation, photosensitivity.  Serious rare side effects can include but are not limited to pancreatitis, pseudotumor cerebri, bony changes, clot formation/stroke/heart attack.  Patient understands that alcohol is contraindicated since it can result in liver toxicity and significantly prolong the elimination of the drug by many years. Minoxidil Counseling: Minoxidil is a topical medication which can increase blood flow where it is applied. It is uncertain how this medication increases hair growth. Side effects are uncommon and include stinging and allergic reactions. Tranexamic Acid Pregnancy And Lactation Text: It is unknown if this medication is safe during pregnancy or breast feeding. 5-Fu Pregnancy And Lactation Text: This medication is Pregnancy Category X and contraindicated in pregnancy and in women who may become pregnant. It is unknown if this medication is excreted in breast milk. Spironolactone Pregnancy And Lactation Text: This medication can cause feminization of the male fetus and should be avoided during pregnancy. The active metabolite is also found in breast milk. Metronidazole Counseling:  I discussed with the patient the risks of metronidazole including but not limited to seizures, nausea/vomiting, a metallic taste in the mouth, nausea/vomiting and severe allergy. Glycopyrrolate Pregnancy And Lactation Text: This medication is Pregnancy Category B and is considered safe during pregnancy. It is unknown if it is excreted breast milk. Xolair Pregnancy And Lactation Text: This medication is Pregnancy Category B and is considered safe during pregnancy. This medication is excreted in breast milk. Skyrizi Counseling: I discussed with the patient the risks of risankizumab-rzaa including but not limited to immunosuppression, and serious infections.  The patient understands that monitoring is required including a PPD at baseline and must alert us or the primary physician if symptoms of infection or other concerning signs are noted. Imiquimod Counseling:  I discussed with the patient the risks of imiquimod including but not limited to erythema, scaling, itching, weeping, crusting, and pain.  Patient understands that the inflammatory response to imiquimod is variable from person to person and was educated regarded proper titration schedule.  If flu-like symptoms develop, patient knows to discontinue the medication and contact us. Protopic Pregnancy And Lactation Text: This medication is Pregnancy Category C. It is unknown if this medication is excreted in breast milk when applied topically. Topical Clindamycin Counseling: Patient counseled that this medication may cause skin irritation or allergic reactions.  In the event of skin irritation, the patient was advised to reduce the amount of the drug applied or use it less frequently.   The patient verbalized understanding of the proper use and possible adverse effects of clindamycin.  All of the patient's questions and concerns were addressed. Cosentyx Pregnancy And Lactation Text: This medication is Pregnancy Category B and is considered safe during pregnancy. It is unknown if this medication is excreted in breast milk. Olanzapine Counseling- I discussed with the patient the common side effects of olanzapine including but are not limited to: lack of energy, dry mouth, increased appetite, sleepiness, tremor, constipation, dizziness, changes in behavior, or restlessness.  Explained that teenagers are more likely to experience headaches, abdominal pain, pain in the arms or legs, tiredness, and sleepiness.  Serious side effects include but are not limited: increased risk of death in elderly patients who are confused, have memory loss, or dementia-related psychosis; hyperglycemia; increased cholesterol and triglycerides; and weight gain. Odomzo Pregnancy And Lactation Text: This medication is Pregnancy Category X and is absolutely contraindicated during pregnancy. It is unknown if it is excreted in breast milk. Ketoconazole Pregnancy And Lactation Text: This medication is Pregnancy Category C and it isn't know if it is safe during pregnancy. It is also excreted in breast milk and breast feeding isn't recommended. Winlevi Pregnancy And Lactation Text: This medication is considered safe during pregnancy and breastfeeding. Infliximab Counseling:  I discussed with the patient the risks of infliximab including but not limited to myelosuppression, immunosuppression, autoimmune hepatitis, demyelinating diseases, lymphoma, and serious infections.  The patient understands that monitoring is required including a PPD at baseline and must alert us or the primary physician if symptoms of infection or other concerning signs are noted. Albendazole Pregnancy And Lactation Text: This medication is Pregnancy Category C and it isn't known if it is safe during pregnancy. It is also excreted in breast milk. Propranolol Pregnancy And Lactation Text: This medication is Pregnancy Category C and it isn't known if it is safe during pregnancy. It is excreted in breast milk. Rinvoq Counseling: I discussed with the patient the risks of Rinvoq therapy including but not limited to upper respiratory tract infections, shingles, cold sores, bronchitis, nausea, cough, fever, acne, and headache. Live vaccines should be avoided.  This medication has been linked to serious infections; higher rate of mortality; malignancy and lymphoproliferative disorders; major adverse cardiovascular events; thrombosis; thrombocytopenia, anemia, and neutropenia; lipid elevations; liver enzyme elevations; and gastrointestinal perforations. Clofazimine Pregnancy And Lactation Text: This medication is Pregnancy Category C and isn't considered safe during pregnancy. It is excreted in breast milk. Tetracycline Pregnancy And Lactation Text: This medication is Pregnancy Category D and not consider safe during pregnancy. It is also excreted in breast milk. Azelaic Acid Pregnancy And Lactation Text: This medication is considered safe during pregnancy and breast feeding. Methotrexate Counseling:  Patient counseled regarding adverse effects of methotrexate including but not limited to nausea, vomiting, abnormalities in liver function tests. Patients may develop mouth sores, rash, diarrhea, and abnormalities in blood counts. The patient understands that monitoring is required including LFT's and blood counts.  There is a rare possibility of scarring of the liver and lung problems that can occur when taking methotrexate. Persistent nausea, loss of appetite, pale stools, dark urine, cough, and shortness of breath should be reported immediately. Patient advised to discontinue methotrexate treatment at least three months before attempting to become pregnant.  I discussed the need for folate supplements while taking methotrexate.  These supplements can decrease side effects during methotrexate treatment. The patient verbalized understanding of the proper use and possible adverse effects of methotrexate.  All of the patient's questions and concerns were addressed. Valtrex Counseling: I discussed with the patient the risks of valacyclovir including but not limited to kidney damage, nausea, vomiting and severe allergy.  The patient understands that if the infection seems to be worsening or is not improving, they are to call. Cephalexin Counseling: I counseled the patient regarding use of cephalexin as an antibiotic for prophylactic and/or therapeutic purposes. Cephalexin (commonly prescribed under brand name Keflex) is a cephalosporin antibiotic which is active against numerous classes of bacteria, including most skin bacteria. Side effects may include nausea, diarrhea, gastrointestinal upset, rash, hives, yeast infections, and in rare cases, hepatitis, kidney disease, seizures, fever, confusion, neurologic symptoms, and others. Patients with severe allergies to penicillin medications are cautioned that there is about a 10% incidence of cross-reactivity with cephalosporins. When possible, patients with penicillin allergies should use alternatives to cephalosporins for antibiotic therapy. Rhofade Counseling: Rhofade is a topical medication which can decrease superficial blood flow where applied. Side effects are uncommon and include stinging, redness and allergic reactions. Acitretin Pregnancy And Lactation Text: This medication is Pregnancy Category X and should not be given to women who are pregnant or may become pregnant in the future. This medication is excreted in breast milk. Drysol Counseling:  I discussed with the patient the risks of drysol/aluminum chloride including but not limited to skin rash, itching, irritation, burning. Hydroxychloroquine Counseling:  I discussed with the patient that a baseline ophthalmologic exam is needed at the start of therapy and every year thereafter while on therapy. A CBC may also be warranted for monitoring.  The side effects of this medication were discussed with the patient, including but not limited to agranulocytosis, aplastic anemia, seizures, rashes, retinopathy, and liver toxicity. Patient instructed to call the office should any adverse effect occur.  The patient verbalized understanding of the proper use and possible adverse effects of Plaquenil.  All the patient's questions and concerns were addressed. Olanzapine Pregnancy And Lactation Text: This medication is pregnancy category C.   There are no adequate and well controlled trials with olanzapine in pregnant females.  Olanzapine should be used during pregnancy only if the potential benefit justifies the potential risk to the fetus.   In a study in lactating healthy women, olanzapine was excreted in breast milk.  It is recommended that women taking olanzapine should not breast feed. Metronidazole Pregnancy And Lactation Text: This medication is Pregnancy Category B and considered safe during pregnancy.  It is also excreted in breast milk. Topical Clindamycin Pregnancy And Lactation Text: This medication is Pregnancy Category B and is considered safe during pregnancy. It is unknown if it is excreted in breast milk. Azathioprine Counseling:  I discussed with the patient the risks of azathioprine including but not limited to myelosuppression, immunosuppression, hepatotoxicity, lymphoma, and infections.  The patient understands that monitoring is required including baseline LFTs, Creatinine, possible TPMP genotyping and weekly CBCs for the first month and then every 2 weeks thereafter.  The patient verbalized understanding of the proper use and possible adverse effects of azathioprine.  All of the patient's questions and concerns were addressed. Rinvoq Pregnancy And Lactation Text: Based on animal studies, Rinvoq may cause embryo-fetal harm when administered to pregnant women.  The medication should not be used in pregnancy.  Breastfeeding is not recommended during treatment and for 6 days after the last dose. Benzoyl Peroxide Counseling: Patient counseled that medicine may cause skin irritation and bleach clothing.  In the event of skin irritation, the patient was advised to reduce the amount of the drug applied or use it less frequently.   The patient verbalized understanding of the proper use and possible adverse effects of benzoyl peroxide.  All of the patient's questions and concerns were addressed. Erivedge Counseling- I discussed with the patient the risks of Erivedge including but not limited to nausea, vomiting, diarrhea, constipation, weight loss, changes in the sense of taste, decreased appetite, muscle spasms, and hair loss.  The patient verbalized understanding of the proper use and possible adverse effects of Erivedge.  All of the patient's questions and concerns were addressed. Terbinafine Counseling: Patient counseling regarding adverse effects of terbinafine including but not limited to headache, diarrhea, rash, upset stomach, liver function test abnormalities, itching, taste/smell disturbance, nausea, abdominal pain, and flatulence.  There is a rare possibility of liver failure that can occur when taking terbinafine.  The patient understands that a baseline LFT and kidney function test may be required. The patient verbalized understanding of the proper use and possible adverse effects of terbinafine.  All of the patient's questions and concerns were addressed. Dupixent Counseling: I discussed with the patient the risks of dupilumab including but not limited to eye infection and irritation, cold sores, injection site reactions, worsening of asthma, allergic reactions and increased risk of parasitic infection.  Live vaccines should be avoided while taking dupilumab. Dupilumab will also interact with certain medications such as warfarin and cyclosporine. The patient understands that monitoring is required and they must alert us or the primary physician if symptoms of infection or other concerning signs are noted. Zyclara Counseling:  I discussed with the patient the risks of imiquimod including but not limited to erythema, scaling, itching, weeping, crusting, and pain.  Patient understands that the inflammatory response to imiquimod is variable from person to person and was educated regarded proper titration schedule.  If flu-like symptoms develop, patient knows to discontinue the medication and contact us. Ivermectin Counseling:  Patient instructed to take medication on an empty stomach with a full glass of water.  Patient informed of potential adverse effects including but not limited to nausea, diarrhea, dizziness, itching, and swelling of the extremities or lymph nodes.  The patient verbalized understanding of the proper use and possible adverse effects of ivermectin.  All of the patient's questions and concerns were addressed. Methotrexate Pregnancy And Lactation Text: This medication is Pregnancy Category X and is known to cause fetal harm. This medication is excreted in breast milk. Dutasteride Male Counseling: Dustasteride Counseling:  I discussed with the patient the risks of use of dutasteride including but not limited to decreased libido, decreased ejaculate volume, and gynecomastia. Women who can become pregnant should not handle medication.  All of the patient's questions and concerns were addressed. Cephalexin Pregnancy And Lactation Text: This medication is Pregnancy Category B and considered safe during pregnancy.  It is also excreted in breast milk but can be used safely for shorter doses. Stelara Counseling:  I discussed with the patient the risks of ustekinumab including but not limited to immunosuppression, malignancy, posterior leukoencephalopathy syndrome, and serious infections.  The patient understands that monitoring is required including a PPD at baseline and must alert us or the primary physician if symptoms of infection or other concerning signs are noted. Colchicine Counseling:  Patient counseled regarding adverse effects including but not limited to stomach upset (nausea, vomiting, stomach pain, or diarrhea).  Patient instructed to limit alcohol consumption while taking this medication.  Colchicine may reduce blood counts especially with prolonged use.  The patient understands that monitoring of kidney function and blood counts may be required, especially at baseline. The patient verbalized understanding of the proper use and possible adverse effects of colchicine.  All of the patient's questions and concerns were addressed. Bexarotene Counseling:  I discussed with the patient the risks of bexarotene including but not limited to hair loss, dry lips/skin/eyes, liver abnormalities, hyperlipidemia, pancreatitis, depression/suicidal ideation, photosensitivity, drug rash/allergic reactions, hypothyroidism, anemia, leukopenia, infection, cataracts, and teratogenicity.  Patient understands that they will need regular blood tests to check lipid profile, liver function tests, white blood cell count, thyroid function tests and pregnancy test if applicable. Mirvaso Counseling: Mirvaso is a topical medication which can decrease superficial blood flow where applied. Side effects are uncommon and include stinging, redness and allergic reactions. Valtrex Pregnancy And Lactation Text: this medication is Pregnancy Category B and is considered safe during pregnancy. This medication is not directly found in breast milk but it's metabolite acyclovir is present. Rhofade Pregnancy And Lactation Text: This medication has not been assigned a Pregnancy Risk Category. It is unknown if the medication is excreted in breast milk. Hydroxychloroquine Pregnancy And Lactation Text: This medication has been shown to cause fetal harm but it isn't assigned a Pregnancy Risk Category. There are small amounts excreted in breast milk. Fluconazole Counseling:  Patient counseled regarding adverse effects of fluconazole including but not limited to headache, diarrhea, nausea, upset stomach, liver function test abnormalities, taste disturbance, and stomach pain.  There is a rare possibility of liver failure that can occur when taking fluconazole.  The patient understands that monitoring of LFTs and kidney function test may be required, especially at baseline. The patient verbalized understanding of the proper use and possible adverse effects of fluconazole.  All of the patient's questions and concerns were addressed. Minocycline Counseling: Patient advised regarding possible photosensitivity and discoloration of the teeth, skin, lips, tongue and gums.  Patient instructed to avoid sunlight, if possible.  When exposed to sunlight, patients should wear protective clothing, sunglasses, and sunscreen.  The patient was instructed to call the office immediately if the following severe adverse effects occur:  hearing changes, easy bruising/bleeding, severe headache, or vision changes.  The patient verbalized understanding of the proper use and possible adverse effects of minocycline.  All of the patient's questions and concerns were addressed. Topical Ketoconazole Counseling: Patient counseled that this medication may cause skin irritation or allergic reactions.  In the event of skin irritation, the patient was advised to reduce the amount of the drug applied or use it less frequently.   The patient verbalized understanding of the proper use and possible adverse effects of ketoconazole.  All of the patient's questions and concerns were addressed. Sotyktu Counseling:  I discussed the most common side effects of Sotyktu including: common cold, sore throat, sinus infections, cold sores, canker sores, folliculitis, and acne.  I also discussed more serious side effects of Sotyktu including but not limited to: serious allergic reactions; increased risk for infections such as TB; cancers such as lymphomas; rhabdomyolysis and elevated CPK; and elevated triglycerides and liver enzymes.  Dupixent Pregnancy And Lactation Text: This medication likely crosses the placenta but the risk for the fetus is uncertain. This medication is excreted in breast milk. Oral Minoxidil Counseling- I discussed with the patient the risks of oral minoxidil including but not limited to shortness of breath, swelling of the feet or ankles, dizziness, lightheadedness, unwanted hair growth and allergic reaction.  The patient verbalized understanding of the proper use and possible adverse effects of oral minoxidil.  All of the patient's questions and concerns were addressed. Azathioprine Pregnancy And Lactation Text: This medication is Pregnancy Category D and isn't considered safe during pregnancy. It is unknown if this medication is excreted in breast milk. Terbinafine Pregnancy And Lactation Text: This medication is Pregnancy Category B and is considered safe during pregnancy. It is also excreted in breast milk and breast feeding isn't recommended. Benzoyl Peroxide Pregnancy And Lactation Text: This medication is Pregnancy Category C. It is unknown if benzoyl peroxide is excreted in breast milk. Zyclara Pregnancy And Lactation Text: This medication is Pregnancy Category C. It is unknown if this medication is excreted in breast milk. Elidel Counseling: Patient may experience a mild burning sensation during topical application. Elidel is not approved in children less than 2 years of age. There have been case reports of hematologic and skin malignancies in patients using topical calcineurin inhibitors although causality is questionable. Rituxan Counseling:  I discussed with the patient the risks of Rituxan infusions. Side effects can include infusion reactions, severe drug rashes including mucocutaneous reactions, reactivation of latent hepatitis and other infections and rarely progressive multifocal leukoencephalopathy.  All of the patient's questions and concerns were addressed. Bexarotene Pregnancy And Lactation Text: This medication is Pregnancy Category X and should not be given to women who are pregnant or may become pregnant. This medication should not be used if you are breast feeding. Prednisone Counseling:  I discussed with the patient the risks of prolonged use of prednisone including but not limited to weight gain, insomnia, osteoporosis, mood changes, diabetes, susceptibility to infection, glaucoma and high blood pressure.  In cases where prednisone use is prolonged, patients should be monitored with blood pressure checks, serum glucose levels and an eye exam.  Additionally, the patient may need to be placed on GI prophylaxis, PCP prophylaxis, and calcium and vitamin D supplementation and/or a bisphosphonate.  The patient verbalized understanding of the proper use and the possible adverse effects of prednisone.  All of the patient's questions and concerns were addressed. Dutasteride Pregnancy And Lactation Text: This medication is absolutely contraindicated in women, especially during pregnancy and breast feeding. Feminization of male fetuses is possible if taking while pregnant. Low Dose Naltrexone Counseling- I discussed with the patient the potential risks and side effects of low dose naltrexone including but not limited to: more vivid dreams, headaches, nausea, vomiting, abdominal pain, fatigue, dizziness, and anxiety. Clindamycin Counseling: I counseled the patient regarding use of clindamycin as an antibiotic for prophylactic and/or therapeutic purposes. Clindamycin is active against numerous classes of bacteria, including skin bacteria. Side effects may include nausea, diarrhea, gastrointestinal upset, rash, hives, yeast infections, and in rare cases, colitis. Solaraze Counseling:  I discussed with the patient the risks of Solaraze including but not limited to erythema, scaling, itching, weeping, crusting, and pain. Quinolones Pregnancy And Lactation Text: This medication is Pregnancy Category C and it isn't know if it is safe during pregnancy. It is also excreted in breast milk. Oral Minoxidil Pregnancy And Lactation Text: This medication should only be used when clearly needed if you are pregnant, attempting to become pregnant or breast feeding. Detail Level: Detailed Use Enhanced Medication Counseling?: No Enbrel Counseling:  I discussed with the patient the risks of etanercept including but not limited to myelosuppression, immunosuppression, autoimmune hepatitis, demyelinating diseases, lymphoma, and infections.  The patient understands that monitoring is required including a PPD at baseline and must alert us or the primary physician if symptoms of infection or other concerning signs are noted. Cellcept Counseling:  I discussed with the patient the risks of mycophenolate mofetil including but not limited to infection/immunosuppression, GI upset, hypokalemia, hypercholesterolemia, bone marrow suppression, lymphoproliferative disorders, malignancy, GI ulceration/bleed/perforation, colitis, interstitial lung disease, kidney failure, progressive multifocal leukoencephalopathy, and birth defects.  The patient understands that monitoring is required including a baseline creatinine and regular CBC testing. In addition, patient must alert us immediately if symptoms of infection or other concerning signs are noted. Isotretinoin Counseling: Patient should get monthly blood tests, not donate blood, not drive at night if vision affected, not share medication, and not undergo elective surgery for 6 months after tx completed. Side effects reviewed, pt to contact office should one occur. Rituxan Pregnancy And Lactation Text: This medication is Pregnancy Category C and it isn't know if it is safe during pregnancy. It is unknown if this medication is excreted in breast milk but similar antibodies are known to be excreted. Sotyktu Pregnancy And Lactation Text: There is insufficient data to evaluate whether or not Sotyktu is safe to use during pregnancy.   It is not known if Sotyktu passes into breast milk and whether or not it is safe to use when breastfeeding.   Carac Counseling:  I discussed with the patient the risks of Carac including but not limited to erythema, scaling, itching, weeping, crusting, and pain. SSKI Counseling:  I discussed with the patient the risks of SSKI including but not limited to thyroid abnormalities, metallic taste, GI upset, fever, headache, acne, arthralgias, paraesthesias, lymphadenopathy, easy bleeding, arrhythmias, and allergic reaction. Finasteride Male Counseling: Finasteride Counseling:  I discussed with the patient the risks of use of finasteride including but not limited to decreased libido, decreased ejaculate volume, gynecomastia, and depression. Women should not handle medication.  All of the patient's questions and concerns were addressed. Clindamycin Pregnancy And Lactation Text: This medication can be used in pregnancy if certain situations. Clindamycin is also present in breast milk. Doxepin Counseling:  Patient advised that the medication is sedating and not to drive a car after taking this medication. Patient informed of potential adverse effects including but not limited to dry mouth, urinary retention, and blurry vision.  The patient verbalized understanding of the proper use and possible adverse effects of doxepin.  All of the patient's questions and concerns were addressed. Opzelura Counseling:  I discussed with the patient the risks of Opzelura including but not limited to nasopharngitis, bronchitis, ear infection, eosinophila, hives, diarrhea, folliculitis, tonsillitis, and rhinorrhea.  Taken orally, this medication has been linked to serious infections; higher rate of mortality; malignancy and lymphoproliferative disorders; major adverse cardiovascular events; thrombosis; thrombocytopenia, anemia, and neutropenia; and lipid elevations. Dapsone Counseling: I discussed with the patient the risks of dapsone including but not limited to hemolytic anemia, agranulocytosis, rashes, methemoglobinemia, kidney failure, peripheral neuropathy, headaches, GI upset, and liver toxicity.  Patients who start dapsone require monitoring including baseline LFTs and weekly CBCs for the first month, then every month thereafter.  The patient verbalized understanding of the proper use and possible adverse effects of dapsone.  All of the patient's questions and concerns were addressed. Taltz Counseling: I discussed with the patient the risks of ixekizumab including but not limited to immunosuppression, serious infections, worsening of inflammatory bowel disease and drug reactions.  The patient understands that monitoring is required including a PPD at baseline and must alert us or the primary physician if symptoms of infection or other concerning signs are noted. Opioid Counseling: I discussed with the patient the potential side effects of opioids including but not limited to addiction, altered mental status, and depression. I stressed avoiding alcohol, benzodiazepines, muscle relaxants and sleep aids unless specifically okayed by a physician. The patient verbalized understanding of the proper use and possible adverse effects of opioids. All of the patient's questions and concerns were addressed. They were instructed to flush the remaining pills down the toilet if they did not need them for pain. Solaraze Pregnancy And Lactation Text: This medication is Pregnancy Category B and is considered safe. There is some data to suggest avoiding during the third trimester. It is unknown if this medication is excreted in breast milk. Adbry Counseling: I discussed with the patient the risks of tralokinumab including but not limited to eye infection and irritation, cold sores, injection site reactions, worsening of asthma, allergic reactions and increased risk of parasitic infection.  Live vaccines should be avoided while taking tralokinumab. The patient understands that monitoring is required and they must alert us or the primary physician if symptoms of infection or other concerning signs are noted. Cimetidine Counseling:  I discussed with the patient the risks of Cimetidine including but not limited to gynecomastia, headache, diarrhea, nausea, drowsiness, arrhythmias, pancreatitis, skin rashes, psychosis, bone marrow suppression and kidney toxicity. Low Dose Naltrexone Pregnancy And Lactation Text: Naltrexone is pregnancy category C.  There have been no adequate and well-controlled studies in pregnant women.  It should be used in pregnancy only if the potential benefit justifies the potential risk to the fetus.   Limited data indicates that naltrexone is minimally excreted into breastmilk. Rifampin Counseling: I discussed with the patient the risks of rifampin including but not limited to liver damage, kidney damage, red-orange body fluids, nausea/vomiting and severe allergy. Otezla Counseling: The side effects of Otezla were discussed with the patient, including but not limited to worsening or new depression, weight loss, diarrhea, nausea, upper respiratory tract infection, and headache. Patient instructed to call the office should any adverse effect occur.  The patient verbalized understanding of the proper use and possible adverse effects of Otezla.  All the patient's questions and concerns were addressed. Topical Sulfur Applications Counseling: Topical Sulfur Counseling: Patient counseled that this medication may cause skin irritation or allergic reactions.  In the event of skin irritation, the patient was advised to reduce the amount of the drug applied or use it less frequently.   The patient verbalized understanding of the proper use and possible adverse effects of topical sulfur application.  All of the patient's questions and concerns were addressed. Griseofulvin Counseling:  I discussed with the patient the risks of griseofulvin including but not limited to photosensitivity, cytopenia, liver damage, nausea/vomiting and severe allergy.  The patient understands that this medication is best absorbed when taken with a fatty meal (e.g., ice cream or french fries). Xeljanz Counseling: I discussed with the patient the risks of Xeljanz therapy including increased risk of infection, liver issues, headache, diarrhea, or cold symptoms. Live vaccines should be avoided. They were instructed to call if they have any problems. Quinolones Counseling:  I discussed with the patient the risks of fluoroquinolones including but not limited to GI upset, allergic reaction, drug rash, diarrhea, dizziness, photosensitivity, yeast infections, liver function test abnormalities, tendonitis/tendon rupture. Dapsone Pregnancy And Lactation Text: This medication is Pregnancy Category C and is not considered safe during pregnancy or breast feeding. Doxycycline Counseling:  Patient counseled regarding possible photosensitivity and increased risk for sunburn.  Patient instructed to avoid sunlight, if possible.  When exposed to sunlight, patients should wear protective clothing, sunglasses, and sunscreen.  The patient was instructed to call the office immediately if the following severe adverse effects occur:  hearing changes, easy bruising/bleeding, severe headache, or vision changes.  The patient verbalized understanding of the proper use and possible adverse effects of doxycycline.  All of the patient's questions and concerns were addressed. Siliq Counseling:  I discussed with the patient the risks of Siliq including but not limited to new or worsening depression, suicidal thoughts and behavior, immunosuppression, malignancy, posterior leukoencephalopathy syndrome, and serious infections.  The patient understands that monitoring is required including a PPD at baseline and must alert us or the primary physician if symptoms of infection or other concerning signs are noted. There is also a special program designed to monitor depression which is required with Siliq. Isotretinoin Pregnancy And Lactation Text: This medication is Pregnancy Category X and is considered extremely dangerous during pregnancy. It is unknown if it is excreted in breast milk. Sski Pregnancy And Lactation Text: This medication is Pregnancy Category D and isn't considered safe during pregnancy. It is excreted in breast milk. Finasteride Pregnancy And Lactation Text: This medication is absolutely contraindicated during pregnancy. It is unknown if it is excreted in breast milk. Doxepin Pregnancy And Lactation Text: This medication is Pregnancy Category C and it isn't known if it is safe during pregnancy. It is also excreted in breast milk and breast feeding isn't recommended. Eucrisa Counseling: Patient may experience a mild burning sensation during topical application. Eucrisa is not approved in children less than 3 months of age. Opzelura Pregnancy And Lactation Text: There is insufficient data to evaluate drug-associated risk for major birth defects, miscarriage, or other adverse maternal or fetal outcomes.  There is a pregnancy registry that monitors pregnancy outcomes in pregnant persons exposed to the medication during pregnancy.  It is unknown if this medication is excreted in breast milk.  Do not breastfeed during treatment and for about 4 weeks after the last dose. Niacinamide Counseling: I recommended taking niacin or niacinamide, also know as vitamin B3, twice daily. Recent evidence suggests that taking vitamin B3 (500 mg twice daily) can reduce the risk of actinic keratoses and non-melanoma skin cancers. Side effects of vitamin B3 include flushing and headache. Opioid Pregnancy And Lactation Text: These medications can lead to premature delivery and should be avoided during pregnancy. These medications are also present in breast milk in small amounts. Topical Sulfur Applications Pregnancy And Lactation Text: This medication is considered safe during pregnancy and breast feeding secondary to limited systemic absorption. Rifampin Pregnancy And Lactation Text: This medication is Pregnancy Category C and it isn't know if it is safe during pregnancy. It is also excreted in breast milk and should not be used if you are breast feeding. Topical Retinoid counseling:  Patient advised to apply a pea-sized amount only at bedtime and wait 30 minutes after washing their face before applying.  If too drying, patient may add a non-comedogenic moisturizer. The patient verbalized understanding of the proper use and possible adverse effects of retinoids.  All of the patient's questions and concerns were addressed. Adbry Pregnancy And Lactation Text: It is unknown if this medication will adversely affect pregnancy or breast feeding. Otezla Pregnancy And Lactation Text: This medication is Pregnancy Category C and it isn't known if it is safe during pregnancy. It is unknown if it is excreted in breast milk. Griseofulvin Pregnancy And Lactation Text: This medication is Pregnancy Category X and is known to cause serious birth defects. It is unknown if this medication is excreted in breast milk but breast feeding should be avoided. Humira Counseling:  I discussed with the patient the risks of adalimumab including but not limited to myelosuppression, immunosuppression, autoimmune hepatitis, demyelinating diseases, lymphoma, and serious infections.  The patient understands that monitoring is required including a PPD at baseline and must alert us or the primary physician if symptoms of infection or other concerning signs are noted. Azithromycin Counseling:  I discussed with the patient the risks of azithromycin including but not limited to GI upset, allergic reaction, drug rash, diarrhea, and yeast infections. Cibinqo Counseling: I discussed with the patient the risks of Cibinqo therapy including but not limited to common cold, nausea, headache, cold sores, increased blood CPK levels, dizziness, UTIs, fatigue, acne, and vomitting. Live vaccines should be avoided.  This medication has been linked to serious infections; higher rate of mortality; malignancy and lymphoproliferative disorders; major adverse cardiovascular events; thrombosis; thrombocytopenia and lymphopenia; lipid elevations; and retinal detachment. Calcipotriene Counseling:  I discussed with the patient the risks of calcipotriene including but not limited to erythema, scaling, itching, and irritation. Cyclophosphamide Counseling:  I discussed with the patient the risks of cyclophosphamide including but not limited to hair loss, hormonal abnormalities, decreased fertility, abdominal pain, diarrhea, nausea and vomiting, bone marrow suppression and infection. The patient understands that monitoring is required while taking this medication. Birth Control Pills Counseling: Birth Control Pill Counseling: I discussed with the patient the potential side effects of OCPs including but not limited to increased risk of stroke, heart attack, thrombophlebitis, deep venous thrombosis, hepatic adenomas, breast changes, GI upset, headaches, and depression.  The patient verbalized understanding of the proper use and possible adverse effects of OCPs. All of the patient's questions and concerns were addressed. Xelmaximilianoz Pregnancy And Lactation Text: This medication is Pregnancy Category D and is not considered safe during pregnancy.  The risk during breast feeding is also uncertain. Tremfya Counseling: I discussed with the patient the risks of guselkumab including but not limited to immunosuppression, serious infections, and drug reactions.  The patient understands that monitoring is required including a PPD at baseline and must alert us or the primary physician if symptoms of infection or other concerning signs are noted. Gabapentin Counseling: I discussed with the patient the risks of gabapentin including but not limited to dizziness, somnolence, fatigue and ataxia. Picato Counseling:  I discussed with the patient the risks of Picato including but not limited to erythema, scaling, itching, weeping, crusting, and pain. Thalidomide Counseling: I discussed with the patient the risks of thalidomide including but not limited to birth defects, anxiety, weakness, chest pain, dizziness, cough and severe allergy. Doxycycline Pregnancy And Lactation Text: This medication is Pregnancy Category D and not consider safe during pregnancy. It is also excreted in breast milk but is considered safe for shorter treatment courses. Hydroxyzine Counseling: Patient advised that the medication is sedating and not to drive a car after taking this medication.  Patient informed of potential adverse effects including but not limited to dry mouth, urinary retention, and blurry vision.  The patient verbalized understanding of the proper use and possible adverse effects of hydroxyzine.  All of the patient's questions and concerns were addressed. Niacinamide Pregnancy And Lactation Text: These medications are considered safe during pregnancy. Libtayo Counseling- I discussed with the patient the risks of Libtayo including but not limited to nausea, vomiting, diarrhea, and bone or muscle pain.  The patient verbalized understanding of the proper use and possible adverse effects of Libtayo.  All of the patient's questions and concerns were addressed. High Dose Vitamin A Counseling: Side effects reviewed, pt to contact office should one occur. Cibinqo Pregnancy And Lactation Text: It is unknown if this medication will adversely affect pregnancy or breast feeding.  You should not take this medication if you are currently pregnant or planning a pregnancy or while breastfeeding. Cimzia Counseling:  I discussed with the patient the risks of Cimzia including but not limited to immunosuppression, allergic reactions and infections.  The patient understands that monitoring is required including a PPD at baseline and must alert us or the primary physician if symptoms of infection or other concerning signs are noted. Wartpeel Counseling:  I discussed with the patient the risks of Wartpeel including but not limited to erythema, scaling, itching, weeping, crusting, and pain. Oxybutynin Counseling:  I discussed with the patient the risks of oxybutynin including but not limited to skin rash, drowsiness, dry mouth, difficulty urinating, and blurred vision. Azithromycin Pregnancy And Lactation Text: This medication is considered safe during pregnancy and is also secreted in breast milk. Sarecycline Counseling: Patient advised regarding possible photosensitivity and discoloration of the teeth, skin, lips, tongue and gums.  Patient instructed to avoid sunlight, if possible.  When exposed to sunlight, patients should wear protective clothing, sunglasses, and sunscreen.  The patient was instructed to call the office immediately if the following severe adverse effects occur:  hearing changes, easy bruising/bleeding, severe headache, or vision changes.  The patient verbalized understanding of the proper use and possible adverse effects of sarecycline.  All of the patient's questions and concerns were addressed. Calcipotriene Pregnancy And Lactation Text: This medication has not been proven safe during pregnancy. It is unknown if this medication is excreted in breast milk. Itraconazole Counseling:  I discussed with the patient the risks of itraconazole including but not limited to liver damage, nausea/vomiting, neuropathy, and severe allergy.  The patient understands that this medication is best absorbed when taken with acidic beverages such as non-diet cola or ginger ale.  The patient understands that monitoring is required including baseline LFTs and repeat LFTs at intervals.  The patient understands that they are to contact us or the primary physician if concerning signs are noted. Arava Counseling:  Patient counseled regarding adverse effects of Arava including but not limited to nausea, vomiting, abnormalities in liver function tests. Patients may develop mouth sores, rash, diarrhea, and abnormalities in blood counts. The patient understands that monitoring is required including LFTs and blood counts.  There is a rare possibility of scarring of the liver and lung problems that can occur when taking methotrexate. Persistent nausea, loss of appetite, pale stools, dark urine, cough, and shortness of breath should be reported immediately. Patient advised to discontinue Arava treatment and consult with a physician prior to attempting conception. The patient will have to undergo a treatment to eliminate Arava from the body prior to conception. Cyclophosphamide Pregnancy And Lactation Text: This medication is Pregnancy Category D and it isn't considered safe during pregnancy. This medication is excreted in breast milk. Hydroquinone Counseling:  Patient advised that medication may result in skin irritation, lightening (hypopigmentation), dryness, and burning.  In the event of skin irritation, the patient was advised to reduce the amount of the drug applied or use it less frequently.  Rarely, spots that are treated with hydroquinone can become darker (pseudoochronosis).  Should this occur, patient instructed to stop medication and call the office. The patient verbalized understanding of the proper use and possible adverse effects of hydroquinone.  All of the patient's questions and concerns were addressed. Simponi Counseling:  I discussed with the patient the risks of golimumab including but not limited to myelosuppression, immunosuppression, autoimmune hepatitis, demyelinating diseases, lymphoma, and serious infections.  The patient understands that monitoring is required including a PPD at baseline and must alert us or the primary physician if symptoms of infection or other concerning signs are noted. Hydroxyzine Pregnancy And Lactation Text: This medication is not safe during pregnancy and should not be taken. It is also excreted in breast milk and breast feeding isn't recommended. Birth Control Pills Pregnancy And Lactation Text: This medication should be avoided if pregnant and for the first 30 days post-partum. Libtayo Pregnancy And Lactation Text: This medication is contraindicated in pregnancy and when breast feeding. High Dose Vitamin A Pregnancy And Lactation Text: High dose vitamin A therapy is contraindicated during pregnancy and breast feeding. Erythromycin Counseling:  I discussed with the patient the risks of erythromycin including but not limited to GI upset, allergic reaction, drug rash, diarrhea, increase in liver enzymes, and yeast infections. Nsaids Counseling: NSAID Counseling: I discussed with the patient that NSAIDs should be taken with food. Prolonged use of NSAIDs can result in the development of stomach ulcers.  Patient advised to stop taking NSAIDs if abdominal pain occurs.  The patient verbalized understanding of the proper use and possible adverse effects of NSAIDs.  All of the patient's questions and concerns were addressed. Cimzia Pregnancy And Lactation Text: This medication crosses the placenta but can be considered safe in certain situations. Cimzia may be excreted in breast milk. Tazorac Counseling:  Patient advised that medication is irritating and drying.  Patient may need to apply sparingly and wash off after an hour before eventually leaving it on overnight.  The patient verbalized understanding of the proper use and possible adverse effects of tazorac.  All of the patient's questions and concerns were addressed. Olumiant Counseling: I discussed with the patient the risks of Olumiant therapy including but not limited to upper respiratory tract infections, shingles, cold sores, and nausea. Live vaccines should be avoided.  This medication has been linked to serious infections; higher rate of mortality; malignancy and lymphoproliferative disorders; major adverse cardiovascular events; thrombosis; gastrointestinal perforations; neutropenia; lymphopenia; anemia; liver enzyme elevations; and lipid elevations. Bactrim Counseling:  I discussed with the patient the risks of sulfa antibiotics including but not limited to GI upset, allergic reaction, drug rash, diarrhea, dizziness, photosensitivity, and yeast infections.  Rarely, more serious reactions can occur including but not limited to aplastic anemia, agranulocytosis, methemoglobinemia, blood dyscrasias, liver or kidney failure, lung infiltrates or desquamative/blistering drug rashes. Ilumya Counseling: I discussed with the patient the risks of tildrakizumab including but not limited to immunosuppression, malignancy, posterior leukoencephalopathy syndrome, and serious infections.  The patient understands that monitoring is required including a PPD at baseline and must alert us or the primary physician if symptoms of infection or other concerning signs are noted. 5-Fu Counseling: 5-Fluorouracil Counseling:  I discussed with the patient the risks of 5-fluorouracil including but not limited to erythema, scaling, itching, weeping, crusting, and pain. Cyclosporine Counseling:  I discussed with the patient the risks of cyclosporine including but not limited to hypertension, gingival hyperplasia,myelosuppression, immunosuppression, liver damage, kidney damage, neurotoxicity, lymphoma, and serious infections. The patient understands that monitoring is required including baseline blood pressure, CBC, CMP, lipid panel and uric acid, and then 1-2 times monthly CMP and blood pressure.

## (undated) DEVICE — MASK ANES INF SZ 2 PREM TAIL VLV INFL PRT UNSCENTED SGL PT

## (undated) DEVICE — Device: Brand: SPOT EX ENDOSCOPIC TATTOO

## (undated) DEVICE — NEEDLE SCLERO 23GA L240CM OD064MM ID032MM CLR INTERJECT